# Patient Record
Sex: MALE | Race: BLACK OR AFRICAN AMERICAN | Employment: OTHER | URBAN - METROPOLITAN AREA
[De-identification: names, ages, dates, MRNs, and addresses within clinical notes are randomized per-mention and may not be internally consistent; named-entity substitution may affect disease eponyms.]

---

## 2017-04-25 ENCOUNTER — OFFICE VISIT (OUTPATIENT)
Dept: INTERNAL MEDICINE | Facility: CLINIC | Age: 57
End: 2017-04-25
Payer: MEDICAID

## 2017-04-25 VITALS
OXYGEN SATURATION: 96 % | RESPIRATION RATE: 16 BRPM | TEMPERATURE: 97.9 F | HEART RATE: 66 BPM | HEIGHT: 63 IN | DIASTOLIC BLOOD PRESSURE: 64 MMHG | WEIGHT: 155.1 LBS | SYSTOLIC BLOOD PRESSURE: 128 MMHG | BODY MASS INDEX: 27.48 KG/M2

## 2017-04-25 DIAGNOSIS — M10.00 IDIOPATHIC GOUT, UNSPECIFIED CHRONICITY, UNSPECIFIED SITE: Primary | ICD-10-CM

## 2017-04-25 PROCEDURE — 99202 OFFICE O/P NEW SF 15 MIN: CPT | Performed by: NURSE PRACTITIONER

## 2017-04-25 RX ORDER — INDOMETHACIN 25 MG/1
25 CAPSULE ORAL
Qty: 42 CAPSULE | Refills: 1 | Status: SHIPPED | OUTPATIENT
Start: 2017-04-25 | End: 2017-05-26

## 2017-04-25 RX ORDER — ATENOLOL 50 MG/1
50 TABLET ORAL DAILY
COMMUNITY
End: 2017-05-26 | Stop reason: DRUGHIGH

## 2017-04-25 NOTE — MR AVS SNAPSHOT
"              After Visit Summary   2017    Luis Monsalve    MRN: [id number]           Patient Information     Date Of Birth          1960        Visit Information        Provider Department      2017 7:20 AM Laura Granados NP [facility]        Today's Diagnoses     Idiopathic gout, unspecified chronicity, unspecified site    -  1      Care Instructions    Hold jayson Granados RUSS          Follow-ups after your visit        Who to contact     If you have questions or need follow up information about today's clinic visit or your schedule please contact [facility] directly at [phone].  Normal or non-critical lab and imaging results will be communicated to you by MyChart, letter or phone within 4 business days after the clinic has received the results. If you do not hear from us within 7 days, please contact the clinic through [employer] or phone. If you have a critical or abnormal lab result, we will notify you by phone as soon as possible.  Submit refill requests through [employer] or call your pharmacy and they will forward the refill request to us. Please allow 3 business days for your refill to be completed.          Additional Information About Your Visit        MyChart Information     [employer] lets you send messages to your doctor, view your test results, renew your prescriptions, schedule appointments and more. To sign up, go to [email or web address] . Click on \"Log in\" on the left side of the screen, which will take you to the Welcome page. Then click on \"Sign up Now\" on the right side of the page.     You will be asked to enter the access code listed below, as well as some personal information. Please follow the directions to create your username and password.     Your access code is: [email or web address]  Expires: 2017  9:27 AM     Your access code will  in 90 days. If you need help or a new code, please call your [place] clinic or " "[phone].        Care EveryWhere ID     This is your Care EveryWhere ID. This could be used by other organizations to access your [place] medical records  [id number]        Your Vitals Were     Pulse Temperature Respirations Height Pulse Oximetry BMI (Body Mass Index)    66 97.9  F (36.6  C) (Oral) 16 5' 3\" (1.6 m) 96% 27.47 kg/m2       Blood Pressure from Last 3 Encounters:   04/25/17 128/64    Weight from Last 3 Encounters:   04/25/17 155 lb 1.6 oz (70.4 kg)              Today, you had the following     No orders found for display         Today's Medication Changes          These changes are accurate as of: 4/25/17  9:27 AM.  If you have any questions, ask your nurse or doctor.               Start taking these medicines.        Dose/Directions    indomethacin 25 MG capsule   Commonly known as:  INDOCIN   Used for:  Idiopathic gout, unspecified chronicity, unspecified site   Started by:  Laura Granados, LISANDRA        Dose:  25 mg   Take 1 capsule (25 mg) by mouth 3 times daily (with meals)   Quantity:  42 capsule   Refills:  1            Where to get your medicines      These medications were sent to [id number] [address] [address]     Phone:  [phone]     indomethacin 25 MG capsule                Primary Care Provider    Physician No Ref-Primary       No address on file        Thank you!     Thank you for choosing [facility]  for your care. Our goal is always to provide you with excellent care. Hearing back from our patients is one way we can continue to improve our services. Please take a few minutes to complete the written survey that you may receive in the mail after your visit with us. Thank you!             Your Updated Medication List - Protect others around you: Learn how to safely use, store and throw away your medicines at www.disposemymeds.org.          This list is accurate as of: 4/25/17  9:27 " AM.  Always use your most recent med list.                   Brand Name Dispense Instructions for use    atenolol 50 MG tablet    TENORMIN     Take 50 mg by mouth daily       COLCRYS PO      Take 0.6 mg by mouth daily       indomethacin 25 MG capsule    INDOCIN    42 capsule    Take 1 capsule (25 mg) by mouth 3 times daily (with meals)       LISINOPRIL PO      Take 5 mg by mouth daily

## 2017-04-25 NOTE — PROGRESS NOTES
SUBJECTIVE:                                                    Luis Monsalve is a 56 year old male who presents to clinic today for the following health issues:      New Patient/Transfer of Care  bilat foot pain, swelling      Duration: 2 months    Description (location/character/radiation): bilat foot pain, swelling, great toe and MTP joints    Intensity:  moderate    Accompanying signs and symptoms: worse in PM    History (similar episodes/previous evaluation): [place] and started colcrys    Precipitating or alleviating factors: None    Therapies tried and outcome: see above         Patient Active Problem List   Diagnosis     HTN (hypertension), benign     Gout     Past Surgical History:   Procedure Laterality Date     HERNIA REPAIR  2010       Social History   Substance Use Topics     Smoking status: Never Smoker     Smokeless tobacco: Not on file     Alcohol use No     History reviewed. No pertinent family history.      Current Outpatient Prescriptions   Medication Sig Dispense Refill     atenolol (TENORMIN) 50 MG tablet Take 50 mg by mouth daily       Colchicine (COLCRYS PO) Take 0.6 mg by mouth daily       LISINOPRIL PO Take 5 mg by mouth daily       indomethacin (INDOCIN) 25 MG capsule Take 1 capsule (25 mg) by mouth 3 times daily (with meals) 42 capsule 1     BP Readings from Last 3 Encounters:   04/25/17 128/64    Wt Readings from Last 3 Encounters:   04/25/17 155 lb 1.6 oz (70.4 kg)                    Reviewed and updated as needed this visit by clinical staff  Tobacco  Allergies  Meds  Med Hx  Surg Hx  Fam Hx  Soc Hx      Reviewed and updated as needed this visit by Provider         ROS:  Constitutional, HEENT, cardiovascular, pulmonary, gi and gu systems are negative, except as otherwise noted.    OBJECTIVE:                                                    /64 (BP Location: Right arm, Patient Position: Chair, Cuff Size: Adult Regular)  Pulse 66  Temp 97.9  F  "(36.6  C) (Oral)  Resp 16  Ht 5' 3\" (1.6 m)  Wt 155 lb 1.6 oz (70.4 kg)  SpO2 96%  BMI 27.47 kg/m2  Body mass index is 27.47 kg/(m^2).  GENERAL: healthy, alert and no distress  NECK: no adenopathy, no asymmetry, masses, or scars and thyroid normal to palpation  RESP: lungs clear to auscultation - no rales, rhonchi or wheezes  CV: regular rate and rhythm, normal S1 S2, no S3 or S4, no murmur, click or rub, no peripheral edema and peripheral pulses strong  ABDOMEN: soft, nontender, no hepatosplenomegaly, no masses and bowel sounds normal  MS: bilat MTP great toe red, tender, 1+ non pitting edema bilat ankles         ASSESSMENT/PLAN:                                                              ICD-10-CM    1. Idiopathic gout, unspecified chronicity, unspecified site M10.00 indomethacin (INDOCIN) 25 MG capsule       Patient Instructions   Hold jayson Granados, LISANDRA  [facility]    "

## 2017-04-25 NOTE — NURSING NOTE
"Chief Complaint   Patient presents with     Establish Care     Musculoskeletal Problem     gout in both feet, not getting any relief from medication       Initial /64 (BP Location: Right arm, Patient Position: Chair, Cuff Size: Adult Regular)  Pulse 66  Temp 97.9  F (36.6  C) (Oral)  Resp 16  Ht 5' 3\" (1.6 m)  Wt 155 lb 1.6 oz (70.4 kg)  SpO2 96%  BMI 27.47 kg/m2 Estimated body mass index is 27.47 kg/(m^2) as calculated from the following:    Height as of this encounter: 5' 3\" (1.6 m).    Weight as of this encounter: 155 lb 1.6 oz (70.4 kg).  Medication Reconciliation: complete    "

## 2017-05-18 ENCOUNTER — OFFICE VISIT (OUTPATIENT)
Dept: INTERNAL MEDICINE | Facility: CLINIC | Age: 57
End: 2017-05-18
Payer: MEDICAID

## 2017-05-18 ENCOUNTER — RADIANT APPOINTMENT (OUTPATIENT)
Dept: GENERAL RADIOLOGY | Facility: CLINIC | Age: 57
End: 2017-05-18
Attending: NURSE PRACTITIONER
Payer: MEDICAID

## 2017-05-18 VITALS
DIASTOLIC BLOOD PRESSURE: 66 MMHG | BODY MASS INDEX: 25.39 KG/M2 | HEIGHT: 66 IN | TEMPERATURE: 97.7 F | OXYGEN SATURATION: 97 % | WEIGHT: 158 LBS | HEART RATE: 66 BPM | SYSTOLIC BLOOD PRESSURE: 124 MMHG

## 2017-05-18 DIAGNOSIS — J20.9 ACUTE BRONCHITIS WITH SYMPTOMS > 10 DAYS: ICD-10-CM

## 2017-05-18 DIAGNOSIS — Z12.5 PROSTATE CANCER SCREENING: ICD-10-CM

## 2017-05-18 DIAGNOSIS — R10.32 LEFT GROIN PAIN: ICD-10-CM

## 2017-05-18 DIAGNOSIS — Z01.89 ROUTINE LAB DRAW: ICD-10-CM

## 2017-05-18 DIAGNOSIS — K40.90 INGUINAL HERNIA, LEFT: ICD-10-CM

## 2017-05-18 DIAGNOSIS — M10.9 GOUT, UNSPECIFIED CAUSE, UNSPECIFIED CHRONICITY, UNSPECIFIED SITE: ICD-10-CM

## 2017-05-18 DIAGNOSIS — M10.9 GOUT, UNSPECIFIED CAUSE, UNSPECIFIED CHRONICITY, UNSPECIFIED SITE: Primary | ICD-10-CM

## 2017-05-18 DIAGNOSIS — J98.01 ACUTE BRONCHOSPASM: ICD-10-CM

## 2017-05-18 DIAGNOSIS — I10 ESSENTIAL HYPERTENSION: ICD-10-CM

## 2017-05-18 DIAGNOSIS — Z13.6 CARDIOVASCULAR SCREENING; LDL GOAL LESS THAN 130: ICD-10-CM

## 2017-05-18 LAB
BASOPHILS # BLD AUTO: 0 10E9/L (ref 0–0.2)
BASOPHILS NFR BLD AUTO: 0.5 %
DIFFERENTIAL METHOD BLD: ABNORMAL
EOSINOPHIL # BLD AUTO: 1.4 10E9/L (ref 0–0.7)
EOSINOPHIL NFR BLD AUTO: 18.2 %
ERYTHROCYTE [DISTWIDTH] IN BLOOD BY AUTOMATED COUNT: 12.9 % (ref 10–15)
HCT VFR BLD AUTO: 39.7 % (ref 40–53)
HGB BLD-MCNC: 12.9 G/DL (ref 13.3–17.7)
LYMPHOCYTES # BLD AUTO: 2.3 10E9/L (ref 0.8–5.3)
LYMPHOCYTES NFR BLD AUTO: 30.4 %
MCH RBC QN AUTO: 31.1 PG (ref 26.5–33)
MCHC RBC AUTO-ENTMCNC: 32.5 G/DL (ref 31.5–36.5)
MCV RBC AUTO: 96 FL (ref 78–100)
MONOCYTES # BLD AUTO: 0.7 10E9/L (ref 0–1.3)
MONOCYTES NFR BLD AUTO: 9.1 %
NEUTROPHILS # BLD AUTO: 3.1 10E9/L (ref 1.6–8.3)
NEUTROPHILS NFR BLD AUTO: 41.8 %
PLATELET # BLD AUTO: 285 10E9/L (ref 150–450)
RBC # BLD AUTO: 4.15 10E12/L (ref 4.4–5.9)
WBC # BLD AUTO: 7.5 10E9/L (ref 4–11)

## 2017-05-18 PROCEDURE — G0103 PSA SCREENING: HCPCS | Performed by: NURSE PRACTITIONER

## 2017-05-18 PROCEDURE — 73630 X-RAY EXAM OF FOOT: CPT | Mod: RT

## 2017-05-18 PROCEDURE — 36415 COLL VENOUS BLD VENIPUNCTURE: CPT | Performed by: NURSE PRACTITIONER

## 2017-05-18 PROCEDURE — 80053 COMPREHEN METABOLIC PANEL: CPT | Performed by: NURSE PRACTITIONER

## 2017-05-18 PROCEDURE — 84550 ASSAY OF BLOOD/URIC ACID: CPT | Performed by: NURSE PRACTITIONER

## 2017-05-18 PROCEDURE — 99214 OFFICE O/P EST MOD 30 MIN: CPT | Performed by: NURSE PRACTITIONER

## 2017-05-18 PROCEDURE — 84443 ASSAY THYROID STIM HORMONE: CPT | Performed by: NURSE PRACTITIONER

## 2017-05-18 PROCEDURE — 82043 UR ALBUMIN QUANTITATIVE: CPT | Performed by: NURSE PRACTITIONER

## 2017-05-18 PROCEDURE — 85025 COMPLETE CBC W/AUTO DIFF WBC: CPT | Performed by: NURSE PRACTITIONER

## 2017-05-18 PROCEDURE — 80061 LIPID PANEL: CPT | Performed by: NURSE PRACTITIONER

## 2017-05-18 RX ORDER — CEFDINIR 300 MG/1
300 CAPSULE ORAL 2 TIMES DAILY
Qty: 20 CAPSULE | Refills: 0 | Status: SHIPPED | OUTPATIENT
Start: 2017-05-18 | End: 2017-07-13

## 2017-05-18 RX ORDER — ATENOLOL 50 MG/1
50 TABLET ORAL DAILY
COMMUNITY
End: 2017-05-23

## 2017-05-18 RX ORDER — ALBUTEROL SULFATE 0.83 MG/ML
1 SOLUTION RESPIRATORY (INHALATION) ONCE
Qty: 3 ML | Refills: 0
Start: 2017-05-18 | End: 2017-05-18

## 2017-05-18 RX ORDER — ALBUTEROL SULFATE 0.83 MG/ML
1 SOLUTION RESPIRATORY (INHALATION) EVERY 6 HOURS PRN
Qty: 50 VIAL | Refills: 1 | Status: SHIPPED | OUTPATIENT
Start: 2017-05-18 | End: 2017-07-18

## 2017-05-18 RX ORDER — PREDNISONE 10 MG/1
TABLET ORAL
Qty: 18 TABLET | Refills: 0 | Status: SHIPPED | OUTPATIENT
Start: 2017-05-18 | End: 2017-05-26

## 2017-05-18 RX ORDER — ALBUTEROL SULFATE 90 UG/1
2 AEROSOL, METERED RESPIRATORY (INHALATION) EVERY 6 HOURS
COMMUNITY
End: 2017-07-18

## 2017-05-18 NOTE — MR AVS SNAPSHOT
After Visit Summary   5/18/2017    Luis Yates    MRN: [id number]           Patient Information     Date Of Birth          1960        Visit Information        Provider Department      5/18/2017 10:45 AM Carola Grullon APRN CNP; [facility]        Today's Diagnoses     Gout, unspecified cause, unspecified chronicity, unspecified site    -  1    Inguinal hernia, left        Left groin pain        Acute bronchospasm        CARDIOVASCULAR SCREENING; LDL GOAL LESS THAN 130        Essential hypertension        Routine lab draw        Acute bronchitis with symptoms > 10 days        Prostate cancer screening          Care Instructions    Labs in [address]    X ray in [address]    Foot doctor   CIPRIANO: [facility]  [facility]  [phone]   [email or web address]    Hernia doctor   [facility] [phone]   [email or web address]    Omnicef twice daily for 10 days      Prednisone- take in morning with food   Take 3 tabs daily for 3 days   Take 2 tabs daily for 3 days   Take 1 tab daily for 3 days   Then stop    Follow up with any questions or concerns          Follow-ups after your visit        Additional Services     GENERAL SURG ADULT REFERRAL       Your provider has referred you to: [facility]AN[facility]: [facility] [phone]   [email or web address]    Please be aware that coverage of these services is subject to the terms and limitations of your health insurance plan.  Call member services at your health plan with any benefit or coverage questions.      Please bring the following with you to your appointment:    (1) Any X-Rays, CTs or MRIs which have been performed.  Contact the facility where they were done to arrange for   prior to your scheduled appointment.   (2) List of current medications   (3) This referral request   (4) Any documents/labs given to you for this referral            PODIATRY/FOOT & ANKLE SURGERY REFERRAL       Your provider has referred you to: CIPRIANO: [facility] [place] [facility]  [facility]  [phone]   [email or web address]/    Please be aware that coverage of these services is subject to the terms and limitations of your health insurance plan.  Call member services at your health plan with any benefit or coverage questions.      Please bring the following to your appointment:  >>   Any x-rays, CTs or MRIs which have been performed.  Contact the facility where they were done to arrange for  prior to your scheduled appointment.    >>   List of current medications   >>   This referral request   >>   Any documents/labs given to you for this referral                  Future tests that were ordered for you today     Open Future Orders        Priority Expected Expires Ordered    XR Foot Bilateral G/E 3 Views Routine 5/18/2017 5/18/2018 5/18/2017            Who to contact     If you have questions or need follow up information about today's clinic visit or your schedule please contact [facility] directly at [phone].  Normal or non-critical lab and imaging results will be communicated to you by MyChart, letter or phone within 4 business days after the clinic has received the results. If you do not hear from us within 7 days, please contact the clinic through MyChart or phone. If you have a critical or abnormal lab result, we will notify you by phone as soon as possible.  Submit refill requests through [employer] or call your pharmacy and they will forward the refill request to us. Please allow 3 business days for your refill to be completed.          Additional Information About Your Visit       "  [email or web address] Information     [employer] lets you send messages to your doctor, view your test results, renew your prescriptions, schedule appointments and more. To sign up, go to [email or web address] . Click on \"Log in\" on the left side of the screen, which will take you to the Welcome page. Then click on \"Sign up Now\" on the right side of the page.     You will be asked to enter the access code listed below, as well as some personal information. Please follow the directions to create your username and password.     Your access code is: [email or web address]  Expires: 2017 12:39 PM     Your access code will  in 90 days. If you need help or a new code, please call your [place] clinic or [phone].        Care EveryWhere ID     This is your Care EveryWhere ID. This could be used by other organizations to access your [place] medical records  [id number]        Your Vitals Were     Pulse Temperature Height Pulse Oximetry BMI (Body Mass Index)       66 97.7  F (36.5  C) (Oral) 5' 6\" (1.676 m) 97% 25.5 kg/m2        Blood Pressure from Last 3 Encounters:   17 124/66    Weight from Last 3 Encounters:   17 158 lb (71.7 kg)              We Performed the Following     Albumin Random Urine Quantitative     CBC with platelets differential     Comprehensive metabolic panel     GENERAL SURG ADULT REFERRAL     Lipid Profile with reflex to direct LDL     PODIATRY/FOOT & ANKLE SURGERY REFERRAL     PSA, screen     TSH with free T4 reflex     Uric acid          Today's Medication Changes          These changes are accurate as of: 17 12:39 PM.  If you have any questions, ask your nurse or doctor.               Start taking these medicines.        Dose/Directions    cefdinir 300 MG capsule   Commonly known as:  OMNICEF   Used for:  Acute bronchospasm, Acute bronchitis with symptoms > 10 days   Started by:  Caroal Grullon APRN CNP        Dose:  300 mg   Take 1 capsule (300 mg) by mouth 2 times daily   Quantity: "  20 capsule   Refills:  0       order for DME   Used for:  Acute bronchospasm, Acute bronchitis with symptoms > 10 days   Started by:  Carola Grullon APRN CNP        Neb machine and tubing   Quantity:  1 Device   Refills:  prn       predniSONE 10 MG tablet   Commonly known as:  DELTASONE   Used for:  Acute bronchospasm, Acute bronchitis with symptoms > 10 days   Started by:  Carola Grullon APRN CNP        Take 3 tabs daily for 3 days Take 2 tabs daily for 3 days Take 1 tab daily for 3 days Then stop   Quantity:  18 tablet   Refills:  0         These medicines have changed or have updated prescriptions.        Dose/Directions    * albuterol 108 (90 BASE) MCG/ACT Inhaler   Commonly known as:  PROAIR HFA/PROVENTIL HFA/VENTOLIN HFA   This may have changed:  Another medication with the same name was added. Make sure you understand how and when to take each.   Changed by:  Carola Grullon APRN CNP        Dose:  2 puff   Inhale 2 puffs into the lungs every 6 hours   Refills:  0       * albuterol (2.5 MG/3ML) 0.083% neb solution   This may have changed:  You were already taking a medication with the same name, and this prescription was added. Make sure you understand how and when to take each.   Used for:  Acute bronchospasm   Changed by:  Carola Grullon APRN CNP        Dose:  1 vial   Take 1 vial (2.5 mg) by nebulization once for 1 dose   Quantity:  3 mL   Refills:  0       * albuterol (2.5 MG/3ML) 0.083% neb solution   This may have changed:  You were already taking a medication with the same name, and this prescription was added. Make sure you understand how and when to take each.   Used for:  Acute bronchospasm, Acute bronchitis with symptoms > 10 days   Changed by:  Carola Grullon APRN CNP        Dose:  1 vial   Take 1 vial (2.5 mg) by nebulization every 6 hours as needed for shortness of breath / dyspnea or wheezing   Quantity:  50 vial   Refills:  1       * Notice:  This list has 3  medication(s) that are the same as other medications prescribed for you. Read the directions carefully, and ask your doctor or other care provider to review them with you.         Where to get your medicines      These medications were sent to [id number] - [place], [place] - [address]  [address], [facility] [address]     Phone:  [phone]     albuterol (2.5 MG/3ML) 0.083% neb solution    cefdinir 300 MG capsule    predniSONE 10 MG tablet         Some of these will need a paper prescription and others can be bought over the counter.  Ask your nurse if you have questions.     Bring a paper prescription for each of these medications     order for DME       You don't need a prescription for these medications     albuterol (2.5 MG/3ML) 0.083% neb solution                Primary Care Provider    None Specified       No primary provider on file.        Thank you!     Thank you for choosing [facility]  for your care. Our goal is always to provide you with excellent care. Hearing back from our patients is one way we can continue to improve our services. Please take a few minutes to complete the written survey that you may receive in the mail after your visit with us. Thank you!             Your Updated Medication List - Protect others around you: Learn how to safely use, store and throw away your medicines at www.disposemymeds.org.          This list is accurate as of: 5/18/17 12:39 PM.  Always use your most recent med list.                   Brand Name Dispense Instructions for use    * albuterol 108 (90 BASE) MCG/ACT Inhaler    PROAIR HFA/PROVENTIL HFA/VENTOLIN HFA     Inhale 2 puffs into the lungs every 6 hours       * albuterol (2.5 MG/3ML) 0.083% neb solution     3 mL    Take 1 vial (2.5 mg) by nebulization once for 1 dose       * albuterol (2.5 MG/3ML) 0.083% neb solution     50 vial    Take 1 vial (2.5 mg) by nebulization every 6 hours as needed for shortness of breath  / dyspnea or wheezing       atenolol 50 MG tablet    TENORMIN     Take 50 mg by mouth daily       cefdinir 300 MG capsule    OMNICEF    20 capsule    Take 1 capsule (300 mg) by mouth 2 times daily       COLCHICINE PO      Take 0.6 mg by mouth daily       INDOMETHACIN PO      Take 25 mg by mouth 3 times daily       LOSARTAN POTASSIUM PO      Take 25 mg by mouth daily       order for DME     1 Device    Neb machine and tubing       predniSONE 10 MG tablet    DELTASONE    18 tablet    Take 3 tabs daily for 3 days Take 2 tabs daily for 3 days Take 1 tab daily for 3 days Then stop       * Notice:  This list has 3 medication(s) that are the same as other medications prescribed for you. Read the directions carefully, and ask your doctor or other care provider to review them with you.

## 2017-05-18 NOTE — NURSING NOTE
"Chief Complaint   Patient presents with     Breathing Problem     onset x 2 weeks       Initial /66 (BP Location: Left arm, Patient Position: Chair, Cuff Size: Adult Large)  Pulse 66  Temp 97.7  F (36.5  C) (Oral)  Ht 5' 6\" (1.676 m)  Wt 158 lb (71.7 kg)  SpO2 97%  BMI 25.5 kg/m2 Estimated body mass index is 25.5 kg/(m^2) as calculated from the following:    Height as of this encounter: 5' 6\" (1.676 m).    Weight as of this encounter: 158 lb (71.7 kg).  Medication Reconciliation: complete    "

## 2017-05-18 NOTE — PROGRESS NOTES
".  SUBJECTIVE:                                                    Luis Yates is a 56 year old male who presents to clinic today for the following health issues:    Chief Complaint   Patient presents with     Breathing Problem     onset x 2 weeks  Noisy at night   Sneeze a lot    Was given albuterol for wheezing about 2 weeks ago   No history asthma   Was given antibiotics also - sounds like Z pack   Chest x ray was ok     Has some pain in left lower abdomen   Burning with urination   Has history of hernia in past that was repaired       Gout - indocin   Blood pressure went high and he had a fever so stopped after 1 dose  Was taking colchicine daily for more than 2 months to keep controlled  He has been off medication for a week     Both shoulder bother him has been present about a month   They feel itchy   Hard to sleep   Pinching feeling in shoulder     wants fasting labs     [place] original home- here with daughter       Problem list and histories reviewed & adjusted, as indicated.  Additional history: as documented    Patient Active Problem List   Diagnosis     HTN (hypertension)     Gout     Past Surgical History:   Procedure Laterality Date     HERNIA REPAIR  2010    left        Social History   Substance Use Topics     Smoking status: Never Smoker     Smokeless tobacco: Not on file     Alcohol use No     No family history on file.        Reviewed and updated as needed this visit by clinical staff  Tobacco  Allergies  Meds  Soc Hx      Reviewed and updated as needed this visit by Provider         ROS:  Constitutional, HEENT, cardiovascular, pulmonary, GI, , musculoskeletal, neuro, skin, endocrine and psych systems are negative, except as otherwise noted.    OBJECTIVE:                                                    /66 (BP Location: Left arm, Patient Position: Chair, Cuff Size: Adult Large)  Pulse 66  Temp 97.7  F (36.5  C) (Oral)  Ht 5' 6\" (1.676 m)  Wt 158 lb (71.7 kg)  SpO2 97%  BMI " 25.5 kg/m2  Body mass index is 25.5 kg/(m^2).  GENERAL: alert and mild distress with foot pain and wheezing ; daughter and  present   RESP: lungs with scattered wheezing throughout   Albuterol neb given with improvement and only occasional wheezes   CV: regular rate and rhythm, normal S1 S2, no S3 or S4, no murmur, click or rub, no peripheral edema   ABDOMEN: soft, LLQ and bulging with hernia , no hepatosplenomegaly, no masses and bowel sounds normal   (male): normal male genitalia without lesions or urethral discharge, left hernia  MS: no gross musculoskeletal defects noted, no edema- painful beside first great toe bilaterally- no excess heat or redness   NEURO: Normal strength and tone, mentation intact and speech normal  PSYCH: mentation appears normal, affect normal/bright    Diagnostic Test Results:  Labs   X ray        ASSESSMENT/PLAN:                                                            1. Gout, unspecified cause, unspecified chronicity, unspecified site  Not sure if this is gout with ongoing symptoms - will do labs, x ray and refer to podiatry    - CBC with platelets differential  - Uric acid  - PODIATRY/FOOT & ANKLE SURGERY REFERRAL  - Comprehensive metabolic panel  - XR Foot Bilateral G/E 3 Views; Future    2. Inguinal hernia, left  Bulging and pain   - GENERAL SURG ADULT REFERRAL    3. Left groin pain  As above     4. Acute bronchospasm  Improved with neb; had done inhaler earlier and not as improved; needs neb   - albuterol (2.5 MG/3ML) 0.083% neb solution; Take 1 vial (2.5 mg) by nebulization once for 1 dose  Dispense: 3 mL; Refill: 0  - albuterol (2.5 MG/3ML) 0.083% neb solution; Take 1 vial (2.5 mg) by nebulization every 6 hours as needed for shortness of breath / dyspnea or wheezing  Dispense: 50 vial; Refill: 1  - order for DME; Neb machine and tubing  Dispense: 1 Device; Refill: prn  - cefdinir (OMNICEF) 300 MG capsule; Take 1 capsule (300 mg) by mouth 2 times daily  Dispense: 20  capsule; Refill: 0  - predniSONE (DELTASONE) 10 MG tablet; Take 3 tabs daily for 3 days Take 2 tabs daily for 3 days Take 1 tab daily for 3 days Then stop  Dispense: 18 tablet; Refill: 0    5. CARDIOVASCULAR SCREENING; LDL GOAL LESS THAN 130    - Lipid Profile with reflex to direct LDL    6. Essential hypertension  Good range   - Lipid Profile with reflex to direct LDL  - TSH with free T4 reflex  - Albumin Random Urine Quantitative    7. Routine lab draw    - Lipid Profile with reflex to direct LDL  - TSH with free T4 reflex  - Albumin Random Urine Quantitative    8. Acute bronchitis with symptoms > 10 days    - albuterol (2.5 MG/3ML) 0.083% neb solution; Take 1 vial (2.5 mg) by nebulization every 6 hours as needed for shortness of breath / dyspnea or wheezing  Dispense: 50 vial; Refill: 1  - order for DME; Neb machine and tubing  Dispense: 1 Device; Refill: prn  - cefdinir (OMNICEF) 300 MG capsule; Take 1 capsule (300 mg) by mouth 2 times daily  Dispense: 20 capsule; Refill: 0  - predniSONE (DELTASONE) 10 MG tablet; Take 3 tabs daily for 3 days Take 2 tabs daily for 3 days Take 1 tab daily for 3 days Then stop  Dispense: 18 tablet; Refill: 0    9. Prostate cancer screening    - PSA, screen    Patient Instructions   Labs in [address]    X ray in [address]    Foot doctor   LATRELLG: [facility]  [facility]  [phone]   [email or web address]/    Hernia doctor   CIPRIANO: [facility] [place] [phone]   [email or web address]    Omnicef twice daily for 10 days      Prednisone- take in morning with food   Take 3 tabs daily for 3 days   Take 2 tabs daily for 3 days   Take 1 tab daily for 3 days   Then stop    Follow up with any questions or concerns      Appointment was longer than 90 minutes and greater than 50%  related to multiple problems listed above  and need for - family kept asking for more things to be done and assessed     PAYAL Baker [facility]

## 2017-05-18 NOTE — PATIENT INSTRUCTIONS
Labs in [address]    X ray [address]    Foot doctor   LATRELLG: [facility] [place] [facility]  [facility]  [phone]   [email or web address]/    Hernia doctor   CIPRIANO: [facility] [place] [phone]   [email or web address]    Omnicef twice daily for 10 days      Prednisone- take in morning with food   Take 3 tabs daily for 3 days   Take 2 tabs daily for 3 days   Take 1 tab daily for 3 days   Then stop    Follow up with any questions or concerns

## 2017-05-19 LAB
ALBUMIN SERPL-MCNC: 3.7 G/DL (ref 3.4–5)
ALP SERPL-CCNC: 54 U/L (ref 40–150)
ALT SERPL W P-5'-P-CCNC: 15 U/L (ref 0–70)
ANION GAP SERPL CALCULATED.3IONS-SCNC: 12 MMOL/L (ref 3–14)
AST SERPL W P-5'-P-CCNC: 11 U/L (ref 0–45)
BILIRUB SERPL-MCNC: 0.3 MG/DL (ref 0.2–1.3)
BUN SERPL-MCNC: 7 MG/DL (ref 7–30)
CALCIUM SERPL-MCNC: 9.1 MG/DL (ref 8.5–10.1)
CHLORIDE SERPL-SCNC: 107 MMOL/L (ref 94–109)
CHOLEST SERPL-MCNC: 234 MG/DL
CO2 SERPL-SCNC: 25 MMOL/L (ref 20–32)
CREAT SERPL-MCNC: 0.69 MG/DL (ref 0.66–1.25)
CREAT UR-MCNC: 210 MG/DL
GFR SERPL CREATININE-BSD FRML MDRD: ABNORMAL ML/MIN/1.7M2
GLUCOSE SERPL-MCNC: 106 MG/DL (ref 70–99)
HDLC SERPL-MCNC: 43 MG/DL
LDLC SERPL CALC-MCNC: 156 MG/DL
MICROALBUMIN UR-MCNC: 8 MG/L
MICROALBUMIN/CREAT UR: 4.01 MG/G CR (ref 0–17)
NONHDLC SERPL-MCNC: 191 MG/DL
POTASSIUM SERPL-SCNC: 4.7 MMOL/L (ref 3.4–5.3)
PROT SERPL-MCNC: 7.4 G/DL (ref 6.8–8.8)
PSA SERPL-ACNC: 1.9 UG/L (ref 0–4)
SODIUM SERPL-SCNC: 144 MMOL/L (ref 133–144)
TRIGL SERPL-MCNC: 173 MG/DL
TSH SERPL DL<=0.005 MIU/L-ACNC: 2.08 MU/L (ref 0.4–4)
URATE SERPL-MCNC: 7.1 MG/DL (ref 3.5–7.2)

## 2017-05-23 ENCOUNTER — OFFICE VISIT (OUTPATIENT)
Dept: INTERNAL MEDICINE | Facility: CLINIC | Age: 57
End: 2017-05-23
Payer: MEDICAID

## 2017-05-23 ENCOUNTER — OFFICE VISIT (OUTPATIENT)
Dept: PODIATRY | Facility: CLINIC | Age: 57
End: 2017-05-23
Payer: MEDICAID

## 2017-05-23 VITALS
HEIGHT: 66 IN | HEART RATE: 91 BPM | WEIGHT: 158 LBS | DIASTOLIC BLOOD PRESSURE: 77 MMHG | SYSTOLIC BLOOD PRESSURE: 172 MMHG | RESPIRATION RATE: 12 BRPM | TEMPERATURE: 98.1 F | BODY MASS INDEX: 25.39 KG/M2 | OXYGEN SATURATION: 97 %

## 2017-05-23 VITALS
DIASTOLIC BLOOD PRESSURE: 70 MMHG | SYSTOLIC BLOOD PRESSURE: 122 MMHG | HEIGHT: 63 IN | WEIGHT: 155.1 LBS | BODY MASS INDEX: 27.48 KG/M2

## 2017-05-23 DIAGNOSIS — J20.9 ACUTE BRONCHITIS WITH SYMPTOMS > 10 DAYS: ICD-10-CM

## 2017-05-23 DIAGNOSIS — Z00.01 ENCOUNTER FOR GENERAL ADULT MEDICAL EXAMINATION WITH ABNORMAL FINDINGS: Primary | ICD-10-CM

## 2017-05-23 DIAGNOSIS — M79.673 ARCH PAIN, UNSPECIFIED LATERALITY: ICD-10-CM

## 2017-05-23 DIAGNOSIS — I10 ESSENTIAL HYPERTENSION: ICD-10-CM

## 2017-05-23 DIAGNOSIS — E78.00 HYPERCHOLESTEREMIA: ICD-10-CM

## 2017-05-23 DIAGNOSIS — M10.9 GOUT, UNSPECIFIED CAUSE, UNSPECIFIED CHRONICITY, UNSPECIFIED SITE: ICD-10-CM

## 2017-05-23 DIAGNOSIS — R60.0 BILATERAL LOWER EXTREMITY EDEMA: ICD-10-CM

## 2017-05-23 DIAGNOSIS — M20.5X9 HALLUX LIMITUS, UNSPECIFIED LATERALITY: Primary | ICD-10-CM

## 2017-05-23 PROCEDURE — 99243 OFF/OP CNSLTJ NEW/EST LOW 30: CPT | Performed by: PODIATRIST

## 2017-05-23 PROCEDURE — T1013 SIGN LANG/ORAL INTERPRETER: HCPCS | Mod: U3 | Performed by: NURSE PRACTITIONER

## 2017-05-23 PROCEDURE — T1013 SIGN LANG/ORAL INTERPRETER: HCPCS | Mod: U3 | Performed by: PODIATRIST

## 2017-05-23 PROCEDURE — 99396 PREV VISIT EST AGE 40-64: CPT | Performed by: NURSE PRACTITIONER

## 2017-05-23 RX ORDER — ATORVASTATIN CALCIUM 20 MG/1
20 TABLET, FILM COATED ORAL DAILY
Qty: 90 TABLET | Refills: 1 | Status: SHIPPED | OUTPATIENT
Start: 2017-05-23 | End: 2017-05-26

## 2017-05-23 RX ORDER — ATENOLOL 50 MG/1
100 TABLET ORAL DAILY
Qty: 180 TABLET | Refills: 1 | Status: SHIPPED | OUTPATIENT
Start: 2017-05-23 | End: 2017-08-10

## 2017-05-23 NOTE — PATIENT INSTRUCTIONS
Losartan once daily for blood pressure     Atenolol  100 mg once daily for blood pressure     Follow up blood pressure in a month     Finish prednisone and omnicef    Lipitor once daily for cholesterol        Preventive Health Recommendations  Male Ages 50 - 64    Yearly exam:             See your health care provider every year in order to  o   Review health changes.   o   Discuss preventive care.    o   Review your medicines if your doctor has prescribed any.     Have a cholesterol test every 5 years, or more frequently if you are at risk for high cholesterol/heart disease.     Have a diabetes test (fasting glucose) every three years. If you are at risk for diabetes, you should have this test more often.     Have a colonoscopy at age 50, or have a yearly FIT test (stool test). These exams will check for colon cancer.      Talk with your health care provider about whether or not a prostate cancer screening test (PSA) is right for you.    You should be tested each year for STDs (sexually transmitted diseases), if you re at risk.     Shots: Get a flu shot each year. Get a tetanus shot every 10 years.     Nutrition:    Eat at least 5 servings of fruits and vegetables daily.     Eat whole-grain bread, whole-wheat pasta and brown rice instead of white grains and rice.     Talk to your provider about Calcium and Vitamin D.     Lifestyle    Exercise for at least 150 minutes a week (30 minutes a day, 5 days a week). This will help you control your weight and prevent disease.     Limit alcohol to one drink per day.     No smoking.     Wear sunscreen to prevent skin cancer.     See your dentist every six months for an exam and cleaning.     See your eye doctor every 1 to 2 years.

## 2017-05-23 NOTE — Clinical Note
Good morning  I saw Luis on Tuesday for bilateral arch pain, 1st MPJ pain and swelling.  He was given compression stockings and elevation instructions, we reviewed RICE/NSAID, he was given insert information, and I ordered bilateral 1st MPJ fluoro-guided steroid injections.  He'll follow up prn.  Thanks  Tank

## 2017-05-23 NOTE — MR AVS SNAPSHOT
After Visit Summary   5/23/2017    Luis Yates    MRN: [id number]           Patient Information     Date Of Birth          1960        Visit Information        Provider Department      5/23/2017 2:45 PM Carola Grullon APRN CNP; [facility]        Today's Diagnoses     Hypercholesteremia    -  1    Essential hypertension        Gout, unspecified cause, unspecified chronicity, unspecified site          Care Instructions    Losartan once daily for blood pressure     Atenolol  100 mg once daily for blood pressure     Follow up blood pressure in a month     Finish prednisone and omnicef    Lipitor once daily for cholesterol        Preventive Health Recommendations  Male Ages 50 - 64    Yearly exam:             See your health care provider every year in order to  o   Review health changes.   o   Discuss preventive care.    o   Review your medicines if your doctor has prescribed any.     Have a cholesterol test every 5 years, or more frequently if you are at risk for high cholesterol/heart disease.     Have a diabetes test (fasting glucose) every three years. If you are at risk for diabetes, you should have this test more often.     Have a colonoscopy at age 50, or have a yearly FIT test (stool test). These exams will check for colon cancer.      Talk with your health care provider about whether or not a prostate cancer screening test (PSA) is right for you.    You should be tested each year for STDs (sexually transmitted diseases), if you re at risk.     Shots: Get a flu shot each year. Get a tetanus shot every 10 years.     Nutrition:    Eat at least 5 servings of fruits and vegetables daily.     Eat whole-grain bread, whole-wheat pasta and brown rice instead of white grains and rice.     Talk to your provider about Calcium and Vitamin D.     Lifestyle    Exercise for at least 150 minutes a week (30 minutes a day, 5 days a week). This will help you control  "your weight and prevent disease.     Limit alcohol to one drink per day.     No smoking.     Wear sunscreen to prevent skin cancer.     See your dentist every six months for an exam and cleaning.     See your eye doctor every 1 to 2 years.          Follow-ups after your visit        Your next 10 appointments already scheduled     May 25, 2017 10:15 AM CDT   CONSULT with Cinthia Kyle MD   [facility]    [address] [facility]  [facility] [id number] [phone]              Who to contact     If you have questions or need follow up information about today's clinic visit or your schedule please contact [facility] directly at [phone].  Normal or non-critical lab and imaging results will be communicated to you by MyChart, letter or phone within 4 business days after the clinic has received the results. If you do not hear from us within 7 days, please contact the clinic through MyChart or phone. If you have a critical or abnormal lab result, we will notify you by phone as soon as possible.  Submit refill requests through [employer] or call your pharmacy and they will forward the refill request to us. Please allow 3 business days for your refill to be completed.          Additional Information About Your Visit        [employer] Information     [employer] lets you send messages to your doctor, view your test results, renew your prescriptions, schedule appointments and more. To sign up, go to [email or web address] . Click on \"Log in\" on the left side of the screen, which will take you to the Welcome page. Then click on \"Sign up Now\" on the right side of the page.     You will be asked to enter the access code listed below, as well as some personal information. Please follow the directions to create your username and password.     Your access code is: [email or web address]  Expires: 2017 12:39 PM     Your access code will  in 90 " "days. If you need help or a new code, please call your [facility] or [phone].        Care EveryWhere ID     This is your Care EveryWhere ID. This could be used by other organizations to access your [place] medical records  [id number]        Your Vitals Were     Pulse Temperature Respirations Height Pulse Oximetry BMI (Body Mass Index)    91 98.1  F (36.7  C) (Oral) 12 5' 6\" (1.676 m) 97% 25.5 kg/m2       Blood Pressure from Last 3 Encounters:   05/23/17 172/77   05/18/17 124/66    Weight from Last 3 Encounters:   05/23/17 158 lb (71.7 kg)   05/18/17 158 lb (71.7 kg)              Today, you had the following     No orders found for display         Today's Medication Changes          These changes are accurate as of: 5/23/17  4:05 PM.  If you have any questions, ask your nurse or doctor.               Start taking these medicines.        Dose/Directions    atorvastatin 20 MG tablet   Commonly known as:  LIPITOR   Used for:  Hypercholesteremia        Dose:  20 mg   Take 1 tablet (20 mg) by mouth daily   Quantity:  90 tablet   Refills:  1         These medicines have changed or have updated prescriptions.        Dose/Directions    atenolol 50 MG tablet   Commonly known as:  TENORMIN   This may have changed:  how much to take   Used for:  Essential hypertension        Dose:  100 mg   Take 2 tablets (100 mg) by mouth daily   Quantity:  180 tablet   Refills:  1            Where to get your medicines      These medications were sent to [id number] [address] [place] [address] [address] [address]     Phone:  [phone]     atenolol 50 MG tablet    atorvastatin 20 MG tablet                Primary Care Provider Office Phone # Fax #    PAYAL Baker [phone]       [facility] [address] [address]        Thank you!     Thank you for choosing [facility]  for your care. Our goal is always to " provide you with excellent care. Hearing back from our patients is one way we can continue to improve our services. Please take a few minutes to complete the written survey that you may receive in the mail after your visit with us. Thank you!             Your Updated Medication List - Protect others around you: Learn how to safely use, store and throw away your medicines at www.disposemymeds.org.          This list is accurate as of: 5/23/17  4:05 PM.  Always use your most recent med list.                   Brand Name Dispense Instructions for use    * albuterol 108 (90 BASE) MCG/ACT Inhaler    PROAIR HFA/PROVENTIL HFA/VENTOLIN HFA     Inhale 2 puffs into the lungs every 6 hours       * albuterol (2.5 MG/3ML) 0.083% neb solution     3 mL    Take 1 vial (2.5 mg) by nebulization once for 1 dose       * albuterol (2.5 MG/3ML) 0.083% neb solution     50 vial    Take 1 vial (2.5 mg) by nebulization every 6 hours as needed for shortness of breath / dyspnea or wheezing       atenolol 50 MG tablet    TENORMIN    180 tablet    Take 2 tablets (100 mg) by mouth daily       atorvastatin 20 MG tablet    LIPITOR    90 tablet    Take 1 tablet (20 mg) by mouth daily       cefdinir 300 MG capsule    OMNICEF    20 capsule    Take 1 capsule (300 mg) by mouth 2 times daily       COLCHICINE PO      Take 0.6 mg by mouth daily       INDOMETHACIN PO      Take 25 mg by mouth 3 times daily       LOSARTAN POTASSIUM PO      Take 25 mg by mouth daily       order for DME     1 Device    Neb machine and tubing       predniSONE 10 MG tablet    DELTASONE    18 tablet    Take 3 tabs daily for 3 days Take 2 tabs daily for 3 days Take 1 tab daily for 3 days Then stop       * Notice:  This list has 3 medication(s) that are the same as other medications prescribed for you. Read the directions carefully, and ask your doctor or other care provider to review them with you.

## 2017-05-23 NOTE — NURSING NOTE
"Chief Complaint   Patient presents with     Foot Pain     Gout in both feet       Initial Ht 5' 3\" (1.6 m)  Wt 155 lb 1.6 oz (70.4 kg)  BMI 27.47 kg/m2 Estimated body mass index is 27.47 kg/(m^2) as calculated from the following:    Height as of this encounter: 5' 3\" (1.6 m).    Weight as of this encounter: 155 lb 1.6 oz (70.4 kg).  Medication Reconciliation: incomplete    "

## 2017-05-23 NOTE — NURSING NOTE
"Chief Complaint   Patient presents with     Physical       Initial /77 (BP Location: Left arm, Patient Position: Chair, Cuff Size: Adult Large)  Pulse 91  Temp 98.1  F (36.7  C) (Oral)  Resp 12  Ht 5' 6\" (1.676 m)  Wt 158 lb (71.7 kg)  SpO2 97%  BMI 25.5 kg/m2 Estimated body mass index is 25.5 kg/(m^2) as calculated from the following:    Height as of this encounter: 5' 6\" (1.676 m).    Weight as of this encounter: 158 lb (71.7 kg).  Medication Reconciliation: complete     ANIKA Agudelo      "

## 2017-05-23 NOTE — PROGRESS NOTES
SUBJECTIVE:     CC: Luis Yates is an 56 year old male who presents for preventative health visit.     Healthy Habits:    Do you get at least three servings of calcium containing foods daily (dairy, green leafy vegetables, etc.)? yes    Amount of exercise or daily activities, outside of work: None    Problems taking medications regularly Has not been taking BP meds as prescribed    Medication side effects: No    Have you had an eye exam in the past two years? no    Do you see a dentist twice per year? no    Do you have sleep apnea, excessive snoring or daytime drowsiness?no            Today's PHQ-2 Score:   PHQ-2 ( 1999 Pfizer) 5/23/2017   Q1: Little interest or pleasure in doing things 0   Q2: Feeling down, depressed or hopeless 0   PHQ-2 Score 0       Abuse: Current or Past(Physical, Sexual or Emotional)- No  Do you feel safe in your environment - Yes    Social History   Substance Use Topics     Smoking status: Never Smoker     Smokeless tobacco: Not on file     Alcohol use No     The patient does not drink >3 drinks per day nor >7 drinks per week.    Last PSA:   PSA   Date Value Ref Range Status   05/18/2017 1.90 0 - 4 ug/L Final     Comment:     Assay Method:  Chemiluminescence using Siemens Vista analyzer       Recent Labs   Lab Test  05/18/17   1244   CHOL  234*   HDL  43   LDL  156*   TRIG  173*   NHDL  191*       Reviewed orders with patient. Reviewed health maintenance and updated orders accordingly - Yes    Reviewed and updated as needed this visit by clinical staff  Allergies  Meds         Reviewed and updated as needed this visit by Provider        HPI  Saw a week ago for respiratory issues and gout  Foot pain improved and absent; seeing podiatry   Respiratory  improved   Not taking antibiotics as directed   Has been taking prednisone       Took only atenolol 100 mg daily for blood pressure    Reviewed medication ; on losartan as well   Will restart this medication and follow up for blood pressure  "    Hernia found last visit - will see surgery         ROS:  C: NEGATIVE for fever, chills, change in weight  I: NEGATIVE for worrisome rashes, moles or lesions  E: NEGATIVE for vision changes or irritation  ENT: NEGATIVE for ear, mouth and throat problems  R: NEGATIVE for significant cough or SOB  CV: NEGATIVE for chest pain, palpitations or peripheral edema  GI: NEGATIVE for nausea, abdominal pain, heartburn, or change in bowel habits   male: negative for dysuria, hematuria, decreased urinary stream, erectile dysfunction, urethral discharge  Hernia   M: back pain better with breathing better   Gout improved with prednisone   N: NEGATIVE for weakness, dizziness or paresthesias  P: NEGATIVE for changes in mood or affect    Problem list, Medication list, Allergies, and Medical/Social/Surgical histories reviewed in EPIC and updated as appropriate.  OBJECTIVE:     /77 (BP Location: Left arm, Patient Position: Chair, Cuff Size: Adult Large)  Pulse 91  Temp 98.1  F (36.7  C) (Oral)  Resp 12  Ht 5' 6\" (1.676 m)  Wt 158 lb (71.7 kg)  SpO2 97%  BMI 25.5 kg/m2  EXAM:  GENERAL: alert and no distress; here with son and    EYES: Eyes grossly normal to inspection, and conjunctivae and sclerae normal  NECK: no adenopathy, no asymmetry, masses, or scars and thyroid normal to palpation  RESP: lungs with occasional wheezes noted   CV: regular rate and rhythm, no peripheral edema a  ABDOMEN: soft, nontender, no hepatosplenomegaly, no masses and bowel sounds normal  Left inguinal hernia   MS: no gross musculoskeletal defects noted, no edema  SKIN: no suspicious lesions or rashes  NEURO: Normal strength and tone, mentation intact and speech normal  PSYCH: mentation appears normal, affect normal/bright    ASSESSMENT/PLAN:     (Z00.01) Encounter for general adult medical examination with abnormal findings  (primary encounter diagnosis)  Comment: feeling better than previous visit   Plan: reviewed labs     (I10) " "Essential hypertension  Comment: needs better control - not taking losartan   Plan: atenolol (TENORMIN) 50 MG tablet        Restart losartan    (E78.00) Hypercholesteremia  Comment: needs cholesterol lowering medication   Plan: atorvastatin (LIPITOR) 20 MG tablet            (M10.9) Gout, unspecified cause, unspecified chronicity, unspecified site  Comment: resolved   Plan: podiatry     (J20.9) Acute bronchitis with symptoms > 10 days  Comment: improved still have some wheezing   Plan: complete antibiotic and prednisone       COUNSELING:  Reviewed preventive health counseling, as reflected in patient instructions       Regular exercise       Healthy diet/nutrition       Prostate cancer screening       Osteoporosis Prevention/Bone Health         reports that he has never smoked. He does not have any smokeless tobacco history on file.    Estimated body mass index is 25.5 kg/(m^2) as calculated from the following:    Height as of this encounter: 5' 6\" (1.676 m).    Weight as of this encounter: 158 lb (71.7 kg).       Counseling Resources:  ATP IV Guidelines  Pooled Cohorts Equation Calculator  FRAX Risk Assessment  ICSI Preventive Guidelines  Dietary Guidelines for Americans, 2010  USDA's MyPlate  ASA Prophylaxis  Lung CA Screening    PAYAL Baker [facility]  "

## 2017-05-23 NOTE — PATIENT INSTRUCTIONS
DR. BOOTHE'S CLINIC LOCATIONS:       MONDAY - [place]  TUESDAY - [place]   [address] [facility] [address] [facility]   [place] [address] [place] [address]   [phone]       WEDNESDAY - SURGERY THURSDAY AM - JOSE MARIA   [phone] [address]    [place] [address] [phone]       THURSDAY PM - [place] FRIDAY AM - [place]   [address] [facility] [id number] [place] [address] [place] [address]   [phone]       APPT SCHEDULING: SEND FAXES TO:   [phone]     Follow Up:     PRICE THERAPY    Many aches and pains throughout the foot and ankle can be helped with many simple treatments. This is usually described as PRICE Therapy.      P - Protection - often times, inflammation/pain in the lower extremity is not able to improve simply because the areas involved are never allowed to rest. Every step we take can bother the problematic area. Protecting those areas is an important step in the healing process. This may involve a walking cast boot, a special insert/orthotic device, an ankle brace, or simply avoiding barefoot walking.    R - Rest - in addition to protecting the foot/ankle, resting is an important, but often times difficult, treatment option. Getting off your feet when they bother you, and specifically avoiding activities that cause pain/discomfort, are very beneficial to prevent, and treat, foot/ankle pain.      I - Ice - icing regularly can help to decrease inflammation and swelling in the foot, thus decreasing pain. Using an ice pack or a bag of frozen veggies works very well. Ice for 20 minutes multiple times per day as needed.  Do not place the ice directly on the skin as this can cause tissue damage.    C - Compression - using a compression wrap or an ACE wrap can help to decrease swelling, which can help to decrease pain. Wearing the wraps is generally not needed at night, but they should be worn on a regular  basis when you are going to be on your feet for prolonged periods as gravity tends to pull fluids down to your feet/ankles.    E - Elevation - elevating your lower extremities multiple times daily for 15-20 minutes can help to decrease swelling, which works well in decreasing pain levels.    NSAID/Tylenol - Anti-inflammatories like Aleve or ibuprofen, and/or a pain medication, such as Tylenol, can help to improve pain levels and get the issue resolved sooner rather than later. Anyone with liver issues should be careful with Tylenol, and anyone with high blood pressure or heart, stomach or kidney issues should be careful with anti-inflammatories. Please ask if you have questions about these medications, including dosage.        Over the Counter Inserts    Super Feet are the most common and easiest to find.    Locations include any [employer], Flo[employer] in [place] on [address]  and in [place] on [address], [employer]Rx in [facility] on [address], [facility] Running Room in [facility] on [address] [facility] Running Room in [place] on [address], [employer] in [place] on [address] and [employer] in [facility] on [address] and in [place] on [address].    Sofsole Fit can be found at [employer] in [place].  You can also find them online at Sofsole.com    Spenco can be found online and at [employer] Shop in [facility] on [address], [employer] in [facility] on [address], Gear Running Store in [place] on [address], [employer] in [place] on [address] and [employer] in [place] on [address].    Power Step can be a little harder to find.  Locations include [employer] in [place] on [address], [place] in [facility], [employer] in [place] on [address] and online    Walk-Fit - [employer]     [facility]    **  A good high quality over the counter insert can cost around $40-$50.            BODY  MASS INDEX (BMI)  Many things can cause foot and ankle problems. Foot structure, activity level, foot mechanics and injuries are common causes of pain.    One very important issue that often goes unmentioned, is body weight.  Extra weight can cause increased stress on muscles, ligaments, bones and tendons. Sometimes just a few extra pounds is all it takes to put one over her/his threshold. Without reducing that stress, it can be difficult to alleviate pain.      Some people are uncomfortable addressing this issue, but we feel it is important for you to think about it. As Foot & Ankle specialists, our job is addressing the lower extremity problem and possible causes.     Regarding extra body weight, we encourage patients to discuss diet and weight management plans with their primary care doctors. It is this team approach that gives you the best opportunity for pain relief and getting you back on your feet.

## 2017-05-25 ENCOUNTER — OFFICE VISIT (OUTPATIENT)
Dept: SURGERY | Facility: CLINIC | Age: 57
End: 2017-05-25
Payer: MEDICAID

## 2017-05-25 VITALS
SYSTOLIC BLOOD PRESSURE: 122 MMHG | BODY MASS INDEX: 25.39 KG/M2 | HEIGHT: 66 IN | HEART RATE: 71 BPM | DIASTOLIC BLOOD PRESSURE: 78 MMHG | OXYGEN SATURATION: 96 % | WEIGHT: 158 LBS

## 2017-05-25 DIAGNOSIS — K40.21 BILATERAL RECURRENT INGUINAL HERNIA WITHOUT OBSTRUCTION OR GANGRENE: Primary | ICD-10-CM

## 2017-05-25 PROCEDURE — T1013 SIGN LANG/ORAL INTERPRETER: HCPCS | Mod: U3 | Performed by: SURGERY

## 2017-05-25 PROCEDURE — 99203 OFFICE O/P NEW LOW 30 MIN: CPT | Performed by: SURGERY

## 2017-05-25 ASSESSMENT — ENCOUNTER SYMPTOMS: CLAUDICATION: 1

## 2017-05-25 NOTE — PROGRESS NOTES
HPI      ROS (Review of Systems):     Cardiovascular: Positive for leg pain.          Physical Exam      Assessment:   Luis Yates is seen in consultation for a recurrent left inguinal hernia, at the request of PAYAL Baker CNP.    Recurrent, reducible bilateral inguinal hernias.    Plan:    The patient has had open bilateral inguinal hernia repairs.  I therefore recommend he see Dr. Gray who specializes in laparoscopic recurrent herniorrhaphies.  Dr. Gray is able to see him in the clinic today to discuss further.       HPI:  Luis is a 56 year old male who presents for evaluation of left groin pain and a bulge.  He underwent primary open bilateral inguinal [facility]iorrhaph[facility] [place] [facility] ago.  The pain is described as burning and pulling.  The pain occurs with straining.  Negative for associated symptoms of nausea and vomiting.  He denies symptoms on the right.    Constipation: Yes  Diabetes: No  Current smoker: No      Past Medical History:   has a past medical history of Gout; HTN (hypertension); and HTN (hypertension), benign.    Past Surgical History:  Past Surgical History:   Procedure Laterality Date     HERNIA REPAIR  2010    left      HERNIA REPAIR  2010        Social History:  Social History     Social History     Marital status:      Spouse name: N/A     Number of children: N/A     Years of education: N/A     Occupational History     Not on file.     Social History Main Topics     Smoking status: Never Smoker     Smokeless tobacco: Not on file     Alcohol use No     Drug use: No     Sexual activity: No     Other Topics Concern     Not on file     Social History Narrative    ** Merged History Encounter **             Family History:  Family History   Problem Relation Age of Onset     Unknown/Adopted Mother      Unknown/Adopted Father        ROS:  The 10 point review of systems is negative other than noted in the HPI and above.    PE:    Vitals: /78  Pulse  "71  Ht 5' 6\" (1.676 m)  Wt 158 lb (71.7 kg)  SpO2 96%  BMI 25.5 kg/m2  BMI= Body mass index is 25.5 kg/(m^2).  General- Well-developed, well-nourished, patient able to get up on table without difficulty.  HEENT- Normocephalic and atraumatic. Pupils equal and round.  Mucous membranes moist.  Sclera are nonicteric.  Neck- No lymphadenopathy or masses   Respirations- are regular and non labored  Abdomen- abdomen is soft without significant tenderness, masses, organomegaly or guarding  Hernia- Left inguinal hernia is present spontaneously              Small right inguinal hernia is present spontaneously              The hernias are manually reducible              Testes are descended bilaterally and normal   Tiny, reducible umbilical hernia, nontender  Extremities- without edema  Psych- mood and affect are appropriate  Neurologic- alert, speech is clear, moves all extremities with good strength  Integument- without lesions, rashes, or jaundice      This note was created using voice recognition software. Undetected word substitutions or other errors may have occurred.     Time spent with the patient with greater that 50% of the time in discussion was 10 minutes.     Cinthia Kyle MD    Please route or send letter to:  Primary Care Provider (PCP) and Referring Provider    "

## 2017-05-25 NOTE — MR AVS SNAPSHOT
After Visit Summary   5/25/2017    Luis Yates    MRN: [id number]           Patient Information     Date Of Birth          1960        Visit Information        Provider Department      5/25/2017 10:15 AM Cinthia Kyle MD; [employer] [facility] Hernia      Today's Diagnoses     Bilateral recurrent inguinal hernia without obstruction or gangrene    -  1       Follow-ups after your visit        Your next 10 appointments already scheduled     Jun 01, 2017   Procedure with Pola Barbosa MD   [facility] PeriOp Services (--)    [address]  [facility] [address]   [phone]            Jun 01, 2017  1:00 PM CDT   [facility] Same Day Surgery with Pola Barbosa MD, Dwayne Fitch PA-C   [facility] ([facility])    [facility] ([facility])   [phone]            Jun 14, 2017 10:45 AM CDT   XR JOINT INJECTION INTERM IRMA with RSCCXR2, [facility] IMAGING NURSE, [facility] ([facility])    [address]  [facility] [address]   [phone]           Stop drinking 1 hour before the exam.  You may take your medicines as usual, except for blood thinners (Coumadin, Plavix, Ticlid, Persantine, Aggrenox, Pletal, Effient, Brilliant). Talk to your doctor if you take these.  Tell your doctor if:   You have ever had an allergic reaction to X-ray dye (contrast fluid).   There is a chance you may be pregnant.  Please bring a list of your current medicines to your exam. Include vitamins, minerals and over-the-counter medicines.  Please call the Imaging Department at your exam site with any questions.            Jun 16, 2017  8:40 AM CDT   SHORT with PAYAL Baker CNP   [facility] ([facility]  "[place])    [address]  [facility] [address]   [phone]              Who to contact     If you have questions or need follow up information about today's clinic visit or your schedule please contact [facility] directly at [phone].  Normal or non-critical lab and imaging results will be communicated to you by MyChart, letter or phone within 4 business days after the clinic has received the results. If you do not hear from us within 7 days, please contact the clinic through MyChart or phone. If you have a critical or abnormal lab result, we will notify you by phone as soon as possible.  Submit refill requests through [facility] or call your pharmacy and they will forward the refill request to us. Please allow 3 business days for your refill to be completed.          Additional Information About Your Visit        MyChart Information     [facility] lets you send messages to your doctor, view your test results, renew your prescriptions, schedule appointments and more. To sign up, go to [email or web address] . Click on \"Log in\" on the left side of the screen, which will take you to the Welcome page. Then click on \"Sign up Now\" on the right side of the page.     You will be asked to enter the access code listed below, as well as some personal information. Please follow the directions to create your username and password.     Your access code is: [email or web address]  Expires: 2017  9:27 AM     Your access code will  in 90 days. If you need help or a new code, please call your [place] clinic or [phone].        Care EveryWhere ID     This is your Care EveryWhere ID. This could be used by other organizations to access your [place] medical records  [id number]        Your Vitals Were     Pulse Height Pulse Oximetry BMI (Body Mass Index)          71 5' 6\" (1.676 m) 96% 25.5 kg/m2         Blood Pressure from Last 3 Encounters:   17 135/70   17 122/78   17 " 172/77    Weight from Last 3 Encounters:   05/26/17 155 lb (70.3 kg)   05/25/17 158 lb (71.7 kg)   05/23/17 158 lb (71.7 kg)              Today, you had the following     No orders found for display       Primary Care Provider Office Phone # Fax PAYAL Philippe [phone]       [facility] [address] [facility] [address]        Thank you!     Thank you for choosing [facility]  for your care. Our goal is always to provide you with excellent care. Hearing back from our patients is one way we can continue to improve our services. Please take a few minutes to complete the written survey that you may receive in the mail after your visit with us. Thank you!             Your Updated Medication List - Protect others around you: Learn how to safely use, store and throw away your medicines at www.disposemymeds.org.          This list is accurate as of: 5/25/17 11:59 PM.  Always use your most recent med list.                   Brand Name Dispense Instructions for use    * albuterol 108 (90 BASE) MCG/ACT Inhaler    PROAIR HFA/PROVENTIL HFA/VENTOLIN HFA     Inhale 2 puffs into the lungs every 6 hours       * albuterol (2.5 MG/3ML) 0.083% neb solution     50 vial    Take 1 vial (2.5 mg) by nebulization every 6 hours as needed for shortness of breath / dyspnea or wheezing       * atenolol 50 MG tablet    TENORMIN     Take 50 mg by mouth daily       * atenolol 50 MG tablet    TENORMIN    180 tablet    Take 2 tablets (100 mg) by mouth daily       cefdinir 300 MG capsule    OMNICEF    20 capsule    Take 1 capsule (300 mg) by mouth 2 times daily       COLCRYS PO      Take 0.6 mg by mouth daily       LISINOPRIL PO      Take 5 mg by mouth daily       LOSARTAN POTASSIUM PO      Take 25 mg by mouth daily       order for DME     1 Device    Neb machine and tubing       * Notice:  This list has 4 medication(s) that are the same as other medications prescribed for you.  Read the directions carefully, and ask your doctor or other care provider to review them with you.

## 2017-05-25 NOTE — LETTER
"  May 25, 2017    RE:  Luis Yates-:  56    Assessment:   Luis Yates is seen in consultation for a recurrent left inguinal hernia, at the request of PAYAL Baker CNP.     Recurrent, reducible bilateral inguinal hernias.     Plan:    The patient has had open bilateral inguinal hernia repairs.  I therefore recommend he see Dr. Gray who specializes in laparoscopic recurrent herniorrhaphies.  Dr. Gray is able to see him in the clinic today to discuss further.         HPI:  Luis is a 56 year old male who presents for evaluation of left groin pain and a bulge.  He underwent primary open bilateral inguinal herniorrhaphies in [facility] seven years ago.  The pain is described as burning and pulling.  The pain occurs with straining.  Negative for associated symptoms of nausea and vomiting.  He denies symptoms on the right.     Constipation: Yes  Diabetes: No  Current smoker: No        Past Medical History:  Has a past medical history of Gout; HTN (hypertension); and HTN (hypertension), benign.     ROS:  The 10 point review of systems is negative other than noted in the HPI and above.     PE:    Vitals: /78  Pulse 71  Ht 5' 6\" (1.676 m)  Wt 158 lb (71.7 kg)  SpO2 96%  BMI 25.5 kg/m2  BMI= Body mass index is 25.5 kg/(m^2).  General- Well-developed, well-nourished, patient able to get up on table without difficulty.  HEENT- Normocephalic and atraumatic. Pupils equal and round.  Mucous membranes moist.  Sclera are nonicteric.  Neck- No lymphadenopathy or masses   Respirations- are regular and non labored  Abdomen- abdomen is soft without significant tenderness, masses, organomegaly or guarding  Hernia- Left inguinal hernia is present spontaneously              Small right inguinal hernia is present spontaneously              The hernias are manually reducible              Testes are descended bilaterally and normal                        Tiny, reducible umbilical hernia, " nontender  Extremities- without edema  Psych- mood and affect are appropriate  Neurologic- alert, speech is clear, moves all extremities with good strength  Integument- without lesions, rashes, or jaundice        This note was created using voice recognition software. Undetected word substitutions or other errors may have occurred.      Cinthia Kyle MD

## 2017-05-26 ENCOUNTER — OFFICE VISIT (OUTPATIENT)
Dept: INTERNAL MEDICINE | Facility: CLINIC | Age: 57
End: 2017-05-26
Payer: MEDICAID

## 2017-05-26 VITALS
HEIGHT: 66 IN | SYSTOLIC BLOOD PRESSURE: 135 MMHG | BODY MASS INDEX: 24.91 KG/M2 | TEMPERATURE: 97.9 F | DIASTOLIC BLOOD PRESSURE: 70 MMHG | HEART RATE: 72 BPM | WEIGHT: 155 LBS | OXYGEN SATURATION: 97 %

## 2017-05-26 DIAGNOSIS — Z01.818 PREOP GENERAL PHYSICAL EXAM: Primary | ICD-10-CM

## 2017-05-26 DIAGNOSIS — K40.21 BILATERAL RECURRENT INGUINAL HERNIA WITHOUT OBSTRUCTION OR GANGRENE: ICD-10-CM

## 2017-05-26 DIAGNOSIS — I10 BENIGN ESSENTIAL HYPERTENSION: ICD-10-CM

## 2017-05-26 PROCEDURE — 93000 ELECTROCARDIOGRAM COMPLETE: CPT | Performed by: FAMILY MEDICINE

## 2017-05-26 PROCEDURE — 99214 OFFICE O/P EST MOD 30 MIN: CPT | Performed by: FAMILY MEDICINE

## 2017-05-26 PROCEDURE — T1013 SIGN LANG/ORAL INTERPRETER: HCPCS | Mod: U3 | Performed by: FAMILY MEDICINE

## 2017-05-26 NOTE — NURSING NOTE
"Chief Complaint   Patient presents with     Pre-Op Exam       Initial /70 (BP Location: Right arm, Patient Position: Chair, Cuff Size: Adult Regular)  Pulse 72  Temp 97.9  F (36.6  C) (Oral)  Wt 155 lb (70.3 kg)  SpO2 97%  BMI 25.02 kg/m2 Estimated body mass index is 25.02 kg/(m^2) as calculated from the following:    Height as of 5/25/17: 5' 6\" (1.676 m).    Weight as of this encounter: 155 lb (70.3 kg).  Medication Reconciliation: complete     Nasra Turner, WATSON      "

## 2017-05-26 NOTE — PROGRESS NOTES
[facility]  [address]  [facility] [address]  [phone]  Dept: [phone]    PRE-OP EVALUATION:  Today's date: 2017    Luis Yates (: 1960) presents for pre-operative evaluation assessment as requested by Dr. Barbosa.  He requires evaluation and anesthesia risk assessment prior to undergoing surgery/procedure for treatment of Hernia .    Date of Surgery/ Procedure: 2017  Time of Surgery/ Procedure:   Hospital/Surgical Facility: [facility]    Primary Physician: Carola Grullon  Type of Anesthesia Anticipated: General, has tolerated anesthesia in the past    Patient has a Health Care Directive or Living Will:  NO    1. NO - Do you have a history of heart attack, stroke, stent, bypass or surgery on an artery in the head, neck, heart or legs?  2. NO - Do you ever have any pain or discomfort in your chest?  3. NO - Do you have a history of  Heart Failure?  4. NO - Are you troubled by shortness of breath when: walking on the level, up a slight hill or at night?  5. YES - Do you currently have a cold, bronchitis or other respiratory infection?  Getting better on abx  6. YES - Do you have a cough, shortness of breath or wheezing?  7. NO - Do you sometimes get pains in the calves of your legs when you walk?  8. NO - Do you or anyone in your family have previous history of blood clots?  9. NO - Do you or does anyone in your family have a serious bleeding problem such as prolonged bleeding following surgeries or cuts?  10. NO - Have you ever had problems with anemia or been told to take iron pills?  11. NO - Have you had any abnormal blood loss such as black, tarry or bloody stools, or abnormal vaginal bleeding?  12. NO - Have you ever had a blood transfusion?  13. NO - Have you or any of your relatives ever had problems with anesthesia?  14. NO - Do you have sleep apnea, excessive snoring or daytime drowsiness?  15. NO - Do you have any prosthetic  heart valves?  16. NO - Do you have prosthetic joints?  17. NO - Is there any chance that you may be pregnant?      HPI:                                                      Brief HPI related to upcoming procedure: bilateral inguinal hernias x 8 months.   Symptomatic      See problem list for active medical problems.  Problems all longstanding and stable, except as noted/documented.  See ROS for pertinent symptoms related to these conditions.                                                                                                  .    MEDICAL HISTORY:                                                      Patient Active Problem List    Diagnosis Date Noted     Hypercholesteremia 05/23/2017     Priority: Medium     HTN (hypertension)      Priority: Medium     Gout      Priority: Medium     HTN (hypertension), benign      Priority: Medium     Gout      Priority: Medium      Past Medical History:   Diagnosis Date     Gout      HTN (hypertension)      HTN (hypertension), benign      Past Surgical History:   Procedure Laterality Date     HERNIA REPAIR  2010    left      HERNIA REPAIR  2010     Current Outpatient Prescriptions   Medication Sig Dispense Refill     atenolol (TENORMIN) 50 MG tablet Take 2 tablets (100 mg) by mouth daily 180 tablet 1     atorvastatin (LIPITOR) 20 MG tablet Take 1 tablet (20 mg) by mouth daily 90 tablet 1     LOSARTAN POTASSIUM PO Take 25 mg by mouth daily       albuterol (PROAIR HFA/PROVENTIL HFA/VENTOLIN HFA) 108 (90 BASE) MCG/ACT Inhaler Inhale 2 puffs into the lungs every 6 hours       albuterol (2.5 MG/3ML) 0.083% neb solution Take 1 vial (2.5 mg) by nebulization every 6 hours as needed for shortness of breath / dyspnea or wheezing 50 vial 1     order for DME Neb machine and tubing 1 Device prn     cefdinir (OMNICEF) 300 MG capsule Take 1 capsule (300 mg) by mouth 2 times daily 20 capsule 0     predniSONE (DELTASONE) 10 MG tablet Take 3 tabs daily for 3 days Take 2 tabs daily for 3  "days Take 1 tab daily for 3 days Then stop 18 tablet 0     atenolol (TENORMIN) 50 MG tablet Take 50 mg by mouth daily       Colchicine (COLCRYS PO) Take 0.6 mg by mouth daily       LISINOPRIL PO Take 5 mg by mouth daily       indomethacin (INDOCIN) 25 MG capsule Take 1 capsule (25 mg) by mouth 3 times daily (with meals) 42 capsule 1     OTC products: None, except as noted above    No Known Allergies   Latex Allergy: NO    Social History   Substance Use Topics     Smoking status: Never Smoker     Smokeless tobacco: Not on file     Alcohol use No     History   Drug Use No       REVIEW OF SYSTEMS:                                                    C: NEGATIVE for fever, chills, change in weight  E/M: NEGATIVE for ear, mouth and throat problems  R: NEGATIVE for significant cough or SOB  CV: NEGATIVE for chest pain, palpitations or peripheral edema    EXAM:                                                    /70 (BP Location: Right arm, Patient Position: Chair, Cuff Size: Adult Regular)  Pulse 72  Temp 97.9  F (36.6  C) (Oral)  Ht 5' 6\" (1.676 m)  Wt 155 lb (70.3 kg)  SpO2 97%  BMI 25.02 kg/m2    GENERAL APPEARANCE: healthy, alert and no distress     EYES: EOMI,  PERRL     HENT: ear canals and TM's normal and nose and mouth without ulcers or lesions     NECK: no adenopathy, no asymmetry, masses, or scars and thyroid normal to palpation     RESP: lungs clear to auscultation - no rales, rhonchi or wheezes     CV: regular rates and rhythm, normal S1 S2, no S3 or S4 and no murmur, click or rub     MS: extremities normal- no gross deformities noted, no evidence of inflammation in joints, FROM in all extremities.     SKIN: no suspicious lesions or rashes     NEURO: Normal strength and tone, sensory exam grossly normal, mentation intact and speech normal     PSYCH: mentation appears normal. and affect normal/bright     LYMPHATICS: No axillary, cervical, or supraclavicular nodes    DIAGNOSTICS:                         "                            EKG: appears normal, NSR, normal axis, normal intervals, no acute ST/T changes c/w ischemia, no LVH by voltage criteria, unchanged from previous tracings    Recent Labs   Lab Test  05/18/17   1244   HGB  12.9*   PLT  285   NA  144   POTASSIUM  4.7   CR  0.69        IMPRESSION:                                                    Reason for surgery/procedure: bilateral inguinal hernias  Diagnosis/reason for consult: preoperative clearance    The proposed surgical procedure is considered LOW risk.    REVISED CARDIAC RISK INDEX  The patient has the following serious cardiovascular risks for perioperative complications such as (MI, PE, VFib and 3  AV Block):  No serious cardiac risks  INTERPRETATION: 0 risks: Class I (very low risk - 0.4% complication rate)    The patient has the following additional risks for perioperative complications:  No identified additional risks      ICD-10-CM    1. Preop general physical exam Z01.818    2. Bilateral recurrent inguinal hernia without obstruction or gangrene K40.21    3. Benign essential hypertension I10        RECOMMENDATIONS:                                                        --Patient is to take all scheduled medications on the day of surgery EXCEPT for modifications listed below.    APPROVAL GIVEN to proceed with proposed procedure, without further diagnostic evaluation       Signed Electronically by: Andrea Mendoza MD    Copy of this evaluation report is provided to requesting physician.    [facility] Preop Guidelines

## 2017-05-26 NOTE — PROGRESS NOTES
"Foot & Ankle Surgery  May 26, 2017    CC: \"Pain both foot\"    I was asked to see Luis Yates regarding the chief complaint by:  STEPHANIA Grullon    HPI:  Pt is a 56 year old male who presents with above complaint.  Bilateral foot pain for years, \"I can't walk, burning, shooting, deep ache\".  Has tried an ice pack that sometimes helps.  He was seen by STEPHANIA Grullon, and labs were ordered. Uric acid was high normal.  Films read as positive for erorisve changes consistent with gout. However, there are no actual images in his EPIC chart or under his MRN in the PACS system. He also complains of bilateral swelling and bilateral arch pain    ROS:   Pos for CC.  The patient denies current nausea, vomiting, chills, fevers, belly pain, calf pain, chest pain or SOB.  Complete remainder of ROS is otherwise neg.    VITALS:    Vitals:    05/23/17 0944   BP: 122/70   Weight: 155 lb 1.6 oz (70.4 kg)   Height: 5' 3\" (1.6 m)       PMH:    Past Medical History:   Diagnosis Date     Gout      HTN (hypertension)      HTN (hypertension), benign        SXHX:    Past Surgical History:   Procedure Laterality Date     HERNIA REPAIR  2010    left      HERNIA REPAIR  2010        MEDS:    Current Outpatient Prescriptions   Medication     atenolol (TENORMIN) 50 MG tablet     Colchicine (COLCRYS PO)     LISINOPRIL PO     indomethacin (INDOCIN) 25 MG capsule     atenolol (TENORMIN) 50 MG tablet     atorvastatin (LIPITOR) 20 MG tablet     LOSARTAN POTASSIUM PO     albuterol (PROAIR HFA/PROVENTIL HFA/VENTOLIN HFA) 108 (90 BASE) MCG/ACT Inhaler     albuterol (2.5 MG/3ML) 0.083% neb solution     order for DME     cefdinir (OMNICEF) 300 MG capsule     predniSONE (DELTASONE) 10 MG tablet     No current facility-administered medications for this visit.        ALL:   No Known Allergies    FMH:    Family History   Problem Relation Age of Onset     Unknown/Adopted Mother      Unknown/Adopted Father        SocHx:    Social History     Social History     Marital " status:      Spouse name: N/A     Number of children: N/A     Years of education: N/A     Occupational History     Not on file.     Social History Main Topics     Smoking status: Never Smoker     Smokeless tobacco: Not on file     Alcohol use No     Drug use: No     Sexual activity: No     Other Topics Concern     Not on file     Social History Narrative    ** Merged History Encounter **                EXAMINATION:  Gen:   No apparent distress  Neuro:   A&Ox3, no deficits  Psych:    Answering questions appropriately for age and situation with normal affect  Head:    NCAT  Eye:    Visual scanning without deficit  Ear:    Response to auditory stimuli wnl  Lung:    Non-labored breathing on RA noted  Abd:    NTND per patient report  Lymph:    Mild bilateral lower extremity edema  Vasc:    Pulses palpable, CFT minimally delayed  Neuro:    Light touch sensation intact to all sensory nerve distributions without paresthesias  Derm:    Neg for nodules, lesions or ulcerations  MSK:    Mild arch pain along central band of PF bilateral.  1st MPJ spurring, pain bilateral, pain and limited motion with PROM of both joints.  Calf:    Neg for redness, swelling or tenderness      Imaging:  IMPRESSION: Soft tissue prominence at the first metatarsal heads with  possible subtle erosive change on the left, consistent with patient's  history of gout. No fractures are seen on either side.    Labs:    Component      Latest Ref Rng & Units 5/18/2017   WBC      4.0 - 11.0 10e9/L 7.5   RBC Count      4.4 - 5.9 10e12/L 4.15 (L)   Hemoglobin      13.3 - 17.7 g/dL 12.9 (L)   Hematocrit      40.0 - 53.0 % 39.7 (L)   MCV      78 - 100 fl 96   MCH      26.5 - 33.0 pg 31.1   MCHC      31.5 - 36.5 g/dL 32.5   RDW      10.0 - 15.0 % 12.9   Platelet Count      150 - 450 10e9/L 285   Diff Method       Automated Method   % Neutrophils      % 41.8   % Lymphocytes      % 30.4   % Monocytes      % 9.1   % Eosinophils      % 18.2   % Basophils      %  0.5   Absolute Neutrophil      1.6 - 8.3 10e9/L 3.1   Absolute Lymphocytes      0.8 - 5.3 10e9/L 2.3   Absolute Monocytes      0.0 - 1.3 10e9/L 0.7   Absolute Eosinophils      0.0 - 0.7 10e9/L 1.4 (H)   Absolute Basophils      0.0 - 0.2 10e9/L 0.0   Sodium      133 - 144 mmol/L 144   Potassium      3.4 - 5.3 mmol/L 4.7   Chloride      94 - 109 mmol/L 107   Carbon Dioxide      20 - 32 mmol/L 25   Anion Gap      3 - 14 mmol/L 12   Glucose      70 - 99 mg/dL 106 (H)   Urea Nitrogen      7 - 30 mg/dL 7   Creatinine      0.66 - 1.25 mg/dL 0.69   GFR Estimate      >60 mL/min/1.7m2 >90 . . .   GFR Estimate If Black      >60 mL/min/1.7m2 >90 . . .   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  Bilateral hallux limitus/gout  -RICE/NSAID; ice vs heat  -supportive comfortable shoes  -fluoro-guided steroid injections bilateral 1st MPJ @ [facility] ordered    2.  Bilateral lower extremity edema  -tensogrip compression sleeves dispensed  -elevate feet at/above hip level 30m TID    3.  Bilateral arch pain  -comfortable shoes, minimize shoeless ambulation  -OTC inserts  -RICE/NSAID prn      Follow up:  prn      Patient's medical history was reviewed today    Body mass index is 27.47 kg/(m^2).          Tank Menjivar DPM   Podiatric Foot & Ankle Surgeon  [facility]  [phone]

## 2017-05-26 NOTE — MR AVS SNAPSHOT
After Visit Summary   5/26/2017    Luis Yates    MRN: [id number]           Patient Information     Date Of Birth          1960        Visit Information        Provider Department      5/26/2017 12:45 PM Andrea Mendoza MD; [place] [employer] [facility]        Today's Diagnoses     Preop general physical exam    -  1    Bilateral recurrent inguinal hernia without obstruction or gangrene        Benign essential hypertension          Care Instructions      Before Your Surgery      Call your surgeon if there is any change in your health. This includes signs of a cold or flu (such as a sore throat, runny nose, cough, rash or fever).    Do not smoke, drink alcohol or take over the counter medicine (unless your surgeon or primary care doctor tells you to) for the 24 hours before and after surgery.    If you take prescribed drugs: Follow your doctor s orders about which medicines to take and which to stop until after surgery.    Eating and drinking prior to surgery: follow the instructions from your surgeon    Take a shower or bath the night before surgery. Use the soap your surgeon gave you to gently clean your skin. If you do not have soap from your surgeon, use your regular soap. Do not shave or scrub the surgery site.  Wear clean pajamas and have clean sheets on your bed.           Follow-ups after your visit        Your next 10 appointments already scheduled     Jun 01, 2017   Procedure with Pola Barbosa MD   [facility] (--)    [address] [facility] [address]   [phone]            Jun 01, 2017  1:00 PM CDT   [facility] Same Day Surgery with Pola Barbosa MD, Dwayne Fitch PA-C   [facility] ([facility])    [facility] ([facility])   [phone]            Jun 07, 2017 10:00 AM CDT   XR JOINT INJECTION  INTERM IRMA with [email or web address], [facility] IMAGING NURSE, [facility] ([facility])    [address] [address]  [facility] [place] [id number] [phone]           Stop drinking 1 hour before the exam.  You may take your medicines as usual, except for blood thinners (Coumadin, Plavix, Ticlid, Persantine, Aggrenox, Pletal, Effient, Brilliant). Talk to your doctor if you take these.  Tell your doctor if:   You have ever had an allergic reaction to X-ray dye (contrast fluid).   There is a chance you may be pregnant.  Please bring a list of your current medicines to your exam. Include vitamins, minerals and over-the-counter medicines.  Please call the Imaging Department at your exam site with any questions.            Jun 16, 2017  8:40 AM CDT   SHORT with PAYAL Baker CNP   [facility] ([facility])    [address]  [facility] [place] [address] [phone]              Who to contact     If you have questions or need follow up information about today's clinic visit or your schedule please contact [facility] directly at [phone].  Normal or non-critical lab and imaging results will be communicated to you by MyChart, letter or phone within 4 business days after the clinic has received the results. If you do not hear from us within 7 days, please contact the clinic through MyChart or phone. If you have a critical or abnormal lab result, we will notify you by phone as soon as possible.  Submit refill requests through [employer] or call your pharmacy and they will forward the refill request to us. Please allow 3 business days for your refill to be completed.          Additional Information About Your Visit        [employer] Information     [employer] lets you send messages to your doctor, view your test results, renew your prescriptions, schedule appointments and more. To sign up, go to [email or web address] .  "Click on \"Log in\" on the left side of the screen, which will take you to the Welcome page. Then click on \"Sign up Now\" on the right side of the page.     You will be asked to enter the access code listed below, as well as some personal information. Please follow the directions to create your username and password.     Your access code is: [email or web address]  Expires: 2017  9:27 AM     Your access code will  in 90 days. If you need help or a new code, please call your [facility] clinic or [phone].        Care EveryWhere ID     This is your Care EveryWhere ID. This could be used by other organizations to access your [facility] medical records  [id number]        Your Vitals Were     Pulse Temperature Height Pulse Oximetry BMI (Body Mass Index)       72 97.9  F (36.6  C) (Oral) 5' 6\" (1.676 m) 97% 25.02 kg/m2        Blood Pressure from Last 3 Encounters:   17 135/70   17 122/78   17 172/77    Weight from Last 3 Encounters:   17 155 lb (70.3 kg)   17 158 lb (71.7 kg)   17 158 lb (71.7 kg)              We Performed the Following     EKG 12-lead complete w/read - Clinics          Today's Medication Changes          These changes are accurate as of: 17  1:34 PM.  If you have any questions, ask your nurse or doctor.               Stop taking these medicines if you haven't already. Please contact your care team if you have questions.     LISINOPRIL PO   Stopped by:  Andrea Mendoza MD                    Primary Care Provider Office Phone # Fax #    PAYAL Baker [facility] [phone]       [facility] [address] [facility] [address]        Thank you!     Thank you for choosing [facility]  for your care. Our goal is always to provide you with excellent care. Hearing back from our patients is one way we can continue to improve our services. Please take a few minutes to complete the written survey that you may receive in " the mail after your visit with us. Thank you!             Your Updated Medication List - Protect others around you: Learn how to safely use, store and throw away your medicines at www.disposemymeds.org.          This list is accurate as of: 5/26/17  1:34 PM.  Always use your most recent med list.                   Brand Name Dispense Instructions for use    * albuterol 108 (90 BASE) MCG/ACT Inhaler    PROAIR HFA/PROVENTIL HFA/VENTOLIN HFA     Inhale 2 puffs into the lungs every 6 hours       * albuterol (2.5 MG/3ML) 0.083% neb solution     50 vial    Take 1 vial (2.5 mg) by nebulization every 6 hours as needed for shortness of breath / dyspnea or wheezing       * atenolol 50 MG tablet    TENORMIN     Take 50 mg by mouth daily       * atenolol 50 MG tablet    TENORMIN    180 tablet    Take 2 tablets (100 mg) by mouth daily       atorvastatin 20 MG tablet    LIPITOR    90 tablet    Take 1 tablet (20 mg) by mouth daily       cefdinir 300 MG capsule    OMNICEF    20 capsule    Take 1 capsule (300 mg) by mouth 2 times daily       COLCRYS PO      Take 0.6 mg by mouth daily       LOSARTAN POTASSIUM PO      Take 25 mg by mouth daily       order for DME     1 Device    Neb machine and tubing       * Notice:  This list has 4 medication(s) that are the same as other medications prescribed for you. Read the directions carefully, and ask your doctor or other care provider to review them with you.

## 2017-05-31 NOTE — H&P (VIEW-ONLY)
[facility]  [address]  [place] [address]  [phone]  Dept: [phone]    PRE-OP EVALUATION:  Today's date: 2017    Luis Yates (: 1960) presents for pre-operative evaluation assessment as requested by Dr. Barbosa.  He requires evaluation and anesthesia risk assessment prior to undergoing surgery/procedure for treatment of Hernia .    Date of Surgery/ Procedure: 2017  Time of Surgery/ Procedure:   Hospital/Surgical Facility: [facility]    Primary Physician: Carola Grullon  Type of Anesthesia Anticipated: General, has tolerated anesthesia in the past    Patient has a Health Care Directive or Living Will:  NO    1. NO - Do you have a history of heart attack, stroke, stent, bypass or surgery on an artery in the head, neck, heart or legs?  2. NO - Do you ever have any pain or discomfort in your chest?  3. NO - Do you have a history of  Heart Failure?  4. NO - Are you troubled by shortness of breath when: walking on the level, up a slight hill or at night?  5. YES - Do you currently have a cold, bronchitis or other respiratory infection?  Getting better on abx  6. YES - Do you have a cough, shortness of breath or wheezing?  7. NO - Do you sometimes get pains in the calves of your legs when you walk?  8. NO - Do you or anyone in your family have previous history of blood clots?  9. NO - Do you or does anyone in your family have a serious bleeding problem such as prolonged bleeding following surgeries or cuts?  10. NO - Have you ever had problems with anemia or been told to take iron pills?  11. NO - Have you had any abnormal blood loss such as black, tarry or bloody stools, or abnormal vaginal bleeding?  12. NO - Have you ever had a blood transfusion?  13. NO - Have you or any of your relatives ever had problems with anesthesia?  14. NO - Do you have sleep apnea, excessive snoring or daytime drowsiness?  15. NO - Do you have any prosthetic  heart valves?  16. NO - Do you have prosthetic joints?  17. NO - Is there any chance that you may be pregnant?      HPI:                                                      Brief HPI related to upcoming procedure: bilateral inguinal hernias x 8 months.   Symptomatic      See problem list for active medical problems.  Problems all longstanding and stable, except as noted/documented.  See ROS for pertinent symptoms related to these conditions.                                                                                                  .    MEDICAL HISTORY:                                                      Patient Active Problem List    Diagnosis Date Noted     Hypercholesteremia 05/23/2017     Priority: Medium     HTN (hypertension)      Priority: Medium     Gout      Priority: Medium     HTN (hypertension), benign      Priority: Medium     Gout      Priority: Medium      Past Medical History:   Diagnosis Date     Gout      HTN (hypertension)      HTN (hypertension), benign      Past Surgical History:   Procedure Laterality Date     HERNIA REPAIR  2010    left      HERNIA REPAIR  2010     Current Outpatient Prescriptions   Medication Sig Dispense Refill     atenolol (TENORMIN) 50 MG tablet Take 2 tablets (100 mg) by mouth daily 180 tablet 1     atorvastatin (LIPITOR) 20 MG tablet Take 1 tablet (20 mg) by mouth daily 90 tablet 1     LOSARTAN POTASSIUM PO Take 25 mg by mouth daily       albuterol (PROAIR HFA/PROVENTIL HFA/VENTOLIN HFA) 108 (90 BASE) MCG/ACT Inhaler Inhale 2 puffs into the lungs every 6 hours       albuterol (2.5 MG/3ML) 0.083% neb solution Take 1 vial (2.5 mg) by nebulization every 6 hours as needed for shortness of breath / dyspnea or wheezing 50 vial 1     order for DME Neb machine and tubing 1 Device prn     cefdinir (OMNICEF) 300 MG capsule Take 1 capsule (300 mg) by mouth 2 times daily 20 capsule 0     predniSONE (DELTASONE) 10 MG tablet Take 3 tabs daily for 3 days Take 2 tabs daily for 3  "days Take 1 tab daily for 3 days Then stop 18 tablet 0     atenolol (TENORMIN) 50 MG tablet Take 50 mg by mouth daily       Colchicine (COLCRYS PO) Take 0.6 mg by mouth daily       LISINOPRIL PO Take 5 mg by mouth daily       indomethacin (INDOCIN) 25 MG capsule Take 1 capsule (25 mg) by mouth 3 times daily (with meals) 42 capsule 1     OTC products: None, except as noted above    No Known Allergies   Latex Allergy: NO    Social History   Substance Use Topics     Smoking status: Never Smoker     Smokeless tobacco: Not on file     Alcohol use No     History   Drug Use No       REVIEW OF SYSTEMS:                                                    C: NEGATIVE for fever, chills, change in weight  E/M: NEGATIVE for ear, mouth and throat problems  R: NEGATIVE for significant cough or SOB  CV: NEGATIVE for chest pain, palpitations or peripheral edema    EXAM:                                                    /70 (BP Location: Right arm, Patient Position: Chair, Cuff Size: Adult Regular)  Pulse 72  Temp 97.9  F (36.6  C) (Oral)  Ht 5' 6\" (1.676 m)  Wt 155 lb (70.3 kg)  SpO2 97%  BMI 25.02 kg/m2    GENERAL APPEARANCE: healthy, alert and no distress     EYES: EOMI,  PERRL     HENT: ear canals and TM's normal and nose and mouth without ulcers or lesions     NECK: no adenopathy, no asymmetry, masses, or scars and thyroid normal to palpation     RESP: lungs clear to auscultation - no rales, rhonchi or wheezes     CV: regular rates and rhythm, normal S1 S2, no S3 or S4 and no murmur, click or rub     MS: extremities normal- no gross deformities noted, no evidence of inflammation in joints, FROM in all extremities.     SKIN: no suspicious lesions or rashes     NEURO: Normal strength and tone, sensory exam grossly normal, mentation intact and speech normal     PSYCH: mentation appears normal. and affect normal/bright     LYMPHATICS: No axillary, cervical, or supraclavicular nodes    DIAGNOSTICS:                         "                            EKG: appears normal, NSR, normal axis, normal intervals, no acute ST/T changes c/w ischemia, no LVH by voltage criteria, unchanged from previous tracings    Recent Labs   Lab Test  05/18/17   1244   HGB  12.9*   PLT  285   NA  144   POTASSIUM  4.7   CR  0.69        IMPRESSION:                                                    Reason for surgery/procedure: bilateral inguinal hernias  Diagnosis/reason for consult: preoperative clearance    The proposed surgical procedure is considered LOW risk.    REVISED CARDIAC RISK INDEX  The patient has the following serious cardiovascular risks for perioperative complications such as (MI, PE, VFib and 3  AV Block):  No serious cardiac risks  INTERPRETATION: 0 risks: Class I (very low risk - 0.4% complication rate)    The patient has the following additional risks for perioperative complications:  No identified additional risks      ICD-10-CM    1. Preop general physical exam Z01.818    2. Bilateral recurrent inguinal hernia without obstruction or gangrene K40.21    3. Benign essential hypertension I10        RECOMMENDATIONS:                                                        --Patient is to take all scheduled medications on the day of surgery EXCEPT for modifications listed below.    APPROVAL GIVEN to proceed with proposed procedure, without further diagnostic evaluation       Signed Electronically by: Andrea Mendoza MD    Copy of this evaluation report is provided to requesting physician.    [facility] Preop Guidelines

## 2017-06-01 ENCOUNTER — ANESTHESIA EVENT (OUTPATIENT)
Dept: SURGERY | Facility: CLINIC | Age: 57
End: 2017-06-01
Payer: COMMERCIAL

## 2017-06-01 ENCOUNTER — APPOINTMENT (OUTPATIENT)
Dept: SURGERY | Facility: PHYSICIAN GROUP | Age: 57
End: 2017-06-01
Payer: COMMERCIAL

## 2017-06-01 ENCOUNTER — ANESTHESIA (OUTPATIENT)
Dept: SURGERY | Facility: CLINIC | Age: 57
End: 2017-06-01
Payer: COMMERCIAL

## 2017-06-01 ENCOUNTER — HOSPITAL ENCOUNTER (OUTPATIENT)
Facility: CLINIC | Age: 57
Discharge: HOME | End: 2017-06-01
Attending: SURGERY | Admitting: SURGERY
Payer: COMMERCIAL

## 2017-06-01 VITALS
TEMPERATURE: 97.7 F | DIASTOLIC BLOOD PRESSURE: 83 MMHG | BODY MASS INDEX: 24.91 KG/M2 | SYSTOLIC BLOOD PRESSURE: 166 MMHG | HEIGHT: 66 IN | RESPIRATION RATE: 15 BRPM | WEIGHT: 155 LBS | OXYGEN SATURATION: 98 %

## 2017-06-01 DIAGNOSIS — K40.21 BILATERAL RECURRENT INGUINAL HERNIA WITHOUT OBSTRUCTION OR GANGRENE: Primary | ICD-10-CM

## 2017-06-01 PROCEDURE — 37000008 ZZH ANESTHESIA TECHNICAL FEE, 1ST 30 MIN: Performed by: SURGERY

## 2017-06-01 PROCEDURE — 36000058 ZZH SURGERY LEVEL 3 EA 15 ADDTL MIN: Performed by: SURGERY

## 2017-06-01 PROCEDURE — 25000128 H RX IP 250 OP 636: Performed by: SURGERY

## 2017-06-01 PROCEDURE — 25000125 ZZHC RX 250: Performed by: NURSE ANESTHETIST, CERTIFIED REGISTERED

## 2017-06-01 PROCEDURE — 40000306 ZZH STATISTIC PRE PROC ASSESS II: Performed by: SURGERY

## 2017-06-01 PROCEDURE — 71000013 ZZH RECOVERY PHASE 1 LEVEL 1 EA ADDTL HR: Performed by: SURGERY

## 2017-06-01 PROCEDURE — 37000009 ZZH ANESTHESIA TECHNICAL FEE, EACH ADDTL 15 MIN: Performed by: SURGERY

## 2017-06-01 PROCEDURE — C1727 CATH, BAL TIS DIS, NON-VAS: HCPCS | Performed by: SURGERY

## 2017-06-01 PROCEDURE — 25000566 ZZH SEVOFLURANE, EA 15 MIN: Performed by: SURGERY

## 2017-06-01 PROCEDURE — 36000056 ZZH SURGERY LEVEL 3 1ST 30 MIN: Performed by: SURGERY

## 2017-06-01 PROCEDURE — 71000027 ZZH RECOVERY PHASE 2 EACH 15 MINS: Performed by: SURGERY

## 2017-06-01 PROCEDURE — S0020 INJECTION, BUPIVICAINE HYDRO: HCPCS | Performed by: SURGERY

## 2017-06-01 PROCEDURE — 27210794 ZZH OR GENERAL SUPPLY STERILE: Performed by: SURGERY

## 2017-06-01 PROCEDURE — 71000012 ZZH RECOVERY PHASE 1 LEVEL 1 FIRST HR: Performed by: SURGERY

## 2017-06-01 PROCEDURE — 25000125 ZZHC RX 250: Performed by: ANESTHESIOLOGY

## 2017-06-01 PROCEDURE — 49651 LAP ING HERNIA REPAIR RECUR: CPT | Mod: 50 | Performed by: SURGERY

## 2017-06-01 PROCEDURE — 25000125 ZZHC RX 250: Performed by: SURGERY

## 2017-06-01 PROCEDURE — C1781 MESH (IMPLANTABLE): HCPCS | Performed by: SURGERY

## 2017-06-01 PROCEDURE — 49651 LAP ING HERNIA REPAIR RECUR: CPT | Mod: AS | Performed by: PHYSICIAN ASSISTANT

## 2017-06-01 PROCEDURE — 25000128 H RX IP 250 OP 636: Performed by: ANESTHESIOLOGY

## 2017-06-01 PROCEDURE — 25000128 H RX IP 250 OP 636: Performed by: NURSE ANESTHETIST, CERTIFIED REGISTERED

## 2017-06-01 DEVICE — MESH PROGRIP LAPAROSCOPIC 5.9X3.9" PARIETEX SELF-FIX LPG1510: Type: IMPLANTABLE DEVICE | Site: GROIN | Status: FUNCTIONAL

## 2017-06-01 RX ORDER — DROPERIDOL 2.5 MG/ML
0.62 INJECTION, SOLUTION INTRAMUSCULAR; INTRAVENOUS
Status: DISCONTINUED | OUTPATIENT
Start: 2017-06-01 | End: 2017-06-01

## 2017-06-01 RX ORDER — ALBUTEROL SULFATE 0.83 MG/ML
2.5 SOLUTION RESPIRATORY (INHALATION) EVERY 4 HOURS PRN
Status: DISCONTINUED | OUTPATIENT
Start: 2017-06-01 | End: 2017-06-01 | Stop reason: HOSPADM

## 2017-06-01 RX ORDER — ONDANSETRON 2 MG/ML
INJECTION INTRAMUSCULAR; INTRAVENOUS PRN
Status: DISCONTINUED | OUTPATIENT
Start: 2017-06-01 | End: 2017-06-01

## 2017-06-01 RX ORDER — PROPOFOL 10 MG/ML
INJECTION, EMULSION INTRAVENOUS PRN
Status: DISCONTINUED | OUTPATIENT
Start: 2017-06-01 | End: 2017-06-01

## 2017-06-01 RX ORDER — HYDROCODONE BITARTRATE AND ACETAMINOPHEN 5; 325 MG/1; MG/1
1-2 TABLET ORAL EVERY 4 HOURS PRN
Qty: 30 TABLET | Refills: 0 | Status: SHIPPED | OUTPATIENT
Start: 2017-06-01 | End: 2017-07-18

## 2017-06-01 RX ORDER — LIDOCAINE HYDROCHLORIDE 10 MG/ML
INJECTION, SOLUTION INFILTRATION; PERINEURAL PRN
Status: DISCONTINUED | OUTPATIENT
Start: 2017-06-01 | End: 2017-06-01

## 2017-06-01 RX ORDER — HYDROMORPHONE HYDROCHLORIDE 1 MG/ML
.3-.5 INJECTION, SOLUTION INTRAMUSCULAR; INTRAVENOUS; SUBCUTANEOUS EVERY 5 MIN PRN
Status: DISCONTINUED | OUTPATIENT
Start: 2017-06-01 | End: 2017-06-01 | Stop reason: HOSPADM

## 2017-06-01 RX ORDER — MEPERIDINE HYDROCHLORIDE 25 MG/ML
12.5 INJECTION INTRAMUSCULAR; INTRAVENOUS; SUBCUTANEOUS EVERY 5 MIN PRN
Status: DISCONTINUED | OUTPATIENT
Start: 2017-06-01 | End: 2017-06-01 | Stop reason: HOSPADM

## 2017-06-01 RX ORDER — KETOROLAC TROMETHAMINE 30 MG/ML
INJECTION, SOLUTION INTRAMUSCULAR; INTRAVENOUS PRN
Status: DISCONTINUED | OUTPATIENT
Start: 2017-06-01 | End: 2017-06-01

## 2017-06-01 RX ORDER — BUPIVACAINE HYDROCHLORIDE 5 MG/ML
INJECTION, SOLUTION EPIDURAL; INTRACAUDAL PRN
Status: DISCONTINUED | OUTPATIENT
Start: 2017-06-01 | End: 2017-06-01 | Stop reason: HOSPADM

## 2017-06-01 RX ORDER — NEOSTIGMINE METHYLSULFATE 1 MG/ML
VIAL (ML) INJECTION PRN
Status: DISCONTINUED | OUTPATIENT
Start: 2017-06-01 | End: 2017-06-01

## 2017-06-01 RX ORDER — IPRATROPIUM BROMIDE AND ALBUTEROL SULFATE 2.5; .5 MG/3ML; MG/3ML
3 SOLUTION RESPIRATORY (INHALATION) ONCE
Status: COMPLETED | OUTPATIENT
Start: 2017-06-01 | End: 2017-06-01

## 2017-06-01 RX ORDER — DIMENHYDRINATE 50 MG/ML
25 INJECTION, SOLUTION INTRAMUSCULAR; INTRAVENOUS
Status: DISCONTINUED | OUTPATIENT
Start: 2017-06-01 | End: 2017-06-01 | Stop reason: HOSPADM

## 2017-06-01 RX ORDER — ACETAMINOPHEN 10 MG/ML
1000 INJECTION, SOLUTION INTRAVENOUS ONCE
Status: DISCONTINUED | OUTPATIENT
Start: 2017-06-01 | End: 2017-06-01 | Stop reason: HOSPADM

## 2017-06-01 RX ORDER — DEXAMETHASONE SODIUM PHOSPHATE 4 MG/ML
INJECTION, SOLUTION INTRA-ARTICULAR; INTRALESIONAL; INTRAMUSCULAR; INTRAVENOUS; SOFT TISSUE PRN
Status: DISCONTINUED | OUTPATIENT
Start: 2017-06-01 | End: 2017-06-01

## 2017-06-01 RX ORDER — CEFAZOLIN SODIUM 1 G/3ML
1 INJECTION, POWDER, FOR SOLUTION INTRAMUSCULAR; INTRAVENOUS SEE ADMIN INSTRUCTIONS
Status: DISCONTINUED | OUTPATIENT
Start: 2017-06-01 | End: 2017-06-01 | Stop reason: HOSPADM

## 2017-06-01 RX ORDER — FENTANYL CITRATE 50 UG/ML
25-50 INJECTION, SOLUTION INTRAMUSCULAR; INTRAVENOUS
Status: DISCONTINUED | OUTPATIENT
Start: 2017-06-01 | End: 2017-06-01 | Stop reason: HOSPADM

## 2017-06-01 RX ORDER — DIAZEPAM 10 MG/2ML
2.5 INJECTION, SOLUTION INTRAMUSCULAR; INTRAVENOUS
Status: DISCONTINUED | OUTPATIENT
Start: 2017-06-01 | End: 2017-06-01 | Stop reason: HOSPADM

## 2017-06-01 RX ORDER — SODIUM CHLORIDE, SODIUM LACTATE, POTASSIUM CHLORIDE, CALCIUM CHLORIDE 600; 310; 30; 20 MG/100ML; MG/100ML; MG/100ML; MG/100ML
INJECTION, SOLUTION INTRAVENOUS CONTINUOUS
Status: DISCONTINUED | OUTPATIENT
Start: 2017-06-01 | End: 2017-06-01 | Stop reason: HOSPADM

## 2017-06-01 RX ORDER — ONDANSETRON 2 MG/ML
4 INJECTION INTRAMUSCULAR; INTRAVENOUS EVERY 30 MIN PRN
Status: DISCONTINUED | OUTPATIENT
Start: 2017-06-01 | End: 2017-06-01 | Stop reason: HOSPADM

## 2017-06-01 RX ORDER — LIDOCAINE 40 MG/G
CREAM TOPICAL
Status: DISCONTINUED | OUTPATIENT
Start: 2017-06-01 | End: 2017-06-01 | Stop reason: HOSPADM

## 2017-06-01 RX ORDER — HYDROCODONE BITARTRATE AND ACETAMINOPHEN 5; 325 MG/1; MG/1
1-2 TABLET ORAL
Status: DISCONTINUED | OUTPATIENT
Start: 2017-06-01 | End: 2017-06-01 | Stop reason: HOSPADM

## 2017-06-01 RX ORDER — ONDANSETRON 4 MG/1
4 TABLET, ORALLY DISINTEGRATING ORAL EVERY 30 MIN PRN
Status: DISCONTINUED | OUTPATIENT
Start: 2017-06-01 | End: 2017-06-01 | Stop reason: HOSPADM

## 2017-06-01 RX ORDER — GLYCINE 1.5 G/100ML
SOLUTION IRRIGATION PRN
Status: DISCONTINUED | OUTPATIENT
Start: 2017-06-01 | End: 2017-06-01 | Stop reason: HOSPADM

## 2017-06-01 RX ORDER — LABETALOL HYDROCHLORIDE 5 MG/ML
10 INJECTION, SOLUTION INTRAVENOUS
Status: DISCONTINUED | OUTPATIENT
Start: 2017-06-01 | End: 2017-06-01 | Stop reason: HOSPADM

## 2017-06-01 RX ORDER — GLYCOPYRROLATE 0.2 MG/ML
INJECTION, SOLUTION INTRAMUSCULAR; INTRAVENOUS PRN
Status: DISCONTINUED | OUTPATIENT
Start: 2017-06-01 | End: 2017-06-01

## 2017-06-01 RX ORDER — CEFAZOLIN SODIUM 2 G/100ML
2 INJECTION, SOLUTION INTRAVENOUS
Status: COMPLETED | OUTPATIENT
Start: 2017-06-01 | End: 2017-06-01

## 2017-06-01 RX ORDER — FENTANYL CITRATE 50 UG/ML
INJECTION, SOLUTION INTRAMUSCULAR; INTRAVENOUS PRN
Status: DISCONTINUED | OUTPATIENT
Start: 2017-06-01 | End: 2017-06-01

## 2017-06-01 RX ADMIN — PROPOFOL 200 MG: 10 INJECTION, EMULSION INTRAVENOUS at 13:00

## 2017-06-01 RX ADMIN — ROCURONIUM BROMIDE 40 MG: 10 INJECTION INTRAVENOUS at 13:00

## 2017-06-01 RX ADMIN — DEXAMETHASONE SODIUM PHOSPHATE 4 MG: 4 INJECTION, SOLUTION INTRA-ARTICULAR; INTRALESIONAL; INTRAMUSCULAR; INTRAVENOUS; SOFT TISSUE at 13:00

## 2017-06-01 RX ADMIN — CEFAZOLIN SODIUM 2 G: 2 INJECTION, SOLUTION INTRAVENOUS at 12:56

## 2017-06-01 RX ADMIN — SODIUM CHLORIDE, POTASSIUM CHLORIDE, SODIUM LACTATE AND CALCIUM CHLORIDE: 600; 310; 30; 20 INJECTION, SOLUTION INTRAVENOUS at 12:56

## 2017-06-01 RX ADMIN — HYDROMORPHONE HYDROCHLORIDE 1 MG: 1 INJECTION, SOLUTION INTRAMUSCULAR; INTRAVENOUS; SUBCUTANEOUS at 13:00

## 2017-06-01 RX ADMIN — Medication 3 MG: at 14:32

## 2017-06-01 RX ADMIN — SODIUM CHLORIDE, POTASSIUM CHLORIDE, SODIUM LACTATE AND CALCIUM CHLORIDE: 600; 310; 30; 20 INJECTION, SOLUTION INTRAVENOUS at 14:23

## 2017-06-01 RX ADMIN — GLYCOPYRROLATE 0.2 MG: 0.2 INJECTION, SOLUTION INTRAMUSCULAR; INTRAVENOUS at 13:00

## 2017-06-01 RX ADMIN — MIDAZOLAM HYDROCHLORIDE 2 MG: 1 INJECTION, SOLUTION INTRAMUSCULAR; INTRAVENOUS at 12:57

## 2017-06-01 RX ADMIN — PHENYLEPHRINE HYDROCHLORIDE 100 MCG: 10 INJECTION, SOLUTION INTRAMUSCULAR; INTRAVENOUS; SUBCUTANEOUS at 14:00

## 2017-06-01 RX ADMIN — GLYCOPYRROLATE 0.4 MG: 0.2 INJECTION, SOLUTION INTRAMUSCULAR; INTRAVENOUS at 14:32

## 2017-06-01 RX ADMIN — FENTANYL CITRATE 100 MCG: 50 INJECTION, SOLUTION INTRAMUSCULAR; INTRAVENOUS at 13:00

## 2017-06-01 RX ADMIN — ONDANSETRON 4 MG: 2 INJECTION INTRAMUSCULAR; INTRAVENOUS at 13:00

## 2017-06-01 RX ADMIN — PHENYLEPHRINE HYDROCHLORIDE 100 MCG: 10 INJECTION, SOLUTION INTRAMUSCULAR; INTRAVENOUS; SUBCUTANEOUS at 13:19

## 2017-06-01 RX ADMIN — KETOROLAC TROMETHAMINE 15 MG: 30 INJECTION, SOLUTION INTRAMUSCULAR at 13:00

## 2017-06-01 RX ADMIN — LIDOCAINE HYDROCHLORIDE 30 MG: 10 INJECTION, SOLUTION INFILTRATION; PERINEURAL at 13:00

## 2017-06-01 RX ADMIN — PHENYLEPHRINE HYDROCHLORIDE 100 MCG: 10 INJECTION, SOLUTION INTRAMUSCULAR; INTRAVENOUS; SUBCUTANEOUS at 13:11

## 2017-06-01 RX ADMIN — IPRATROPIUM BROMIDE AND ALBUTEROL SULFATE 3 ML: .5; 3 SOLUTION RESPIRATORY (INHALATION) at 12:24

## 2017-06-01 NOTE — BRIEF OP NOTE
[facility] Brief Operative Note    Pre-operative diagnosis: bilateral hernia   Post-operative diagnosis Bilateral recurrent inguinal hernias   Procedure: Procedure(s):  LAPAROSCOPIC HERNIORRHAPHY INGUINAL BILATERAL with mesh - Wound Class: I-Clean   Surgeon(s): Surgeon(s) and Role:     * Pola Barbosa MD - Primary     * Dwayne Fitch PA-C - Assisting   Estimated blood loss: * No values recorded between 6/1/2017  1:25 PM and 6/1/2017  2:32 PM *    Specimens: * No specimens in log *   Findings: Bilateral direct fat-containing defects, no indirect component.

## 2017-06-01 NOTE — ANESTHESIA PREPROCEDURE EVALUATION
Anesthesia Evaluation     . Pt has had prior anesthetic. Type: General    No history of anesthetic complications          ROS/MED HX    ENT/Pulmonary: Comment: resolving    (+), recent URI unresolved . .    Neurologic:  - neg neurologic ROS     Cardiovascular:     (+) hypertension----. : . . . :. .       METS/Exercise Tolerance:     Hematologic:  - neg hematologic  ROS       Musculoskeletal:   (+) , , other musculoskeletal- gout      GI/Hepatic:  - neg GI/hepatic ROS       Renal/Genitourinary:  - ROS Renal section negative       Endo:  - neg endo ROS       Psychiatric:  - neg psychiatric ROS       Infectious Disease:  - neg infectious disease ROS       Malignancy:      - no malignancy   Other:                     Physical Exam  Normal systems: cardiovascular, pulmonary and dental    Airway   Mallampati: II  TM distance: >3 FB  Neck ROM: full    Dental     Cardiovascular   Rhythm and rate: regular and normal      Pulmonary    breath sounds clear to auscultation    Other findings: Lab Test        05/18/17                       1244          WBC          7.5           HGB          12.9*         MCV          96            PLT          285            Lab Test        05/18/17                       1244          NA           144           POTASSIUM    4.7           CHLORIDE     107           CO2          25            BUN          7             CR           0.69          ANIONGAP     12            MARIXA          9.1           GLC          106*                   ECG  NSR                Anesthesia Plan      History & Physical Review      ASA Status:  2 .    NPO Status:  > 8 hours    Plan for General and ETT with Intravenous induction. Maintenance will be Balanced.    PONV prophylaxis:  Ondansetron (or other 5HT-3) and Dexamethasone or Solumedrol       Postoperative Care  Postoperative pain management:  IV analgesics, Oral pain medications and Multi-modal analgesia.      Consents  Anesthetic plan, risks, benefits and  alternatives discussed with:  Patient.  Use of blood products discussed: No .   .                          .

## 2017-06-01 NOTE — IP AVS SNAPSHOT
[facility] PreOP/PostOP    [address]    [facility] [address]    Phone:  [phone]    Fax:  [phone]                                       After Visit Summary   6/1/2017    Luis Yates    MRN: [id number]           After Visit Summary Signature Page     I have received my discharge instructions, and my questions have been answered. I have discussed any challenges I see with this plan with the nurse or doctor.    ..........................................................................................................................................  Patient/Patient Representative Signature      ..........................................................................................................................................  Patient Representative Print Name and Relationship to Patient    ..................................................               ................................................  Date                                            Time    ..........................................................................................................................................  Reviewed by Signature/Title    ...................................................              ..............................................  Date                                                            Time

## 2017-06-01 NOTE — ANESTHESIA CARE TRANSFER NOTE
Patient: Luis Yates    Procedure(s):  LAPAROSCOPIC HERNIORRHAPHY INGUINAL BILATERAL with mesh - Wound Class: I-Clean    Diagnosis: bilateral hernia  Diagnosis Additional Information: No value filed.    Anesthesia Type:   General, ETT     Note:  Airway :Face Mask  Patient transferred to:PACU  Comments: VSS.      Vitals: (Last set prior to Anesthesia Care Transfer)    CRNA VITALS  6/1/2017 1417 - 6/1/2017 1452      6/1/2017             Pulse: 83    SpO2: 100 %                Electronically Signed By: PAYAL Puente CRNA  June 1, 2017  2:52 PM

## 2017-06-01 NOTE — IP AVS SNAPSHOT
MRN:[id number]                      After Visit Summary   6/1/2017    Luis Yates    MRN: [id number]           Thank you!     Thank you for choosing [facility] for your care. Our goal is always to provide you with excellent care. Hearing back from our patients is one way we can continue to improve our services. Please take a few minutes to complete the written survey that you may receive in the mail after you visit. If you would like to speak to someone directly about your visit please contact Patient Relations at [phone]. Thank you!          Patient Information     Date Of Birth          1960        About your hospital stay     You were admitted on:  June 1, 2017 You last received care in the:  [facility] PreOP/PostOP    You were discharged on:  June 1, 2017       Who to Call     For medical emergencies, please call 911.  For non-urgent questions about your medical care, please call your primary care provider or clinic, [phone]  For questions related to your surgery, please call your surgery clinic        Attending Provider     Provider Pola Hill MD General Surgery       Primary Care Provider Office Phone # Fax #    Carola Manzano PAYAL [facility] [phone]      Your next 10 appointments already scheduled     Jun 14, 2017 10:45 AM CDT   XR JOINT INJECTION INTERM IRMA with [email or web address], [facility] [[email or web address]] IMAGING NURSE, [facility] [place] ([facility])    [address] [address] [address] [phone]           Stop drinking 1 hour before the exam.  You may take your medicines as usual, except for blood thinners (Coumadin, Plavix, Ticlid, Persantine, Aggrenox, Pletal, Effient, Brilliant). Talk to your doctor if you take these.  Tell your doctor if:   You have ever had an allergic reaction to X-ray dye (contrast fluid).   There is a chance you may be pregnant.  Please  bring a list of your current medicines to your exam. Include vitamins, minerals and over-the-counter medicines.  Please call the Imaging Department at your exam site with any questions.            Jun 16, 2017  8:40 AM CDT   SHORT with PAYAL Baker CNP   [facility] ([facility]    [address]  [facility] [address]   [phone]              Further instructions from your care team       HOME CARE FOLLOWING INGUINAL/FEMORAL HERNIA REPAIR  TREASURE Souza E. Gavin, N. Guttormson, D. Maurer, R. O Donnell, L. Thomas    DIET:  No restrictions.  Increased fluid intake is recommended. While taking pain medications, increase dietary fiber or add a fiber supplementation like Metamucil or Citrucel to help prevent constipation - a possible side effect of pain medications.    NAUSEA:  If nauseated from the anesthetic/pain meds; rest in bed, get up cautiously with assistance, and drink clear liquids (juice, tea, broth).    ACTIVITY:  Light Activity -- you may immediately be up and about as tolerated.  Driving -- you may drive when comfortable and off narcotic pain medications.  Light Work -- resume when comfortable off pain medications.  (If you can drive, you probably can work.)  Strenuous Work/Activity -- limit lifting to 20 pounds for 3 weeks.  Active Sports (running, biking, etc.) -- cautiously resume after 4 weeks.    INCISIONAL CARE:    If you have a dressing in place, keep clean and dry for 48 hours; you may replace the gauze if it becomes soiled.    After 48 hours you may remove the dressing and shower.  Do not submerse incision in water for 1 week.    If you have a Dermabond dressing (a type of skin glue), you may shower immediately.    Sutures will absorb and need not be removed.    If present, leave the steri-strips (white paper tapes) in place for 14 days after surgery.    If present, leave Dermabond glue in place until it wears/flakes  off.    Expect a variable amount of swelling/black and blue discoloration that may involve the penis/scrotum or labia.    Some numbness around the incision is common.    A lump/ridge under the incision is normal and will gradually resolve.    DISCOMFORT:  Local anesthetic placed at surgery should provide relief for 4-8 hours.  Begin taking pain pills before discomfort is severe.  Take the pain medication with some food, when possible, to minimize side effects.  Intermittent use of ice packs to the hernia repair site may help during the first 1-3 weeks after surgery.  Expect gradual improvement.    Over-the-counter anti-inflammatory medications (i.e. Ibuprofen/Advil/Motrin or Naprosyn/Aleve) may be used per package instructions in addition to or while tapering off the narcotic pain medications to decrease swelling and sensitivity at the repair site.  DO NOT TAKE these Anti-inflammatory medications if your primary physician has advised against doing so, or if you have acid reflux, ulcer, or bleeding disorder, or take blood-thinner medications.  Call your primary physician or the surgery office if you have medication questions.    RETURN APPOINTMENT:  Schedule a follow-up visit 2-3 weeks post-op.  Office Phone:  [phone]     CONTACT US IF THE FOLLOWING DEVELOPS:   1. A fever that is above 101     2. If there is a large amount of drainage, bleeding, or swelling.   3. Severe pain that is not relieved by your prescription.   4. Drainage that is thick, cloudy, yellow, green or white.   5. Any other questions not answered by  Frequently Asked Questions  sheet.      FREQUENTLY ASKED QUESTIONS:    Q:  How should my incision look?    A:  Normally your incision will appear slightly swollen with light redness directly along the incision itself as it heals.  It may feel like a bump or ridge as the healing/scarring happens, and over time (3-4 months) this bump or ridge feeling should slowly go away.  In general, clear or pink  watery drainage can be normal at first as your incision heals, but should decrease over time.    Q:  How do I know if my incision is infected?  A:  Look at your incision for signs of infection, like redness around the incision spreading to surrounding skin, or drainage of cloudy or foul-smelling drainage.  If you feel warm, check your temperature to see if you are running a fever.    **If any of these things occur, please notify the nurse at our office.  We may need you to come into the office for an incision check.      Q:  How do I take care of my incision?  A:  If you have a dressing in place - Starting the day after surgery, replace the dressing 1-2 times a day until there is no further drainage from the incision.  At that time, a dressing is no longer needed.  Try to minimize tape on the skin if irritation is occurring at the tape sites.  If you have significant irritation from tape on the skin, please call the office to discuss other method of dressing your incision.    Small pieces of tape called  steri-strips  may be present directly overlying your incision; these may be removed 10 days after surgery unless otherwise specified by your surgeon.  If these tapes start to loosen at the ends, you may trim them back until they fall off or are removed.    A:  If you had  Dermabond  tissue glue used as a dressing (this causes your incision to look shiny with a clear covering over it) - This type of dressing wears off with time and does not require more dressings over the top unless it is draining around the glue as it wears off.  Do not apply ointments or lotions over the incisions until the glue has completely worn off.    Q:  There is a piece of tape or a sticky  lead  still on my skin.  Can I remove this?  A:  Sometimes the sticky  leads  used for monitoring during surgery or for evaluation in the emergency department are not all removed while you are in the hospital.  These sometimes have a tab or metal dot on  them.  You can easily remove these on your own, like taking off a band-aid.  If there is a gel substance under the  lead , simply wipe/clean it off with a washcloth or paper towel.      Q:  What can I do to minimize constipation (very hard stools, or lack of stools)?  A:  Stay well hydrated.  Increase your dietary fiber intake or take a fiber supplement -with plenty of water.  Walk around frequently.  You may consider an over-the-counter stool-softener.  Your Pharmacist can assist you with choosing one that is stocked at your pharmacy.  Constipation is also one of the most common side effects of pain medication.  If you are using pain medication, be pro-active and try to PREVENT problems with constipation by taking the steps above BEFORE constipation becomes a problem.    Q:  What do I do if I need more pain medications?  A:  Call the office to receive refills.  Be aware that certain pain meds cannot be called into a pharmacy and actually require a paper prescription.  A change may be made in your pain med as you progress thru your recovery period or if you have side effects to certain meds.    --Pain meds are NOT refilled after 5pm on weekdays, and NOT AT ALL on the weekends, so please look ahead to prevent problems.      Q:  Why am I having a hard time sleeping now that I am at home?  A:  Many medications you receive while you are in the hospital can impact your sleep for a number of days after your surgery/hospitalization.  Decreased level of activity and naps during the day may also make sleeping at night difficult.  Try to minimize day-time naps, and get up frequently during the day to walk around your home during your recovery time.  Sleep aides may be of some help, but are not recommended for long-term use.      Q:  I am having some back discomfort.  What should I do?  A:  This may be related to certain positioning that was required for your surgery, extended periods of time in bed, or other changes in your  overall activity level.  You may try ice, heat, acetaminophen, or ibuprofen to treat this temporarily.  Note that many pain medications have acetaminophen in them and would state this on the prescription bottle.  Be sure not to exceed the maximum of 4000mg per day of acetaminophen.     **If the pain you are having does not resolve, is severe, or is a flare of back pain you have had on other occasions prior to surgery, please contact your primary physician for further recommendations or for an appointment to be examined at their office.    Q:  Why am I having headaches?  A:  Headaches can be caused by many things:  caffeine withdrawal, use of pain meds, dehydration, high blood pressure, lack of sleep, over-activity/exhaustion, flare-up of usual migraine headaches.  If you feel this is related to muscle tension (a band-like feeling around the head, or a pressure at the low-back of the head) you may try ice or heat to this area.  You may need to drink more fluids (try electrolyte drink like Gatorade), rest, or take your usual migraine medications.   **If your headaches do not resolve, worsen, are accompanied by other symptoms, or if your blood pressure is high, please call your primary physician for recommendation and/or examination.    Q:  I am unable to urinate.  What do I do?  A:  A small percentage of people can have difficulty urinating initially after surgery.  This includes being able to urinate only a very small amount at a time and feeling discomfort or pressure in the very low abdomen.  This is called  urinary retention , and is actually an urgent situation.  Proceed to your nearest Emergency department for evaluation (not an Urgent Care Center).  Sometimes the bladder does not work correctly after certain medications you receive during surgery, or related to certain procedures.  You may need to have a catheter placed until your bladder recovers.  When planning to go to an Emergency department, it may help to  call the ER to let them know you are coming in for this problem after a surgery.  This may help you get in quicker to be evaluated.  **If you have symptoms of a urinary tract infection, please contact your primary physician for the proper evaluation and treatment.          If you have other questions, please call the office Monday thru Friday between 8am and 5pm to discuss with the nurse or physician assistant.  #[phone]    There is a surgeon ON CALL on weekday evenings and over the weekend in case of urgent need only, and may be contacted at the same number.    If you are having an emergency, call 911 or proceed to your nearest emergency department.    GENERAL ANESTHESIA OR SEDATION ADULT DISCHARGE INSTRUCTIONS   SPECIAL PRECAUTIONS FOR 24 HOURS AFTER SURGERY    IT IS NOT UNUSUAL TO FEEL LIGHT-HEADED OR FAINT, UP TO 24 HOURS AFTER SURGERY OR WHILE TAKING PAIN MEDICATION.  IF YOU HAVE THESE SYMPTOMS; SIT FOR A FEW MINUTES BEFORE STANDING AND HAVE SOMEONE ASSIST YOU WHEN YOU GET UP TO WALK OR USE THE BATHROOM.    YOU SHOULD REST AND RELAX FOR THE NEXT 24 HOURS AND YOU MUST MAKE ARRANGEMENTS TO HAVE SOMEONE STAY WITH YOU FOR AT LEAST 24 HOURS AFTER YOUR DISCHARGE.  AVOID HAZARDOUS AND STRENUOUS ACTIVITIES.  DO NOT MAKE IMPORTANT DECISIONS FOR 24 HOURS.    DO NOT DRIVE ANY VEHICLE OR OPERATE MECHANICAL EQUIPMENT FOR 24 HOURS FOLLOWING THE END OF YOUR SURGERY.  EVEN THOUGH YOU MAY FEEL NORMAL, YOUR REACTIONS MAY BE AFFECTED BY THE MEDICATION YOU HAVE RECEIVED.    DO NOT DRINK ALCOHOLIC BEVERAGES FOR 24 HOURS FOLLOWING YOUR SURGERY.    DRINK CLEAR LIQUIDS (APPLE JUICE, GINGER ALE, 7-UP, BROTH, ETC.).  PROGRESS TO YOUR REGULAR DIET AS YOU FEEL ABLE.    YOU MAY HAVE A DRY MOUTH, A SORE THROAT, MUSCLES ACHES OR TROUBLE SLEEPING.  THESE SHOULD GO AWAY AFTER 24 HOURS.    CALL YOUR DOCTOR FOR ANY OF THE FOLLOWING:  SIGNS OF INFECTION (FEVER, GROWING TENDERNESS AT THE SURGERY SITE, A LARGE AMOUNT OF DRAINAGE OR BLEEDING,  "SEVERE PAIN, FOUL-SMELLING DRAINAGE, REDNESS OR SWELLING.    IT HAS BEEN OVER 8 TO 10 HOURS SINCE SURGERY AND YOU ARE STILL NOT ABLE TO URINATE (PASS WATER).     You received Toradol, an IV form of ibuprofen (Motrin) at 1300.  Do not take any ibuprofen products until 7:00 PM.        Pending Results     No orders found from 2017 to 2017.            Admission Information     Date & Time Provider Department Dept. Phone    2017 Pola Barbosa MD [facility] PreOP/PostOP [phone]      Your Vitals Were     Blood Pressure Temperature Respirations Height Weight Pulse Oximetry    149/81 97.7  F (36.5  C) (Temporal) 14 1.676 m (5' 6\") 70.3 kg (155 lb) 96%    BMI (Body Mass Index)                   25.02 kg/m2           MyChart Information     [employer] lets you send messages to your doctor, view your test results, renew your prescriptions, schedule appointments and more. To sign up, go to [email or web address] . Click on \"Log in\" on the left side of the screen, which will take you to the Welcome page. Then click on \"Sign up Now\" on the right side of the page.     You will be asked to enter the access code listed below, as well as some personal information. Please follow the directions to create your username and password.     Your access code is: [email or web address]  Expires: 2017  9:27 AM     Your access code will  in 90 days. If you need help or a new code, please call your [place] clinic or [phone].        Care EveryWhere ID     This is your Care EveryWhere ID. This could be used by other organizations to access your [place] medical records  [id number]           Review of your medicines      START taking        Dose / Directions    HYDROcodone-acetaminophen 5-325 MG per tablet   Commonly known as:  NORCO   Used for:  Bilateral recurrent inguinal hernia without obstruction or gangrene        Dose:  1-2 tablet   Take 1-2 tablets by mouth every 4 hours as needed for other (Moderate " to Severe Pain)   Quantity:  30 tablet   Refills:  0         CONTINUE these medicines which have NOT CHANGED        Dose / Directions    * albuterol 108 (90 BASE) MCG/ACT Inhaler   Commonly known as:  PROAIR HFA/PROVENTIL HFA/VENTOLIN HFA        Dose:  2 puff   Inhale 2 puffs into the lungs every 6 hours   Refills:  0       * albuterol (2.5 MG/3ML) 0.083% neb solution   Used for:  Acute bronchospasm, Acute bronchitis with symptoms > 10 days        Dose:  1 vial   Take 1 vial (2.5 mg) by nebulization every 6 hours as needed for shortness of breath / dyspnea or wheezing   Quantity:  50 vial   Refills:  1       atenolol 50 MG tablet   Commonly known as:  TENORMIN   Used for:  Essential hypertension        Dose:  100 mg   Take 2 tablets (100 mg) by mouth daily   Quantity:  180 tablet   Refills:  1       cefdinir 300 MG capsule   Commonly known as:  OMNICEF   Used for:  Acute bronchospasm, Acute bronchitis with symptoms > 10 days        Dose:  300 mg   Take 1 capsule (300 mg) by mouth 2 times daily   Quantity:  20 capsule   Refills:  0       COLCRYS PO        Dose:  0.6 mg   Take 0.6 mg by mouth daily   Refills:  0       LOSARTAN POTASSIUM PO        Dose:  25 mg   Take 25 mg by mouth daily   Refills:  0       order for DME   Used for:  Acute bronchospasm, Acute bronchitis with symptoms > 10 days        Neb machine and tubing   Quantity:  1 Device   Refills:  prn       * Notice:  This list has 2 medication(s) that are the same as other medications prescribed for you. Read the directions carefully, and ask your doctor or other care provider to review them with you.         Where to get your medicines      Some of these will need a paper prescription and others can be bought over the counter. Ask your nurse if you have questions.     Bring a paper prescription for each of these medications     HYDROcodone-acetaminophen 5-325 MG per tablet                Protect others around you: Learn how to safely use, store and throw  away your medicines at www.disposemymeds.org.             Medication List: This is a list of all your medications and when to take them. Check marks below indicate your daily home schedule. Keep this list as a reference.      Medications           Morning Afternoon Evening Bedtime As Needed    * albuterol 108 (90 BASE) MCG/ACT Inhaler   Commonly known as:  PROAIR HFA/PROVENTIL HFA/VENTOLIN HFA   Inhale 2 puffs into the lungs every 6 hours                                * albuterol (2.5 MG/3ML) 0.083% neb solution   Take 1 vial (2.5 mg) by nebulization every 6 hours as needed for shortness of breath / dyspnea or wheezing                                atenolol 50 MG tablet   Commonly known as:  TENORMIN   Take 2 tablets (100 mg) by mouth daily                                cefdinir 300 MG capsule   Commonly known as:  OMNICEF   Take 1 capsule (300 mg) by mouth 2 times daily                                COLCRYS PO   Take 0.6 mg by mouth daily                                HYDROcodone-acetaminophen 5-325 MG per tablet   Commonly known as:  NORCO   Take 1-2 tablets by mouth every 4 hours as needed for other (Moderate to Severe Pain)                                LOSARTAN POTASSIUM PO   Take 25 mg by mouth daily                                order for DME   Neb machine and tubing                                * Notice:  This list has 2 medication(s) that are the same as other medications prescribed for you. Read the directions carefully, and ask your doctor or other care provider to review them with you.

## 2017-06-01 NOTE — DISCHARGE INSTRUCTIONS
HOME CARE FOLLOWING INGUINAL/FEMORAL HERNIA REPAIR  TREASURE Souza E. Gavin, N. Guttormson, D. Maurer, STEPHANIA Stone    DIET:  No restrictions.  Increased fluid intake is recommended. While taking pain medications, increase dietary fiber or add a fiber supplementation like Metamucil or Citrucel to help prevent constipation - a possible side effect of pain medications.    NAUSEA:  If nauseated from the anesthetic/pain meds; rest in bed, get up cautiously with assistance, and drink clear liquids (juice, tea, broth).    ACTIVITY:  Light Activity -- you may immediately be up and about as tolerated.  Driving -- you may drive when comfortable and off narcotic pain medications.  Light Work -- resume when comfortable off pain medications.  (If you can drive, you probably can work.)  Strenuous Work/Activity -- limit lifting to 20 pounds for 3 weeks.  Active Sports (running, biking, etc.) -- cautiously resume after 4 weeks.    INCISIONAL CARE:    If you have a dressing in place, keep clean and dry for 48 hours; you may replace the gauze if it becomes soiled.    After 48 hours you may remove the dressing and shower.  Do not submerse incision in water for 1 week.    If you have a Dermabond dressing (a type of skin glue), you may shower immediately.    Sutures will absorb and need not be removed.    If present, leave the steri-strips (white paper tapes) in place for 14 days after surgery.    If present, leave Dermabond glue in place until it wears/flakes off.    Expect a variable amount of swelling/black and blue discoloration that may involve the penis/scrotum or labia.    Some numbness around the incision is common.    A lump/ridge under the incision is normal and will gradually resolve.    DISCOMFORT:  Local anesthetic placed at surgery should provide relief for 4-8 hours.  Begin taking pain pills before discomfort is severe.  Take the pain medication with some food, when possible, to minimize side  effects.  Intermittent use of ice packs to the hernia repair site may help during the first 1-3 weeks after surgery.  Expect gradual improvement.    Over-the-counter anti-inflammatory medications (i.e. Ibuprofen/Advil/Motrin or Naprosyn/Aleve) may be used per package instructions in addition to or while tapering off the narcotic pain medications to decrease swelling and sensitivity at the repair site.  DO NOT TAKE these Anti-inflammatory medications if your primary physician has advised against doing so, or if you have acid reflux, ulcer, or bleeding disorder, or take blood-thinner medications.  Call your primary physician or the surgery office if you have medication questions.    RETURN APPOINTMENT:  Schedule a follow-up visit 2-3 weeks post-op.  Office Phone:  [phone]     CONTACT US IF THE FOLLOWING DEVELOPS:   1. A fever that is above 101     2. If there is a large amount of drainage, bleeding, or swelling.   3. Severe pain that is not relieved by your prescription.   4. Drainage that is thick, cloudy, yellow, green or white.   5. Any other questions not answered by  Frequently Asked Questions  sheet.      FREQUENTLY ASKED QUESTIONS:    Q:  How should my incision look?    A:  Normally your incision will appear slightly swollen with light redness directly along the incision itself as it heals.  It may feel like a bump or ridge as the healing/scarring happens, and over time (3-4 months) this bump or ridge feeling should slowly go away.  In general, clear or pink watery drainage can be normal at first as your incision heals, but should decrease over time.    Q:  How do I know if my incision is infected?  A:  Look at your incision for signs of infection, like redness around the incision spreading to surrounding skin, or drainage of cloudy or foul-smelling drainage.  If you feel warm, check your temperature to see if you are running a fever.    **If any of these things occur, please notify the nurse at our  office.  We may need you to come into the office for an incision check.      Q:  How do I take care of my incision?  A:  If you have a dressing in place - Starting the day after surgery, replace the dressing 1-2 times a day until there is no further drainage from the incision.  At that time, a dressing is no longer needed.  Try to minimize tape on the skin if irritation is occurring at the tape sites.  If you have significant irritation from tape on the skin, please call the office to discuss other method of dressing your incision.    Small pieces of tape called  steri-strips  may be present directly overlying your incision; these may be removed 10 days after surgery unless otherwise specified by your surgeon.  If these tapes start to loosen at the ends, you may trim them back until they fall off or are removed.    A:  If you had  Dermabond  tissue glue used as a dressing (this causes your incision to look shiny with a clear covering over it) - This type of dressing wears off with time and does not require more dressings over the top unless it is draining around the glue as it wears off.  Do not apply ointments or lotions over the incisions until the glue has completely worn off.    Q:  There is a piece of tape or a sticky  lead  still on my skin.  Can I remove this?  A:  Sometimes the sticky  leads  used for monitoring during surgery or for evaluation in the emergency department are not all removed while you are in the hospital.  These sometimes have a tab or metal dot on them.  You can easily remove these on your own, like taking off a band-aid.  If there is a gel substance under the  lead , simply wipe/clean it off with a washcloth or paper towel.      Q:  What can I do to minimize constipation (very hard stools, or lack of stools)?  A:  Stay well hydrated.  Increase your dietary fiber intake or take a fiber supplement -with plenty of water.  Walk around frequently.  You may consider an over-the-counter  stool-softener.  Your Pharmacist can assist you with choosing one that is stocked at your pharmacy.  Constipation is also one of the most common side effects of pain medication.  If you are using pain medication, be pro-active and try to PREVENT problems with constipation by taking the steps above BEFORE constipation becomes a problem.    Q:  What do I do if I need more pain medications?  A:  Call the office to receive refills.  Be aware that certain pain meds cannot be called into a pharmacy and actually require a paper prescription.  A change may be made in your pain med as you progress thru your recovery period or if you have side effects to certain meds.    --Pain meds are NOT refilled after 5pm on weekdays, and NOT AT ALL on the weekends, so please look ahead to prevent problems.      Q:  Why am I having a hard time sleeping now that I am at home?  A:  Many medications you receive while you are in the hospital can impact your sleep for a number of days after your surgery/hospitalization.  Decreased level of activity and naps during the day may also make sleeping at night difficult.  Try to minimize day-time naps, and get up frequently during the day to walk around your home during your recovery time.  Sleep aides may be of some help, but are not recommended for long-term use.      Q:  I am having some back discomfort.  What should I do?  A:  This may be related to certain positioning that was required for your surgery, extended periods of time in bed, or other changes in your overall activity level.  You may try ice, heat, acetaminophen, or ibuprofen to treat this temporarily.  Note that many pain medications have acetaminophen in them and would state this on the prescription bottle.  Be sure not to exceed the maximum of 4000mg per day of acetaminophen.     **If the pain you are having does not resolve, is severe, or is a flare of back pain you have had on other occasions prior to surgery, please contact your  primary physician for further recommendations or for an appointment to be examined at their office.    Q:  Why am I having headaches?  A:  Headaches can be caused by many things:  caffeine withdrawal, use of pain meds, dehydration, high blood pressure, lack of sleep, over-activity/exhaustion, flare-up of usual migraine headaches.  If you feel this is related to muscle tension (a band-like feeling around the head, or a pressure at the low-back of the head) you may try ice or heat to this area.  You may need to drink more fluids (try electrolyte drink like Gatorade), rest, or take your usual migraine medications.   **If your headaches do not resolve, worsen, are accompanied by other symptoms, or if your blood pressure is high, please call your primary physician for recommendation and/or examination.    Q:  I am unable to urinate.  What do I do?  A:  A small percentage of people can have difficulty urinating initially after surgery.  This includes being able to urinate only a very small amount at a time and feeling discomfort or pressure in the very low abdomen.  This is called  urinary retention , and is actually an urgent situation.  Proceed to your nearest Emergency department for evaluation (not an Urgent Care Center).  Sometimes the bladder does not work correctly after certain medications you receive during surgery, or related to certain procedures.  You may need to have a catheter placed until your bladder recovers.  When planning to go to an Emergency department, it may help to call the ER to let them know you are coming in for this problem after a surgery.  This may help you get in quicker to be evaluated.  **If you have symptoms of a urinary tract infection, please contact your primary physician for the proper evaluation and treatment.          If you have other questions, please call the office Monday thru Friday between 8am and 5pm to discuss with the nurse or physician assistant.  #[phone]    There  is a surgeon ON CALL on weekday evenings and over the weekend in case of urgent need only, and may be contacted at the same number.    If you are having an emergency, call 911 or proceed to your nearest emergency department.    GENERAL ANESTHESIA OR SEDATION ADULT DISCHARGE INSTRUCTIONS   SPECIAL PRECAUTIONS FOR 24 HOURS AFTER SURGERY    IT IS NOT UNUSUAL TO FEEL LIGHT-HEADED OR FAINT, UP TO 24 HOURS AFTER SURGERY OR WHILE TAKING PAIN MEDICATION.  IF YOU HAVE THESE SYMPTOMS; SIT FOR A FEW MINUTES BEFORE STANDING AND HAVE SOMEONE ASSIST YOU WHEN YOU GET UP TO WALK OR USE THE BATHROOM.    YOU SHOULD REST AND RELAX FOR THE NEXT 24 HOURS AND YOU MUST MAKE ARRANGEMENTS TO HAVE SOMEONE STAY WITH YOU FOR AT LEAST 24 HOURS AFTER YOUR DISCHARGE.  AVOID HAZARDOUS AND STRENUOUS ACTIVITIES.  DO NOT MAKE IMPORTANT DECISIONS FOR 24 HOURS.    DO NOT DRIVE ANY VEHICLE OR OPERATE MECHANICAL EQUIPMENT FOR 24 HOURS FOLLOWING THE END OF YOUR SURGERY.  EVEN THOUGH YOU MAY FEEL NORMAL, YOUR REACTIONS MAY BE AFFECTED BY THE MEDICATION YOU HAVE RECEIVED.    DO NOT DRINK ALCOHOLIC BEVERAGES FOR 24 HOURS FOLLOWING YOUR SURGERY.    DRINK CLEAR LIQUIDS (APPLE JUICE, GINGER ALE, 7-UP, BROTH, ETC.).  PROGRESS TO YOUR REGULAR DIET AS YOU FEEL ABLE.    YOU MAY HAVE A DRY MOUTH, A SORE THROAT, MUSCLES ACHES OR TROUBLE SLEEPING.  THESE SHOULD GO AWAY AFTER 24 HOURS.    CALL YOUR DOCTOR FOR ANY OF THE FOLLOWING:  SIGNS OF INFECTION (FEVER, GROWING TENDERNESS AT THE SURGERY SITE, A LARGE AMOUNT OF DRAINAGE OR BLEEDING, SEVERE PAIN, FOUL-SMELLING DRAINAGE, REDNESS OR SWELLING.    IT HAS BEEN OVER 8 TO 10 HOURS SINCE SURGERY AND YOU ARE STILL NOT ABLE TO URINATE (PASS WATER).     You received Toradol, an IV form of ibuprofen (Motrin) at 1300.  Do not take any ibuprofen products until 7:00 PM.

## 2017-06-01 NOTE — OR NURSING
Up to BR attempt to void, was unable. Bladder was scanned at 1730 72 ml. Patient is sipping water. Will continue to monitor.

## 2017-06-01 NOTE — ANESTHESIA POSTPROCEDURE EVALUATION
Patient: Luis Yates    Procedure(s):  LAPAROSCOPIC HERNIORRHAPHY INGUINAL BILATERAL with mesh - Wound Class: I-Clean    Diagnosis:bilateral hernia  Diagnosis Additional Information: Pre-operative diagnosis: bilateral hernia  Post-operative diagnosis Bilateral recurrent inguinal hernias  Procedure: Procedure(s):  LAPAROSCOPIC HERNIORRHAPHY INGUINAL BILATERAL with mesh - Wound Class: I-Clean  Surgeon(s): Surgeon(s) and Role:     *,  Pola Barbosa MD - Primary     * Dwayne Fitch PA-C - Assisting  Estimated blood loss: * No values recorded between 6/1/2017  1:25 PM and 6/1/2017  2:32 PM *                   Specimens: * No specimens in log *  Findings: Anibal, ateral direct fat-containing defects, no indirect component.                   Anesthesia Type:  General, ETT    Note:  Anesthesia Post Evaluation    Patient location during evaluation: PACU  Patient participation: Able to fully participate in evaluation  Level of consciousness: awake  Pain management: adequate  Airway patency: patent  Cardiovascular status: acceptable  Respiratory status: acceptable  Hydration status: acceptable  PONV: none     Anesthetic complications: None          Last vitals:  Vitals:    06/01/17 1455 06/01/17 1500 06/01/17 1517   BP: 116/75 132/66 140/72   Resp: 16 12 15   Temp:      SpO2: 100% 99% 100%         Electronically Signed By: Elio Crane MD  June 1, 2017  3:30 PM

## 2017-06-02 NOTE — OP NOTE
DATE OF PROCEDURE: 06/01/2017       PREOPERATIVE DIAGNOSIS:  Bilateral recurrent inguinal hernias.      POSTOPERATIVE DIAGNOSIS:  Bilateral recurrent inguinal hernias.      PROCEDURE:  Laparoscopic preperitoneal repair of bilateral inguinal hernias with placement of mesh.      ANESTHESIA:  General plus local.      SURGEON:  Pola Gray MD      ASSISTANT:  Dwayne Fitch PA-C, whose expertise in camera management, exposure, suturing and identification of critical structures was critical to the surgery.      SPECIMENS:  None submitted.      COMPLICATIONS:  None.      INDICATIONS:  Mr. Luis Yates is a 56-year-old gentleman who had previous inguinal hernias repaired without mesh in [facility] almost a decade ago.  He had a clinical recurrence on both sides, was having discomfort with activity.  Because of his symptoms and the fact that the hernias were both bilateral and recurrent, laparoscopy was recommended to the patient.  Discussions via  were entertained, risks of the operation were discussed in detail, including infection, bleeding, harm to adjacent structures, cord damage, testicular loss, hernia recurrence and need to convert to an anterior approach.  The patient verbalized understanding of the above and consented to proceed.      FINDINGS:  There were moderate-sized direct defects containing fat on both sides.  There was no indirect component on either side.  These were repaired with ProGrip mesh in an underlay fashion.      DESCRIPTION OF PROCEDURE:  With the patient under excellent general anesthesia in a supine position, Herman catheter was placed to decompress the bladder, the low abdomen was subsequently prepped and draped in the usual sterile surgical fashion.  Timeout was performed, confirming the patient, procedure to be done as well as site markings and drug allergies.  The patient did receive a dose of Ancef prior to incision for infectious prophylaxis.  We began by marking  the midline between the umbilicus and pubic symphysis.  A transverse incision was made just inferior to the umbilicus and electrocautery dissection was carried out to the anterior rectus sheath off to the patient's left.  Sheath was incised longitudinally for several centimeters and apical stitches were placed.  The rectus muscle was swept laterally to expose the posterior sheath.  Spacemaker balloon dissector was then threaded down through the defect and deep to the pubis.  Preperitoneal space was developed bluntly by manual insufflation of the dissecting balloon under direct vision.  The balloon trocar was advanced over the dissector.  The dissector was desufflated and brought through the balloon trocar.  Gentle insufflation was applied to the preperitoneal space, and 2 further 5 mm ports were placed along the midline under direct vision.        We turned our attention to the right groin initially.  We developed the preperitoneal space out just lateral to the cord slightly, although the balloon did a sufficient job in this dissection for the most part.  We saw a tongue of fat emanating up towards the direct space.  This fat was peeled away from the pseudo sac and reduced into the preperitoneal space.  The peritoneal reflection was identified along the cord; it did not enter the internal ring, i.e., there was no indirect component.  This was swept away from the cord for several centimeters away from the ring as well.        We turned our attention to the left groin.  We identified the hernia contents emanating again towards the direct space.  There was much more fibrosis on this side versus the previous side.  This was taken down with more judicious use of electrocautery.  Once reduced entirely, we examined again the peritoneal reflection, which was found to be several centimeters away from the internal ring.  We introduced ProGrip mesh into the field, soaked it in IrriSept and rinsed gently.  This was trimmed  slightly, taking a triangle out from the lateral inferior aspect of the mesh.  The mesh was curled up tightly, placed through the balloon trocar and unrolled such that the medial aspect was at about the pubic symphysis and the internal ring was centered in the mesh.  We introduced a second piece of mesh into the field, also soaked IrriSept and rinsed, and performed an identical maneuver in regards to mesh placement on the right as on the left side.  We then slowly released the gas insufflation from the preperitoneal space and saw that the normal anatomy reapproximated itself without curling or distorting the mesh.  The lower ports removed then as was the balloon trocar.  The anterior rectus sheath defect was closed with 0 Vicryl stitches in a figure-of-eight fashion x2.  A total of 30 mL of 0.5% Marcaine were instilled in all the incisions.  Skin was closed with 4-0 Vicryls in a deep dermal interrupted fashion and skin glue was applied atop of this.  Herman catheter was removed.  The patient was extubated and brought to recovery in excellent condition.  All sharps and sponge counts were correct at the conclusion of the case.         SKYLAR RICE MD             D: 2017 14:42   T: 2017 11:08   MT: [id number]      Name:     KATELYN LINARES   MRN:      [id number]        Account:        [id number]   :      1960           Procedure Date: 2017      Document: [id number]       cc: Carola Grullon NP

## 2017-06-02 NOTE — OR NURSING
On-call physician, Dr. Ryder, notified of patient's urinary status.  Order received to let the patient discharge to home without urinating with the understanding that if patient unable to void before 2 am the family needs to bring the patient back into the ED.

## 2017-06-08 DIAGNOSIS — M79.672 PAIN IN BOTH FEET: Primary | ICD-10-CM

## 2017-06-08 DIAGNOSIS — M79.671 PAIN IN BOTH FEET: Primary | ICD-10-CM

## 2017-06-09 ENCOUNTER — TELEPHONE (OUTPATIENT)
Dept: SURGERY | Facility: CLINIC | Age: 57
End: 2017-06-09

## 2017-06-09 NOTE — TELEPHONE ENCOUNTER
GENERAL SURGERY NURSE PHONE TRIAGE     Luis Yates      MRN# [id number]  AGE:  56 year old     YOB: 1960  [phone] (home)   Surgeon: Dr. Gray  Surgical Assist:  Dwayne Fitch PA-C     Surgery type: Laparoscopic inguinal Herniorrhaphy with Mesh     Surgery Date: June / 01 / 2017     POD: 8     CHIEF CONCERN: 1. Painful urination  2. Swelling at incision site     HISTORY OF PRESENT ILLNESS:   Patients son in law Sampson called- chart reviewed, consent to communicate with Vicente is signed.    1. Painful urination-  Urine described as sometimes clear, red or dark brown.  No odor.    2. Swelling at left incision side- groin area.    Swelling progressively increases during the day, and is less in am upon awakening.    Afebrile, no nausea or vomiting.    Pain at incision site is described as a 4 on one to ten scale.  Is taking Norco sporadically.  No refill needed at this time.  Discussed with clinic [facility]      PLAN:   Plan:    Call PCP re issues with urination.  In the meantime, make sure to drink fluids hourly.    Start Ibuprofen.  Encourage patient to lay supine every two hours, may apply cold packs to swollen areas.      Luis Yates  is recommended to contact the clinic if worsening pain, onset of fever/redness at any incision site, or new drainage from the area. Pt also recommended to call office at any time if ongoing questions/concerns during recovery. Pt son in law verbalized agreement with this plan.  Carola Napoles RN

## 2017-06-14 ENCOUNTER — HOSPITAL ENCOUNTER (OUTPATIENT)
Dept: GENERAL RADIOLOGY | Facility: CLINIC | Age: 57
Discharge: HOME | End: 2017-06-14
Attending: PODIATRIST | Admitting: PODIATRIST
Payer: COMMERCIAL

## 2017-06-14 VITALS — SYSTOLIC BLOOD PRESSURE: 165 MMHG | DIASTOLIC BLOOD PRESSURE: 76 MMHG | HEART RATE: 72 BPM

## 2017-06-14 DIAGNOSIS — M20.5X9 HALLUX LIMITUS, UNSPECIFIED LATERALITY: ICD-10-CM

## 2017-06-14 PROCEDURE — T1013 SIGN LANG/ORAL INTERPRETER: HCPCS | Mod: U3

## 2017-06-14 PROCEDURE — S0020 INJECTION, BUPIVICAINE HYDRO: HCPCS

## 2017-06-14 PROCEDURE — 27211110 XR JOINT INJECTION INTERMED BILATERAL

## 2017-06-14 PROCEDURE — 20605 DRAIN/INJ JOINT/BURSA W/O US: CPT | Mod: 50

## 2017-06-14 PROCEDURE — 25000125 ZZHC RX 250

## 2017-06-14 PROCEDURE — 25500064 ZZH RX 255 OP 636: Performed by: PHYSICIAN ASSISTANT

## 2017-06-14 RX ORDER — BUPIVACAINE HYDROCHLORIDE 5 MG/ML
4 INJECTION, SOLUTION EPIDURAL; INTRACAUDAL ONCE
Status: COMPLETED | OUTPATIENT
Start: 2017-06-14 | End: 2017-06-14

## 2017-06-14 RX ORDER — LIDOCAINE HYDROCHLORIDE 10 MG/ML
INJECTION, SOLUTION INFILTRATION; PERINEURAL
Status: COMPLETED
Start: 2017-06-14 | End: 2017-06-14

## 2017-06-14 RX ORDER — IOPAMIDOL 408 MG/ML
10 INJECTION, SOLUTION INTRATHECAL ONCE
Status: COMPLETED | OUTPATIENT
Start: 2017-06-14 | End: 2017-06-14

## 2017-06-14 RX ORDER — LIDOCAINE HYDROCHLORIDE 10 MG/ML
5 INJECTION, SOLUTION EPIDURAL; INFILTRATION; INTRACAUDAL; PERINEURAL ONCE
Status: COMPLETED | OUTPATIENT
Start: 2017-06-14 | End: 2017-06-14

## 2017-06-14 RX ORDER — TRIAMCINOLONE ACETONIDE 40 MG/ML
80 INJECTION, SUSPENSION INTRA-ARTICULAR; INTRAMUSCULAR ONCE
Status: COMPLETED | OUTPATIENT
Start: 2017-06-14 | End: 2017-06-14

## 2017-06-14 RX ORDER — BUPIVACAINE HYDROCHLORIDE 5 MG/ML
INJECTION, SOLUTION EPIDURAL; INTRACAUDAL
Status: COMPLETED
Start: 2017-06-14 | End: 2017-06-14

## 2017-06-14 RX ORDER — TRIAMCINOLONE ACETONIDE 40 MG/ML
INJECTION, SUSPENSION INTRA-ARTICULAR; INTRAMUSCULAR
Status: COMPLETED
Start: 2017-06-14 | End: 2017-06-14

## 2017-06-14 RX ADMIN — TRIAMCINOLONE ACETONIDE 40 MG: 40 INJECTION, SUSPENSION INTRA-ARTICULAR; INTRAMUSCULAR at 11:42

## 2017-06-14 RX ADMIN — BUPIVACAINE HYDROCHLORIDE 20 MG: 5 INJECTION, SOLUTION EPIDURAL; INTRACAUDAL at 11:42

## 2017-06-14 RX ADMIN — IOPAMIDOL 1 ML: 408 INJECTION, SOLUTION INTRATHECAL at 11:41

## 2017-06-14 RX ADMIN — LIDOCAINE HYDROCHLORIDE 2 ML: 10 INJECTION, SOLUTION INFILTRATION; PERINEURAL at 11:42

## 2017-06-14 RX ADMIN — BUPIVACAINE HYDROCHLORIDE 20 MG: 5 INJECTION, SOLUTION EPIDURAL; INTRACAUDAL; PERINEURAL at 11:42

## 2017-06-14 RX ADMIN — LIDOCAINE HYDROCHLORIDE 2 ML: 10 INJECTION, SOLUTION EPIDURAL; INFILTRATION; INTRACAUDAL; PERINEURAL at 11:42

## 2017-06-14 NOTE — PROGRESS NOTES
Pt tolerated ortho procedure well, post procedure pain level improved to a 1. There were no complications during procedure. Pt verbalized understanding of written and verbal instructions and left department in stable and satisfactory condition with daughter. There is no evidence of bleeding upon discharge.

## 2017-06-14 NOTE — PROGRESS NOTES
RADIOLOGY PROCEDURE NOTE  Patient name: Luis Yates  MRN: [id number]  : 1960    Pre-procedure diagnosis: Bilateral great toe pain  Post-procedure diagnosis: Same    Procedure Date/Time: 2017  11:32 AM  Procedure: Bilateral MTP joint steroid injections  Estimated blood loss: None  Specimen(s) collected with description: none  The patient tolerated the procedure well with no immediate complications.  Significant findings:none    See imaging dictation for procedural details.    Provider name: Fantasma Garcia  Assistant(s):None

## 2017-06-15 ENCOUNTER — OFFICE VISIT (OUTPATIENT)
Dept: SURGERY | Facility: CLINIC | Age: 57
End: 2017-06-15
Payer: COMMERCIAL

## 2017-06-15 VITALS — SYSTOLIC BLOOD PRESSURE: 154 MMHG | DIASTOLIC BLOOD PRESSURE: 80 MMHG | HEART RATE: 91 BPM | OXYGEN SATURATION: 97 %

## 2017-06-15 DIAGNOSIS — Z09 SURGICAL FOLLOWUP VISIT: Primary | ICD-10-CM

## 2017-06-15 PROCEDURE — 99024 POSTOP FOLLOW-UP VISIT: CPT | Performed by: PHYSICIAN ASSISTANT

## 2017-06-15 NOTE — PROGRESS NOTES
[facility] Note 6/15/2017    Subjective:  *Seen with daughter and   Luis Yates is here for his first postoperative visit.  He underwent Laparoscopic repair of recurrent Bilateral Inguinal hernias (both were direct defects) with ProGrip Mesh by Dr. Gray.  He is now 2 weeks postop, and feels his recovery has been progressing nicely.  Rx/OTC pain medication has been used appropriately for pain control.  Postop recovery complications: he reports a small tender lump at left groin.    Today he has minimal discomfort at the incisions with activities, tolerating a regular diet, and having normal bowel activity.  Current pain management: none.  Following restrictions outlined by surgeon.    Objective:  Abd - soft, non-tender, non-distended  Right groin - no hernia, no seroma, nontender  Left groin - no hernia, small tender seroma noted  Laparoscopic incisions - skin glue removed, healing well, no erythema/bruising, +normal healing ridge, no seroma/hematoma noted, no hernia noted    Assessment:  -S/p Laparoscopic repair of recurrent Bilateral Inguinal hernias (both were direct defects) with ProGrip Mesh  -small tender seroma at left groin, his recurrent hernia was reduced from this location at the time of surgery    Plan:  Luis will monitor the seroma at the left groin for resolution over the following 1-2 months.  If he has continued lump or pain after 2 months, he will contact the surgery office for recheck of the site.    He may continue to slowly advance his activities at this time.  General recommendation is to remain at a 20 lb weight restriction until 4 weeks after surgery.  After that time, he may increase weight restriction by 10 lbs per week.  He may continue to utilize OTC pain management options as well as use of ice/heat to site for comfort.  He should expect progressive resolution of the healing ridge along the incisional site over the following 2-3 months.      Luis is  recommended to contact the office if worsening pain, onset of fever/redness at inc site, or new drainage from the area.  Pt also recommended to call office at any time if ongoing questions/concerns during recovery, but otherwise may follow-up on a prn basis.  Pt is in agreement with this plan.      Gerda Castillo PA-C      Please route or send letter to:  Primary Care Provider (PCP)

## 2017-06-15 NOTE — LETTER
[facility] [address]  [place] [address]  Phone: [phone]  Fax: [phone]          [facility] Note 6/15/2017    RE:  Luis Yates-:  56     Subjective:  *Seen with daughter and   Luis Yates is here for his first postoperative visit.  He underwent Laparoscopic repair of recurrent Bilateral Inguinal hernias (both were direct defects) with ProGrip Mesh by Dr. Gray.  He is now 2 weeks postop, and feels his recovery has been progressing nicely.  Rx/OTC pain medication has been used appropriately for pain control.  Postop recovery complications: he reports a small tender lump at left groin.     Today he has minimal discomfort at the incisions with activities, tolerating a regular diet, and having normal bowel activity.  Current pain management: none.  Following restrictions outlined by surgeon.     Objective:  Abd - soft, non-tender, non-distended  Right groin - no hernia, no seroma, nontender  Left groin - no hernia, small tender seroma noted  Laparoscopic incisions - skin glue removed, healing well, no erythema/bruising, +normal healing ridge, no seroma/hematoma noted, no hernia noted     Assessment:  -S/p Laparoscopic repair of recurrent Bilateral Inguinal hernias (both were direct defects) with ProGrip Mesh  -small tender seroma at left groin, his recurrent hernia was reduced from this location at the time of surgery     Plan:  Luis will monitor the seroma at the left groin for resolution over the following 1-2 months.  If he has continued lump or pain after 2 months, he will contact the surgery office for recheck of the site.     He may continue to slowly advance his activities at this time.  General recommendation is to remain at a 20 lb weight restriction until 4 weeks after surgery.  After that time, he may increase weight restriction by 10 lbs per week.  He may continue to utilize OTC pain management  options as well as use of ice/heat to site for comfort.  He should expect progressive resolution of the healing ridge along the incisional site over the following 2-3 months.       Luis is recommended to contact the office if worsening pain, onset of fever/redness at inc site, or new drainage from the area.  Pt also recommended to call office at any time if ongoing questions/concerns during recovery, but otherwise may follow-up on a prn basis.  Pt is in agreement with this plan.        Gerda Castillo PA-C

## 2017-06-15 NOTE — MR AVS SNAPSHOT
"              After Visit Summary   6/15/2017    Luis Yates    MRN: [id number]           Patient Information     Date Of Birth          1960        Visit Information        Provider Department      6/15/2017 11:15 AM Gerda Castillo PA-C; [facility] [employer]      Today's Diagnoses     Surgical followup visit    -  1       Follow-ups after your visit        Follow-up notes from your care team     Return if symptoms worsen or fail to improve.      Your next 10 appointments already scheduled     Jun 16, 2017  8:30 AM CDT   SHORT with PAYAL Baker CNP   [facility]    [address]  [facility] [address] [phone]              Who to contact     If you have questions or need follow up information about today's clinic visit or your schedule please contact [facility] directly at [phone].  Normal or non-critical lab and imaging results will be communicated to you by [employer], letter or phone within 4 business days after the clinic has received the results. If you do not hear from us within 7 days, please contact the clinic through [employer] or phone. If you have a critical or abnormal lab result, we will notify you by phone as soon as possible.  Submit refill requests through [employer] or call your pharmacy and they will forward the refill request to us. Please allow 3 business days for your refill to be completed.          Additional Information About Your Visit        [employer] Information     [employer] lets you send messages to your doctor, view your test results, renew your prescriptions, schedule appointments and more. To sign up, go to [email or web address] . Click on \"Log in\" on the left side of the screen, which will take you to the Welcome page. Then click on \"Sign up Now\" on the right side of the page.     You will be asked " to enter the access code listed below, as well as some personal information. Please follow the directions to create your username and password.     Your access code is: [email or web address]  Expires: 2017  9:27 AM     Your access code will  in 90 days. If you need help or a new code, please call your [facility] or [phone].        Care EveryWhere ID     This is your Care EveryWhere ID. This could be used by other organizations to access your [facility] medical records  [id number]        Your Vitals Were     Pulse Pulse Oximetry                91 97%           Blood Pressure from Last 3 Encounters:   06/15/17 154/80   17 165/76   17 166/83    Weight from Last 3 Encounters:   17 155 lb (70.3 kg)   17 155 lb (70.3 kg)   17 158 lb (71.7 kg)              Today, you had the following     No orders found for display       Primary Care Provider Office Phone # Fax #    PAYAL Baker [facility] [phone]       [facility] [address] [place] [address]        Thank you!     Thank you for choosing [facility]  for your care. Our goal is always to provide you with excellent care. Hearing back from our patients is one way we can continue to improve our services. Please take a few minutes to complete the written survey that you may receive in the mail after your visit with us. Thank you!             Your Updated Medication List - Protect others around you: Learn how to safely use, store and throw away your medicines at www.disposemymeds.org.          This list is accurate as of: 6/15/17 12:03 PM.  Always use your most recent med list.                   Brand Name Dispense Instructions for use    * albuterol 108 (90 BASE) MCG/ACT Inhaler    PROAIR HFA/PROVENTIL HFA/VENTOLIN HFA     Inhale 2 puffs into the lungs every 6 hours       * albuterol (2.5 MG/3ML) 0.083% neb solution     50 vial    Take 1 vial (2.5 mg) by nebulization  every 6 hours as needed for shortness of breath / dyspnea or wheezing       atenolol 50 MG tablet    TENORMIN    180 tablet    Take 2 tablets (100 mg) by mouth daily       cefdinir 300 MG capsule    OMNICEF    20 capsule    Take 1 capsule (300 mg) by mouth 2 times daily       COLCRYS PO      Take 0.6 mg by mouth daily       HYDROcodone-acetaminophen 5-325 MG per tablet    NORCO    30 tablet    Take 1-2 tablets by mouth every 4 hours as needed for other (Moderate to Severe Pain)       LOSARTAN POTASSIUM PO      Take 25 mg by mouth daily       order for DME     1 Device    Neb machine and tubing       * Notice:  This list has 2 medication(s) that are the same as other medications prescribed for you. Read the directions carefully, and ask your doctor or other care provider to review them with you.

## 2017-07-03 ENCOUNTER — TELEPHONE (OUTPATIENT)
Dept: INTERNAL MEDICINE | Facility: CLINIC | Age: 57
End: 2017-07-03

## 2017-07-03 NOTE — TELEPHONE ENCOUNTER
Patient's daughter calling to report that patient has had mid chest pain x4 days, became much worse today and kept him up much of last night.  Pain radiates into his back, denies radiation to neck, jaw, arms.  No appetite, some nausea without vomiting, pain worse after eating.  Denies SOB, diaphoresis.  Last BM 7/2/17.   Rates pain a 9 on 1-10 pain scale right now.  Due to severity of pain, advised daughter to have patient seen in ER now.  Daughter is in agreement with plan and will have him seen in ER now.  GERA Orr R.N.

## 2017-07-12 ENCOUNTER — APPOINTMENT (OUTPATIENT)
Dept: CT IMAGING | Facility: CLINIC | Age: 57
End: 2017-07-12
Attending: EMERGENCY MEDICINE
Payer: COMMERCIAL

## 2017-07-12 ENCOUNTER — HOSPITAL ENCOUNTER (EMERGENCY)
Facility: CLINIC | Age: 57
Discharge: HOME | End: 2017-07-12
Attending: EMERGENCY MEDICINE | Admitting: EMERGENCY MEDICINE
Payer: COMMERCIAL

## 2017-07-12 ENCOUNTER — APPOINTMENT (OUTPATIENT)
Dept: GENERAL RADIOLOGY | Facility: CLINIC | Age: 57
End: 2017-07-12
Attending: EMERGENCY MEDICINE
Payer: COMMERCIAL

## 2017-07-12 VITALS
DIASTOLIC BLOOD PRESSURE: 80 MMHG | TEMPERATURE: 97.9 F | RESPIRATION RATE: 20 BRPM | SYSTOLIC BLOOD PRESSURE: 142 MMHG | HEIGHT: 62 IN | OXYGEN SATURATION: 98 % | HEART RATE: 64 BPM

## 2017-07-12 DIAGNOSIS — I10 ESSENTIAL HYPERTENSION: ICD-10-CM

## 2017-07-12 DIAGNOSIS — R51.9 NONINTRACTABLE EPISODIC HEADACHE, UNSPECIFIED HEADACHE TYPE: ICD-10-CM

## 2017-07-12 DIAGNOSIS — R07.9 CHEST PAIN, UNSPECIFIED TYPE: ICD-10-CM

## 2017-07-12 LAB
ALBUMIN SERPL-MCNC: 3.5 G/DL (ref 3.4–5)
ALP SERPL-CCNC: 50 U/L (ref 40–150)
ALT SERPL W P-5'-P-CCNC: 21 U/L (ref 0–70)
ANION GAP SERPL CALCULATED.3IONS-SCNC: 7 MMOL/L (ref 3–14)
AST SERPL W P-5'-P-CCNC: 14 U/L (ref 0–45)
BASOPHILS # BLD AUTO: 0.1 10E9/L (ref 0–0.2)
BASOPHILS NFR BLD AUTO: 0.6 %
BILIRUB SERPL-MCNC: 0.4 MG/DL (ref 0.2–1.3)
BUN SERPL-MCNC: 9 MG/DL (ref 7–30)
CALCIUM SERPL-MCNC: 8.4 MG/DL (ref 8.5–10.1)
CHLORIDE SERPL-SCNC: 105 MMOL/L (ref 94–109)
CO2 SERPL-SCNC: 28 MMOL/L (ref 20–32)
CREAT SERPL-MCNC: 0.65 MG/DL (ref 0.66–1.25)
DIFFERENTIAL METHOD BLD: ABNORMAL
EOSINOPHIL # BLD AUTO: 1.6 10E9/L (ref 0–0.7)
EOSINOPHIL NFR BLD AUTO: 20 %
ERYTHROCYTE [DISTWIDTH] IN BLOOD BY AUTOMATED COUNT: 12.8 % (ref 10–15)
GFR SERPL CREATININE-BSD FRML MDRD: ABNORMAL ML/MIN/1.7M2
GLUCOSE SERPL-MCNC: 99 MG/DL (ref 70–99)
HCT VFR BLD AUTO: 37 % (ref 40–53)
HGB BLD-MCNC: 11.9 G/DL (ref 13.3–17.7)
IMM GRANULOCYTES # BLD: 0 10E9/L (ref 0–0.4)
IMM GRANULOCYTES NFR BLD: 0.1 %
INTERPRETATION ECG - MUSE: NORMAL
LYMPHOCYTES # BLD AUTO: 2.3 10E9/L (ref 0.8–5.3)
LYMPHOCYTES NFR BLD AUTO: 29.7 %
MCH RBC QN AUTO: 31.2 PG (ref 26.5–33)
MCHC RBC AUTO-ENTMCNC: 32.2 G/DL (ref 31.5–36.5)
MCV RBC AUTO: 97 FL (ref 78–100)
MONOCYTES # BLD AUTO: 0.5 10E9/L (ref 0–1.3)
MONOCYTES NFR BLD AUTO: 6.1 %
NEUTROPHILS # BLD AUTO: 3.4 10E9/L (ref 1.6–8.3)
NEUTROPHILS NFR BLD AUTO: 43.5 %
NRBC # BLD AUTO: 0 10*3/UL
NRBC BLD AUTO-RTO: 0 /100
NT-PROBNP SERPL-MCNC: 275 PG/ML (ref 0–900)
PLATELET # BLD AUTO: 173 10E9/L (ref 150–450)
POTASSIUM SERPL-SCNC: 3.5 MMOL/L (ref 3.4–5.3)
PROT SERPL-MCNC: 6.5 G/DL (ref 6.8–8.8)
RBC # BLD AUTO: 3.81 10E12/L (ref 4.4–5.9)
SODIUM SERPL-SCNC: 140 MMOL/L (ref 133–144)
TROPONIN I SERPL-MCNC: NORMAL UG/L (ref 0–0.04)
WBC # BLD AUTO: 7.7 10E9/L (ref 4–11)

## 2017-07-12 PROCEDURE — 25000128 H RX IP 250 OP 636: Performed by: EMERGENCY MEDICINE

## 2017-07-12 PROCEDURE — 96374 THER/PROPH/DIAG INJ IV PUSH: CPT | Mod: 59

## 2017-07-12 PROCEDURE — 70450 CT HEAD/BRAIN W/O DYE: CPT | Mod: XU

## 2017-07-12 PROCEDURE — 80053 COMPREHEN METABOLIC PANEL: CPT | Performed by: EMERGENCY MEDICINE

## 2017-07-12 PROCEDURE — 85025 COMPLETE CBC W/AUTO DIFF WBC: CPT | Performed by: EMERGENCY MEDICINE

## 2017-07-12 PROCEDURE — 25000132 ZZH RX MED GY IP 250 OP 250 PS 637: Performed by: EMERGENCY MEDICINE

## 2017-07-12 PROCEDURE — 83880 ASSAY OF NATRIURETIC PEPTIDE: CPT | Performed by: EMERGENCY MEDICINE

## 2017-07-12 PROCEDURE — 71020 XR CHEST 2 VW: CPT

## 2017-07-12 PROCEDURE — 70498 CT ANGIOGRAPHY NECK: CPT

## 2017-07-12 PROCEDURE — 93005 ELECTROCARDIOGRAM TRACING: CPT

## 2017-07-12 PROCEDURE — 96361 HYDRATE IV INFUSION ADD-ON: CPT

## 2017-07-12 PROCEDURE — 84484 ASSAY OF TROPONIN QUANT: CPT | Performed by: EMERGENCY MEDICINE

## 2017-07-12 PROCEDURE — 99285 EMERGENCY DEPT VISIT HI MDM: CPT | Mod: 25

## 2017-07-12 RX ORDER — IOPAMIDOL 755 MG/ML
500 INJECTION, SOLUTION INTRAVASCULAR ONCE
Status: COMPLETED | OUTPATIENT
Start: 2017-07-12 | End: 2017-07-12

## 2017-07-12 RX ORDER — HYDRALAZINE HYDROCHLORIDE 20 MG/ML
10 INJECTION INTRAMUSCULAR; INTRAVENOUS ONCE
Status: COMPLETED | OUTPATIENT
Start: 2017-07-12 | End: 2017-07-12

## 2017-07-12 RX ORDER — ASPIRIN 325 MG
325 TABLET ORAL ONCE
Status: COMPLETED | OUTPATIENT
Start: 2017-07-12 | End: 2017-07-12

## 2017-07-12 RX ORDER — CLONIDINE HYDROCHLORIDE 0.1 MG/1
0.2 TABLET ORAL ONCE
Status: COMPLETED | OUTPATIENT
Start: 2017-07-12 | End: 2017-07-12

## 2017-07-12 RX ADMIN — SODIUM CHLORIDE 1000 ML: 9 INJECTION, SOLUTION INTRAVENOUS at 16:00

## 2017-07-12 RX ADMIN — IOPAMIDOL 70 ML: 755 INJECTION, SOLUTION INTRAVENOUS at 15:35

## 2017-07-12 RX ADMIN — HYDRALAZINE HYDROCHLORIDE 10 MG: 20 INJECTION INTRAMUSCULAR; INTRAVENOUS at 15:59

## 2017-07-12 RX ADMIN — SODIUM CHLORIDE 500 ML: 9 INJECTION, SOLUTION INTRAVENOUS at 17:12

## 2017-07-12 RX ADMIN — CLONIDINE HYDROCHLORIDE 0.2 MG: 0.1 TABLET ORAL at 15:03

## 2017-07-12 RX ADMIN — ASPIRIN 325 MG ORAL TABLET 325 MG: 325 PILL ORAL at 15:59

## 2017-07-12 ASSESSMENT — VISUAL ACUITY
OS: 20/40
OD: OTHER (SEE COMMENTS)

## 2017-07-12 NOTE — ED AVS SNAPSHOT
[facility] Emergency Department    [address] [facility] [address]    Phone:  [phone]    Fax:  [phone]                                       Luis Yates   MRN: [id number]    Department:  [facility] Emergency Department   Date of Visit:  7/12/2017           Patient Information     Date Of Birth          1960        Your diagnoses for this visit were:     Nonintractable episodic headache, unspecified headache type     Essential hypertension     Chest pain, unspecified type        You were seen by Poly Haas MD and Kofi Hess DO.      Follow-up Information     Follow up with Carola Grullon APRN CNP. Call in 2 days.    Specialty:  Nurse Practitioner - Family    Why:  As needed    Contact information:    [facility]  [address] [facility] [address]  [phone]          Follow up with [facility] Emergency Department.    Specialty:  EMERGENCY MEDICINE    Why:  If symptoms worsen    Contact information:    [address] [place] [address]  [phone]        Discharge Instructions         Blurred Vision  Blurred vision is the loss of sharpness of vision and the inability to see small details. Any changes in your vision, whether sudden or gradual, should be checked out by an eye specialist.  Vision changes can be caused by many different things. These include eye diseases, side effects of some medicines, or a condition like diabetes. Vision changes should never be ignored. It is common to assume that vision changes are due to needing more powerful vision correction. Making this assumption, many people postpone seeing their healthcare provider about their vision changes. Delaying care is risky, however. Some eye problems, if left untreated, can lead to permanent vision loss that is not correctable with glasses. This can significantly reduce quality of life.  Home  care    Make changes in your home to reduce the risk of falling.    Keep walkways clear of objects you may trip over. Use nonslip pads under rugs.    Do not walk in poorly lit areas.    Be cautious when stepping up and down from curbs and walking on uneven sidewalks.    Brighter lighting in your home may help you see better.  Follow-up care  Follow up with an eye specialist or as advised. There are two types of eye doctor you can consult:    An optometrist is a licensed doctor of optometry. Optometrists are not medical doctors. Optometrists specialize in eye exams and may diagnose some eye problems. They also prescribe glasses and contact lenses.    An ophthalmologist is a medical doctor who specializes in eye care. Ophthalmologists diagnose and treat all eye diseases, prescribe medicines, and perform eye surgery. They may also prescribe glasses and contact lenses.  An optometrist can provide a basic screening eye exam for much less cost than an ophthalmologist. The optometrist can tell you if your condition needs the services of an ophthalmologist.  When to seek medical advice  Call your healthcare provider right away if any of these occur.    Sudden change in your vision    Eye pain, redness, or discharge from your eyelid    No improvement or worsening of blurriness    Dark spots in your field of vision    Halos around lights    Floaters (dots or strings moving across your field of vision)    Sudden flash of light inside your eye    Dimness of vision    Partial or complete loss of vision  Date Last Reviewed: 6/14/2015 2000-2017 [employer]. [address] [place] [address], [place] [address]. All rights reserved. This information is not intended as a substitute for professional medical care. Always follow your healthcare professional's instructions.         * HEADACHE [unspecified]    The cause of your headache today is not clear, but it does not appear to be the sign of any serious illness.  Under stress,  some people tense the muscles of their shoulder, neck and scalp without knowing it. If this condition lasts long enough, a TENSION HEADACHE can occur.  A MIGRAINE HEADACHE is caused by changes in blood flow to the brain. It can be mild or severe. A migraine attack may be triggered by emotional stress, hormone changes during the menstrual cycle, oral contraceptives, alcohol use, certain foods containing tyramine, eye strain, weather changes, missing meals, lack of sleep or oversleeping.  Other causes of headache include a viral illness, sinus, ear or throat infection, dental pain and TMJ (jaw joint) pain.  HOME CARE:    If you were given pain medicine for this headache, do not drive yourself home. Arrange for a ride, instead. When you get home, try to sleep. You should feel much better when you wake up.    If you are having nausea or vomiting, follow a light diet until your headache is relieved.    If you have a migraine type headache, use sunglasses when in the daylight or around bright indoor lighting until symptoms improve. Bright glaring light can worsen this kind of headache.  FOLLOW UP with your doctor if the headache is not better within the next 24 hours. If you have frequent headaches you should discuss a treatment plan with your primary care doctor. By being aware of the earliest signs of headache, and starting treatment right away, you may be able to stop the pain yourself.  GET PROMPT MEDICAL ATTENTION if any of the following occur:    Worsening of your head pain or no improvement within 24 hours    Repeated vomiting (unable to keep liquids down)    Fever over 101 F (38.3 C)    Stiff neck    Extreme drowsiness, confusion or fainting    Weakness of an arm or leg or one side of the face    Difficulty with speech or vision    2069-2694 [facility], [address] [place] [address]. All rights reserved. This information is not intended as a substitute for professional medical care. Always follow  your healthcare professional's instructions.     *CHEST PAIN, UNCERTAIN CAUSE    Based on your exam today, the exact cause of your chest pain is not certain. Your condition does not seem serious at this time, and your pain does not appear to be coming from your heart. However, sometimes the signs of a serious problem take more time to appear. Therefore, watch for the warning signs listed below.  HOME CARE:  1. Rest today and avoid strenuous activity.  2. Take any prescribed medicine as directed.  FOLLOW UP with your doctor in 1-3 days.   GET PROMPT MEDICAL ATTENTION if any of the following occur:    A change in the type of pain: if it feels different, becomes more severe, lasts longer, or begins to spread into your shoulder, arm, neck, jaw or back    Shortness of breath or increased pain with breathing    Weakness, dizziness, or fainting    Cough with blood or dark colored sputum (phlegm)    Fever over 101  F (38.3  C)    Swelling, pain or redness in one leg    0124-4008 [place] [address]. All rights reserved. This information is not intended as a substitute for professional medical care. Always follow your healthcare professional's instructions.      24 Hour Appointment Hotline       To make an appointment at any [facility], call [phone]). If you don't have a family doctor or clinic, we will help you find one. [facility] clinics are conveniently located to serve the needs of you and your family.             Review of your medicines      Our records show that you are taking the medicines listed below. If these are incorrect, please call your family doctor or clinic.        Dose / Directions Last dose taken    * albuterol 108 (90 BASE) MCG/ACT Inhaler   Commonly known as:  PROAIR HFA/PROVENTIL HFA/VENTOLIN HFA   Dose:  2 puff        Inhale 2 puffs into the lungs every 6 hours   Refills:  0        * albuterol (2.5 MG/3ML) 0.083% neb solution   Dose:  1  vial   Quantity:  50 vial        Take 1 vial (2.5 mg) by nebulization every 6 hours as needed for shortness of breath / dyspnea or wheezing   Refills:  1        atenolol 50 MG tablet   Commonly known as:  TENORMIN   Dose:  100 mg   Quantity:  180 tablet        Take 2 tablets (100 mg) by mouth daily   Refills:  1        cefdinir 300 MG capsule   Commonly known as:  OMNICEF   Dose:  300 mg   Quantity:  20 capsule        Take 1 capsule (300 mg) by mouth 2 times daily   Refills:  0        COLCRYS PO   Dose:  0.6 mg        Take 0.6 mg by mouth daily   Refills:  0        HYDROcodone-acetaminophen 5-325 MG per tablet   Commonly known as:  NORCO   Dose:  1-2 tablet   Quantity:  30 tablet        Take 1-2 tablets by mouth every 4 hours as needed for other (Moderate to Severe Pain)   Refills:  0        LOSARTAN POTASSIUM PO   Dose:  25 mg        Take 25 mg by mouth daily   Refills:  0        order for DME   Quantity:  1 Device        Neb machine and tubing   Refills:  prn        * Notice:  This list has 2 medication(s) that are the same as other medications prescribed for you. Read the directions carefully, and ask your doctor or other care provider to review them with you.            Procedures and tests performed during your visit     CBC with platelets differential    CT Head Neck Angio w/o & w Contrast    Comprehensive metabolic panel    EKG 12 lead    Head CT w/o contrast    Nt probnp inpatient (BNP)    Troponin I    XR Chest 2 Views      Orders Needing Specimen Collection     None      Pending Results     No orders found from 7/10/2017 to 7/13/2017.            Pending Culture Results     No orders found from 7/10/2017 to 7/13/2017.            Pending Results Instructions     If you had any lab results that were not finalized at the time of your Discharge, you can call the ED Lab Result RN at [phone]. You will be contacted by this team for any positive Lab results or changes in treatment. The nurses are available 7  days a week from 10A to 6:30P.  You can leave a message 24 hours per day and they will return your call.        Test Results From Your Hospital Stay        7/12/2017  2:33 PM      Component Results     Component Value Ref Range & Units Status    WBC 7.7 4.0 - 11.0 10e9/L Final    RBC Count 3.81 (L) 4.4 - 5.9 10e12/L Final    Hemoglobin 11.9 (L) 13.3 - 17.7 g/dL Final    Hematocrit 37.0 (L) 40.0 - 53.0 % Final    MCV 97 78 - 100 fl Final    MCH 31.2 26.5 - 33.0 pg Final    MCHC 32.2 31.5 - 36.5 g/dL Final    RDW 12.8 10.0 - 15.0 % Final    Platelet Count 173 150 - 450 10e9/L Final    Diff Method Automated Method  Final    % Neutrophils 43.5 % Final    % Lymphocytes 29.7 % Final    % Monocytes 6.1 % Final    % Eosinophils 20.0 % Final    % Basophils 0.6 % Final    % Immature Granulocytes 0.1 % Final    Nucleated RBCs 0 0 /100 Final    Absolute Neutrophil 3.4 1.6 - 8.3 10e9/L Final    Absolute Lymphocytes 2.3 0.8 - 5.3 10e9/L Final    Absolute Monocytes 0.5 0.0 - 1.3 10e9/L Final    Absolute Eosinophils 1.6 (H) 0.0 - 0.7 10e9/L Final    Absolute Basophils 0.1 0.0 - 0.2 10e9/L Final    Abs Immature Granulocytes 0.0 0 - 0.4 10e9/L Final    Absolute Nucleated RBC 0.0  Final         7/12/2017  2:52 PM      Component Results     Component Value Ref Range & Units Status    Sodium 140 133 - 144 mmol/L Final    Potassium 3.5 3.4 - 5.3 mmol/L Final    Chloride 105 94 - 109 mmol/L Final    Carbon Dioxide 28 20 - 32 mmol/L Final    Anion Gap 7 3 - 14 mmol/L Final    Glucose 99 70 - 99 mg/dL Final    Urea Nitrogen 9 7 - 30 mg/dL Final    Creatinine 0.65 (L) 0.66 - 1.25 mg/dL Final    GFR Estimate >90  Non  GFR Calc   >60 mL/min/1.7m2 Final    GFR Estimate If Black >90   GFR Calc   >60 mL/min/1.7m2 Final    Calcium 8.4 (L) 8.5 - 10.1 mg/dL Final    Bilirubin Total 0.4 0.2 - 1.3 mg/dL Final    Albumin 3.5 3.4 - 5.0 g/dL Final    Protein Total 6.5 (L) 6.8 - 8.8 g/dL Final    Alkaline Phosphatase 50 40  - 150 U/L Final    ALT 21 0 - 70 U/L Final    AST 14 0 - 45 U/L Final         7/12/2017  2:52 PM      Component Results     Component Value Ref Range & Units Status    Troponin I ES  0.000 - 0.045 ug/L Final    <0.015  The 99th percentile for upper reference range is 0.045 ug/L.  Troponin values in   the range of 0.045 - 0.120 ug/L may be associated with risks of adverse   clinical events.           7/12/2017  2:52 PM      Component Results     Component Value Ref Range & Units Status    N-Terminal Pro BNP Inpatient 275 0 - 900 pg/mL Final    Reference range shown and results flagged as abnormal are suggested inpatient   cut points for confirming diagnosis if CHF in an acute setting. Establishing   a   baseline value for each individual patient is useful for follow-up. An   inpatient or emergency department NT-proPBNP <300 pg/mL effectively rules out   acute CHF, with 99% negative predictive value.  The outpatient non-acute reference range for ruling out CHF is:   0-125 pg/mL (age 18 to less than 75)   0-450 pg/mL (age 75 yrs and older)           7/12/2017  2:59 PM      Narrative     XR CHEST 2 VW 7/12/2017 2:57 PM    HISTORY: Pain.    COMPARISON: None.        Impression     IMPRESSION: Minimal atelectasis or scarring of the right lung base. No  focal pulmonary opacities otherwise. No pleural effusions or  pneumothorax. Normal heart and mediastinum.    MADISON MOORE MD         7/12/2017  3:14 PM      Narrative     CT OF THE HEAD WITHOUT CONTRAST 7/12/2017 2:49 PM     COMPARISON: None.    HISTORY: Headache, hypertension, vision changes, change in gait.    TECHNIQUE: 5 mm thick axial CT images of the head were acquired  without IV contrast material.    FINDINGS:  There is mild diffuse cerebral volume loss. There are  subtle patchy areas of decreased density in the cerebral white matter  bilaterally that are consistent with sequela of chronic small vessel  ischemic disease.     The ventricles and basal cisterns are  within normal limits in  configuration given the degree of cerebral volume loss.  There is no  midline shift. There are no extra-axial fluid collections.     No intracranial hemorrhage, mass or recent infarct.    The visualized paranasal sinuses are well-aerated. There is no  mastoiditis. There are no fractures of the visualized bones.         Impression     IMPRESSION:  Diffuse cerebral volume loss and cerebral white matter  changes consistent with chronic small vessel ischemic disease. No  evidence for acute intracranial pathology.         Radiation dose for this scan was reduced using automated exposure  control, adjustment of the mA and/or kV according to patient size, or  iterative reconstruction technique.    WANDY ZIEGLER MD         7/12/2017  4:24 PM      Narrative     CT ANGIOGRAM OF THE HEAD AND NECK WITHOUT AND WITH CONTRAST  7/12/2017  4:03 PM     COMPARISON: None.    HISTORY: HTN headache. Evaluate for dissection/thromboembolism.    TECHNIQUE:  Precontrast localizing scans were followed by CT  angiography with an injection of 70 mL [id number]-370 nonionic intravenous  contrast material with scans through the head and neck.  Images were  transferred to a separate 3-D workstation where multiplanar  reformations and 3-D images were created.  Estimates of carotid  stenoses are made relative to the distal internal carotid artery  diameters except as noted.      FINDINGS:   Neck CTA: The common carotid arteries bilaterally are patent without  stenosis. There is calcified and noncalcified atherosclerotic plaque  at the origins of the internal carotid arteries on both sides that  does not result in stenosis on either side. The cervical internal  carotid arteries bilaterally are tortuous but are patent without  stenosis. The dominant right and slightly smaller left vertebral  arteries are patent without stenosis.    Head CTA: The basilar, bilateral distal internal carotid, bilateral  anterior cerebral, bilateral  middle cerebral and bilateral posterior  cerebral arteries are patent and unremarkable. The anterior  communicating and bilateral posterior communicating arteries are  patent and unremarkable.        Impression     IMPRESSION: Atherosclerotic plaque at the origins of the internal  carotid arteries on both sides that does not result in stenosis on  either side. Otherwise, normal neck and head CTA.      Radiation dose for this scan was reduced using automated exposure  control, adjustment of the mA and/or kV according to patient size, or  iterative reconstruction technique    WANDY ZIEGLER MD                Clinical Quality Measure: Blood Pressure Screening     Your blood pressure was checked while you were in the emergency department today. The last reading we obtained was  BP: 142/80 . Please read the guidelines below about what these numbers mean and what you should do about them.  If your systolic blood pressure (the top number) is less than 120 and your diastolic blood pressure (the bottom number) is less than 80, then your blood pressure is normal. There is nothing more that you need to do about it.  If your systolic blood pressure (the top number) is 120-139 or your diastolic blood pressure (the bottom number) is 80-89, your blood pressure may be higher than it should be. You should have your blood pressure rechecked within a year by a primary care provider.  If your systolic blood pressure (the top number) is 140 or greater or your diastolic blood pressure (the bottom number) is 90 or greater, you may have high blood pressure. High blood pressure is treatable, but if left untreated over time it can put you at risk for heart attack, stroke, or kidney failure. You should have your blood pressure rechecked by a primary care provider within the next 4 weeks.  If your provider in the emergency department today gave you specific instructions to follow-up with your doctor or provider even sooner than that, you  "should follow that instruction and not wait for up to 4 weeks for your follow-up visit.        Thank you for choosing [facility]       Thank you for choosing [facility] for your care. Our goal is always to provide you with excellent care. Hearing back from our patients is one way we can continue to improve our services. Please take a few minutes to complete the written survey that you may receive in the mail after you visit with us. Thank you!        [employer] Information     [employer] lets you send messages to your doctor, view your test results, renew your prescriptions, schedule appointments and more. To sign up, go to [email or web address] . Click on \"Log in\" on the left side of the screen, which will take you to the Welcome page. Then click on \"Sign up Now\" on the right side of the page.     You will be asked to enter the access code listed below, as well as some personal information. Please follow the directions to create your username and password.     Your access code is: [email or web address]  Expires: 2017  9:27 AM     Your access code will  in 90 days. If you need help or a new code, please call your [facility] clinic or [phone].        Care EveryWhere ID     This is your Care EveryWhere ID. This could be used by other organizations to access your [facility] medical records  [id number]        Equal Access to Services     LILIANA KENDALL : Abbie Chavira, waaxda luqadaha, qaybta kaalmada logan, madison mojica . So [facility] [phone].    ATENCIÓN: Si habla español, tiene a urban disposición servicios gratuitos de asistencia lingüística. Llame al [phone].    We comply with applicable federal civil rights laws and [place] laws. We do not discriminate on the basis of race, color, national origin, age, disability sex, sexual orientation or gender identity.            After Visit Summary       This is your record. Keep this with you and show to your community " pharmacist(s) and doctor(s) at your next visit.

## 2017-07-12 NOTE — DISCHARGE INSTRUCTIONS
Blurred Vision  Blurred vision is the loss of sharpness of vision and the inability to see small details. Any changes in your vision, whether sudden or gradual, should be checked out by an eye specialist.  Vision changes can be caused by many different things. These include eye diseases, side effects of some medicines, or a condition like diabetes. Vision changes should never be ignored. It is common to assume that vision changes are due to needing more powerful vision correction. Making this assumption, many people postpone seeing their healthcare provider about their vision changes. Delaying care is risky, however. Some eye problems, if left untreated, can lead to permanent vision loss that is not correctable with glasses. This can significantly reduce quality of life.  Home care    Make changes in your home to reduce the risk of falling.    Keep walkways clear of objects you may trip over. Use nonslip pads under rugs.    Do not walk in poorly lit areas.    Be cautious when stepping up and down from curbs and walking on uneven sidewalks.    Brighter lighting in your home may help you see better.  Follow-up care  Follow up with an eye specialist or as advised. There are two types of eye doctor you can consult:    An optometrist is a licensed doctor of optometry. Optometrists are not medical doctors. Optometrists specialize in eye exams and may diagnose some eye problems. They also prescribe glasses and contact lenses.    An ophthalmologist is a medical doctor who specializes in eye care. Ophthalmologists diagnose and treat all eye diseases, prescribe medicines, and perform eye surgery. They may also prescribe glasses and contact lenses.  An optometrist can provide a basic screening eye exam for much less cost than an ophthalmologist. The optometrist can tell you if your condition needs the services of an ophthalmologist.  When to seek medical advice  Call your healthcare provider right away if any of these  occur.    Sudden change in your vision    Eye pain, redness, or discharge from your eyelid    No improvement or worsening of blurriness    Dark spots in your field of vision    Halos around lights    Floaters (dots or strings moving across your field of vision)    Sudden flash of light inside your eye    Dimness of vision    Partial or complete loss of vision  Date Last Reviewed: 6/14/2015 2000-2017 [employer]. [address] [place] [address], [place] [address]. All rights reserved. This information is not intended as a substitute for professional medical care. Always follow your healthcare professional's instructions.         * HEADACHE [unspecified]    The cause of your headache today is not clear, but it does not appear to be the sign of any serious illness.  Under stress, some people tense the muscles of their shoulder, neck and scalp without knowing it. If this condition lasts long enough, a TENSION HEADACHE can occur.  A MIGRAINE HEADACHE is caused by changes in blood flow to the brain. It can be mild or severe. A migraine attack may be triggered by emotional stress, hormone changes during the menstrual cycle, oral contraceptives, alcohol use, certain foods containing tyramine, eye strain, weather changes, missing meals, lack of sleep or oversleeping.  Other causes of headache include a viral illness, sinus, ear or throat infection, dental pain and TMJ (jaw joint) pain.  HOME CARE:    If you were given pain medicine for this headache, do not drive yourself home. Arrange for a ride, instead. When you get home, try to sleep. You should feel much better when you wake up.    If you are having nausea or vomiting, follow a light diet until your headache is relieved.    If you have a migraine type headache, use sunglasses when in the daylight or around bright indoor lighting until symptoms improve. Bright glaring light can worsen this kind of headache.  FOLLOW UP with your doctor if the headache is not  better within the next 24 hours. If you have frequent headaches you should discuss a treatment plan with your primary care doctor. By being aware of the earliest signs of headache, and starting treatment right away, you may be able to stop the pain yourself.  GET PROMPT MEDICAL ATTENTION if any of the following occur:    Worsening of your head pain or no improvement within 24 hours    Repeated vomiting (unable to keep liquids down)    Fever over 101 F (38.3 C)    Stiff neck    Extreme drowsiness, confusion or fainting    Weakness of an arm or leg or one side of the face    Difficulty with speech or vision    [id number] [place] [address]. All rights reserved. This information is not intended as a substitute for professional medical care. Always follow your healthcare professional's instructions.     *CHEST PAIN, UNCERTAIN CAUSE    Based on your exam today, the exact cause of your chest pain is not certain. Your condition does not seem serious at this time, and your pain does not appear to be coming from your heart. However, sometimes the signs of a serious problem take more time to appear. Therefore, watch for the warning signs listed below.  HOME CARE:  1. Rest today and avoid strenuous activity.  2. Take any prescribed medicine as directed.  FOLLOW UP with your doctor in 1-3 days.   GET PROMPT MEDICAL ATTENTION if any of the following occur:    A change in the type of pain: if it feels different, becomes more severe, lasts longer, or begins to spread into your shoulder, arm, neck, jaw or back    Shortness of breath or increased pain with breathing    Weakness, dizziness, or fainting    Cough with blood or dark colored sputum (phlegm)    Fever over 101  F (38.3  C)    Swelling, pain or redness in one leg    [address] [place] [address]. All rights reserved. This information is not intended as a substitute for professional medical care.  Always follow your healthcare professional's instructions.

## 2017-07-12 NOTE — ED AVS SNAPSHOT
[facility] Emergency Department    [address]    [facility] [address]    Phone:  [phone]    Fax:  [phone]                                       Luis Yates   MRN: [id number]    Department:  [facility] Emergency Department   Date of Visit:  7/12/2017           After Visit Summary Signature Page     I have received my discharge instructions, and my questions have been answered. I have discussed any challenges I see with this plan with the nurse or doctor.    ..........................................................................................................................................  Patient/Patient Representative Signature      ..........................................................................................................................................  Patient Representative Print Name and Relationship to Patient    ..................................................               ................................................  Date                                            Time    ..........................................................................................................................................  Reviewed by Signature/Title    ...................................................              ..............................................  Date                                                            Time

## 2017-07-12 NOTE — ED PROVIDER NOTES
History     Chief Complaint:  Eye Problem; Headache; and Chest Pain      HPI: Luis Yates is a 57 year old male who presents for evaluation of the above.  He told his daughter that he had chest pain and swelling about 9:00. That has since resolved. Since noon he has noticed a gradually increasing headache and describes blurry vision as well as neck pain. The patient has not been febrile.    His past medical history consists of hypertension. He has no diabetes or cholesterol.  Surgical history significant for bilateral inguinal hernia repair one month ago.      Allergies:  NKDA    Medications:      HYDROcodone-acetaminophen (NORCO) 5-325 MG per tablet   atenolol (TENORMIN) 50 MG tablet   LOSARTAN POTASSIUM PO   albuterol (PROAIR HFA/PROVENTIL HFA/VENTOLIN HFA) 108 (90 BASE) MCG/ACT Inhaler   albuterol (2.5 MG/3ML) 0.083% neb solution   order for DME   cefdinir (OMNICEF) 300 MG capsule   Colchicine (COLCRYS PO)       Past Medical History:    HTN  Gout    Past Surgical History:    B/L Inguinal herniorrhaphy 6/1/2017    Family History:    No family history of cardiac disease noted    Social History:  Tobacco, alcohol, drugs: no    Review of Systems  Please see the HPI. A comprehensive 10 point review of systems was performed and is otherwise negative except as noted in HPI.     Physical Exam   Vital signs:  Patient Vitals for the past 24 hrs:   BP Temp Temp src Pulse Heart Rate Resp SpO2 Height   07/12/17 1857 - - - - 67 - 98 % -   07/12/17 1856 - - - - 69 - 98 % -   07/12/17 1855 142/80 - - - 66 - 99 % -   07/12/17 1841 - - - - 68 - 98 % -   07/12/17 1840 140/74 - - - 66 - 98 % -   07/12/17 1839 - - - - 68 - 98 % -   07/12/17 1838 - - - - 67 - 98 % -   07/12/17 1837 - - - - 65 - 98 % -   07/12/17 1836 - - - - 67 - 98 % -   07/12/17 1834 - - - - 63 - 98 % -   07/12/17 1833 - - - - 66 - 93 % -   07/12/17 1832 126/67 - - - 66 - 98 % -   07/12/17 1831 - - - - 66 - 99 % -   07/12/17 1715 - - - - 66 - 96 % -  "  07/12/17 1655 96/56 - - - 69 - 94 % -   07/12/17 1653 - - - - 68 - 95 % -   07/12/17 1610 143/76 - - - 73 - 99 % -   07/12/17 1609 - - - - 69 - 98 % -   07/12/17 1558 - - - - 62 - 100 % -   07/12/17 1557 - - - - 61 - 100 % -   07/12/17 1556 173/85 - - - - - - -   07/12/17 1421 - - - - 63 - 96 % -   07/12/17 1420 (!) 193/96 - - - 64 - - -   07/12/17 1327 200/80 97.9  F (36.6  C) Oral 64 - 20 96 % 1.575 m (5' 2\")       Physical Exam  Constitutional: Patient appears well-developed and well-nourished.   HENT:   Head: No external signs of trauma noted.  Eyes: Pupils are equal, round, and reactive to light.   Cardiovascular: Normal rate, regular rhythm, normal heart sounds and intact distal pulses.    Pulmonary/Chest: Effort normal and breath sounds normal. No respiratory distress. No wheezes noted.   Abdominal: Soft. There is no tenderness. There is no rebound.   Neurological:    Patient is alert and oriented to person, place, and time.    Speech is fluent, cognition is normal.   CN 2-12 intact (PERRL, EOMI, symmetric smile, equal eye squeeze and forehead raise, normal and equal sensation to bilateral forehead/cheek/chin, equal hearing to finger rub, midline tongue protrusion with nl side-to-side movement, normal shoulder shrug).    RUE strength 5/5: , finger abd, wrist flex/ext, elbow flex/ext.    LUE strength 5/5: , finger abd, wrist flex/ext, elbow flex/ext.    RLE strength 5/5: ankle flex/ext, knee flex/ext, hip flex.    LLE strength 5/5: ankle flex/ext, knee flex/ext, hip flex.    Sensation equal in all 4 extremities.    No arm drift.     Cerebellar: Normal rapid alternating movements     ( finger-nose-finger, rapid pronation/supination, hand rolling)    Normal heel-to-shin   Normal gait.   Skin: Skin is warm and dry.         Emergency Department Course   ECG:  NSR  Normal EKG  Rate: 65. HI: 156. QRS: 80. QTc: 403.  No change noted from 5/26/2017  Interpreted by me at 1515 on 7/12/2017  (Initial quick " interpretation at 1353 by Dr. Haas)    Imaging:    CT Head Neck Angio w/o & w Contrast   Final Result   IMPRESSION: Atherosclerotic plaque at the origins of the internal   carotid arteries on both sides that does not result in stenosis on   either side. Otherwise, normal neck and head CTA.         Radiation dose for this scan was reduced using automated exposure   control, adjustment of the mA and/or kV according to patient size, or   iterative reconstruction technique      WANDY ZIEGLER MD      Head CT w/o contrast   Final Result   IMPRESSION:  Diffuse cerebral volume loss and cerebral white matter   changes consistent with chronic small vessel ischemic disease. No   evidence for acute intracranial pathology.             Radiation dose for this scan was reduced using automated exposure   control, adjustment of the mA and/or kV according to patient size, or   iterative reconstruction technique.      WANDY ZIEGLER MD      XR Chest 2 Views   Final Result   IMPRESSION: Minimal atelectasis or scarring of the right lung base. No   focal pulmonary opacities otherwise. No pleural effusions or   pneumothorax. Normal heart and mediastinum.      MADISON MOORE MD          Laboratory:    Labs Ordered and Resulted from Time of ED Arrival Up to the Time of Departure from the ED   CBC WITH PLATELETS DIFFERENTIAL - Abnormal; Notable for the following:        Result Value    RBC Count 3.81 (*)     Hemoglobin 11.9 (*)     Hematocrit 37.0 (*)     Absolute Eosinophils 1.6 (*)     All other components within normal limits   COMPREHENSIVE METABOLIC PANEL - Abnormal; Notable for the following:     Creatinine 0.65 (*)     Calcium 8.4 (*)     Protein Total 6.5 (*)     All other components within normal limits   TROPONIN I   NT PROBNP INPATIENT     Interventions:  1503: Catapres 0.2 mg PO  1559: Aspirin 325 mg PO  1559: Hydralazine 10 mg IV    Emergency Department Course:  The patient was brought to the ED and had the above H&P performed.  An IV was placed. Labs were drawn. The patient was sent for radiological imaging. See above for meds/radiology/procedures intervention.     The patient was informed of the results of the above labs/imaging studies.     The patient was discharged in stable condition    Impression & Plan      Medical Decision Making:  Luis Yates is a 57 year old male presented to the Emergency Department with a complaint of chest pain and headache. Fortunately the workup in the ED has been unremarkable. The EKG shows NSR. The troponin is negative (at almost 6 hours), and the patient's HEART Score is 2. Given these findings, he is low risk for a major cardiac event at 6 weeks.   I considered other possible causes of chest pain including PE, infection, pneumothorax, aortic dissection, and even more benign causes such as reflux and esophageal motility issues. No worrisome etiology seems likely. He also had a headache that may have been due to HTN. His blood pressure was improved with catapres and hydralazine. He notes chronic vision changes in the right eye, but had some blurriness in the left. He notes improvement with his good eye during his time here.  There is no associated numbness, paresthesia or confusion and I doubt stroke or CNS tumor. The patient was treated symptomatically and pain has improved with medication interventions. The patient received neuroimaging that did not demonstrate a mass, aneurysm, or stroke. The patient's neuroimaging was normal, and I believe SAH is unlikely.     At this time I believe the patient is stable for discharge. I have encouraged close Primary Care Physician follow up. Full anticipatory guidance given prior to discharge.          Diagnosis:    ICD-10-CM    1. Nonintractable episodic headache, unspecified headache type R51    2. Essential hypertension I10    3. Chest pain, unspecified type R07.9        Disposition:  discharged to home    Kofi Hess D.O.  7/12/2017  [facility]  [facility] EMERGENCY DEPARTMENT           Hess, Kofi Scout,   07/12/17 6028

## 2017-07-12 NOTE — ED NOTES
Patient here with daughter who is interpretting. Headache, blurred vision, htn, and chest pain since 0900 increasingly worse.  Alert and oriented per daughter

## 2017-07-13 ENCOUNTER — OFFICE VISIT (OUTPATIENT)
Dept: INTERNAL MEDICINE | Facility: CLINIC | Age: 57
End: 2017-07-13
Payer: COMMERCIAL

## 2017-07-13 VITALS
BODY MASS INDEX: 28.02 KG/M2 | HEART RATE: 66 BPM | WEIGHT: 153.2 LBS | SYSTOLIC BLOOD PRESSURE: 184 MMHG | DIASTOLIC BLOOD PRESSURE: 72 MMHG | OXYGEN SATURATION: 97 % | TEMPERATURE: 98.7 F

## 2017-07-13 DIAGNOSIS — I10 HTN (HYPERTENSION), BENIGN: Primary | ICD-10-CM

## 2017-07-13 DIAGNOSIS — D64.9 ANEMIA, UNSPECIFIED TYPE: ICD-10-CM

## 2017-07-13 PROCEDURE — 99215 OFFICE O/P EST HI 40 MIN: CPT | Performed by: NURSE PRACTITIONER

## 2017-07-13 RX ORDER — LOSARTAN POTASSIUM 50 MG/1
50 TABLET ORAL DAILY
Qty: 90 TABLET | Refills: 1 | Status: SHIPPED | OUTPATIENT
Start: 2017-07-13 | End: 2017-07-21

## 2017-07-13 NOTE — PROGRESS NOTES
.  SUBJECTIVE:                                                    Luis Yates is a 57 year old male who presents to clinic today for the following health issues:      ED/UC Followup:    Facility:  [facility]  Date of visit: 7-12-17  Reason for visit: H/A, HTN, Chest pain  Current Status: feeling better   Blood pressure at home has been in good level per him but number are   148//56  Very high in ER     [place] probably in about 3 weeks time   Will follow up before his trip     HGB slightly low  Will do colonoscopy endoscopy hopefully prior to trip   Also lab to check iron and B12         Problem list and histories reviewed & adjusted, as indicated.  Additional history: as documented    Patient Active Problem List   Diagnosis     HTN (hypertension), benign     Gout     Hypercholesteremia     Past Surgical History:   Procedure Laterality Date     HERNIA REPAIR  2010    left      HERNIA REPAIR  2010     LAPAROSCOPIC HERNIORRHAPHY INGUINAL BILATERAL Bilateral 6/1/2017    Procedure: LAPAROSCOPIC HERNIORRHAPHY INGUINAL BILATERAL;  LAPAROSCOPIC HERNIORRHAPHY INGUINAL BILATERAL with mesh;  Surgeon: Pola Barbosa MD;  Location:  OR       Social History   Substance Use Topics     Smoking status: Never Smoker     Smokeless tobacco: Not on file     Alcohol use No     Family History   Problem Relation Age of Onset     Unknown/Adopted Mother      Unknown/Adopted Father            Reviewed and updated as needed this visit by clinical staff  Tobacco  Allergies  Meds       Reviewed and updated as needed this visit by Provider         ROS:  Constitutional, HEENT, cardiovascular, pulmonary, GI, , musculoskeletal, neuro, skin, endocrine and psych systems are negative, except as otherwise noted.    OBJECTIVE:     /72 (BP Location: Left arm, Patient Position: Sitting, Cuff Size: Adult Regular)  Pulse 66  Temp 98.7  F (37.1  C) (Oral)  Wt 153 lb 3.2 oz (69.5 kg)  SpO2 97%  BMI  28.02 kg/m2  Body mass index is 28.02 kg/(m^2).  GENERAL: healthy, alert and no distress  RESP: lungs clear to auscultation - no rales, rhonchi or wheezes  CV: regular rate and rhythm, normal S1 S2, no S3 or S4, no murmur, click or rub, no peripheral edema and peripheral pulses strong  ABDOMEN: soft, nontender, no hepatosplenomegaly, no masses and bowel sounds normal  MS: no gross musculoskeletal defects noted, no edema  NEURO: Normal strength and tone, mentation intact and speech normal  PSYCH: mentation appears normal, affect normal/bright    Diagnostic Test Results:  Labs   Colonoscopy/ endoscopy     ASSESSMENT/PLAN:             1. HTN (hypertension), benign  Not in good range   Repeat blood pressure 178/64  Increase in medication   - losartan (COZAAR) 50 MG tablet; Take 1 tablet (50 mg) by mouth daily  Dispense: 90 tablet; Refill: 1  - Basic metabolic panel; Future    2. Anemia, unspecified type  Lower Hgb   Due for colonoscopy and will do endoscopy for low HGB   - CBC with platelets differential; Future  - Iron and iron binding capacity; Future  - Ferritin; Future  - Vitamin B12; Future  - Folate; Future  - GASTROENTEROLOGY ADULT REF PROCEDURE ONLY    Patient Instructions   Losartan  50 mg daily for blood pressure   May use 2 tab of the 25 mg tabs you have until gone, and then new prescription is at pharmacy     Follow up in 2-3 weeks before you leave     Colonoscopy and endoscopy recommended related to anemia    They will call you to set up       PAYAL Baker [facility]

## 2017-07-13 NOTE — MR AVS SNAPSHOT
After Visit Summary   7/13/2017    Luis Yates    MRN: [id number]           Patient Information     Date Of Birth          1960        Visit Information        Provider Department      7/13/2017 10:40 AM Open, Assignments; Carola Grullon APRN [facility]        Today's Diagnoses     HTN (hypertension), benign    -  1    Anemia, unspecified type          Care Instructions    Losartan  50 mg daily for blood pressure   May use 2 tab of the 25 mg tabs you have until gone, and then new prescription is at pharmacy     Follow up in 2-3 weeks before you leave     Colonoscopy and endoscopy recommended related to anemia    They will call you to set up           Follow-ups after your visit        Additional Services     GASTROENTEROLOGY ADULT REF PROCEDURE ONLY       Last Lab Result: Creatinine (mg/dL)       Date                     Value                 07/12/2017               0.65 (L)         ----------  Body mass index is 28.02 kg/(m^2).     Needed:  Yes  Language:  Croatian    Patient will be contacted to schedule procedure.     Please be aware that coverage of these services is subject to the terms and limitations of your health insurance plan.  Call member services at your health plan with any benefit or coverage questions.  Any procedures must be performed at a [place] facility OR coordinated by your clinic's referral office.    Please bring the following with you to your appointment:    (1) Any X-Rays, CTs or MRIs which have been performed.  Contact the facility where they were done to arrange for  prior to your scheduled appointment.    (2) List of current medications   (3) This referral request   (4) Any documents/labs given to you for this referral                  Future tests that were ordered for you today     Open Future Orders        Priority Expected Expires Ordered    Basic metabolic panel Routine  8/28/2017 7/13/2017    CBC with platelets  "differential Routine  2017    Iron and iron binding capacity Routine  2017    Ferritin Routine  2017    Vitamin B12 Routine  2017    Folate Routine  2017            Who to contact     If you have questions or need follow up information about today's clinic visit or your schedule please contact [facility] directly at [phone].  Normal or non-critical lab and imaging results will be communicated to you by MyChart, letter or phone within 4 business days after the clinic has received the results. If you do not hear from us within 7 days, please contact the clinic through [employer] or phone. If you have a critical or abnormal lab result, we will notify you by phone as soon as possible.  Submit refill requests through [employer] or call your pharmacy and they will forward the refill request to us. Please allow 3 business days for your refill to be completed.          Additional Information About Your Visit        [facility] Information     [employer] lets you send messages to your doctor, view your test results, renew your prescriptions, schedule appointments and more. To sign up, go to [email or web address] . Click on \"Log in\" on the left side of the screen, which will take you to the Welcome page. Then click on \"Sign up Now\" on the right side of the page.     You will be asked to enter the access code listed below, as well as some personal information. Please follow the directions to create your username and password.     Your access code is: [email or web address]  Expires: 2017  9:27 AM     Your access code will  in 90 days. If you need help or a new code, please call your [facility] clinic or [phone].        Care EveryWhere ID     This is your Care EveryWhere ID. This could be used by other organizations to access your [facility] medical records  [id number]        Your Vitals Were     Pulse Temperature Pulse Oximetry BMI (Body " Mass Index)          66 98.7  F (37.1  C) (Oral) 97% 28.02 kg/m2         Blood Pressure from Last 3 Encounters:   07/13/17 184/72   07/12/17 142/80   06/15/17 154/80    Weight from Last 3 Encounters:   07/13/17 153 lb 3.2 oz (69.5 kg)   06/01/17 155 lb (70.3 kg)   05/26/17 155 lb (70.3 kg)              We Performed the Following     GASTROENTEROLOGY ADULT REF PROCEDURE ONLY          Today's Medication Changes          These changes are accurate as of: 7/13/17 11:15 AM.  If you have any questions, ask your nurse or doctor.               These medicines have changed or have updated prescriptions.        Dose/Directions    losartan 50 MG tablet   Commonly known as:  COZAAR   This may have changed:    - medication strength  - how much to take   Used for:  HTN (hypertension), benign   Changed by:  Carola Grullon APRN CNP        Dose:  50 mg   Take 1 tablet (50 mg) by mouth daily   Quantity:  90 tablet   Refills:  1            Where to get your medicines      These medications were sent to [id number] [place] [id number]     Phone:  [phone]     losartan 50 MG tablet                Primary Care Provider Office Phone # Fax #    PAYAL Baker [phone]       [facility] [address] [facility] [address]        Equal Access to Services     LILIANA KENDALL AH: Hadii marlene ku hadasho Soomaali, waaxda luqadaha, qaybta kaalmada adeegyada, waxay jordy orr. So [facility] [phone].    ATENCIÓN: Si habla español, tiene a urban disposición servicios gratuitos de asistencia lingüística. Llame al [phone].    We comply with applicable federal civil rights laws and [place] laws. We do not discriminate on the basis of race, color, national origin, age, disability sex, sexual orientation or gender identity.            Thank you!     Thank you for choosing [facility]   for your care. Our goal is always to provide you with excellent care. Hearing back from our patients is one way we can continue to improve our services. Please take a few minutes to complete the written survey that you may receive in the mail after your visit with us. Thank you!             Your Updated Medication List - Protect others around you: Learn how to safely use, store and throw away your medicines at www.disposemymeds.org.          This list is accurate as of: 7/13/17 11:15 AM.  Always use your most recent med list.                   Brand Name Dispense Instructions for use Diagnosis    * albuterol 108 (90 BASE) MCG/ACT Inhaler    PROAIR HFA/PROVENTIL HFA/VENTOLIN HFA     Inhale 2 puffs into the lungs every 6 hours        * albuterol (2.5 MG/3ML) 0.083% neb solution     50 vial    Take 1 vial (2.5 mg) by nebulization every 6 hours as needed for shortness of breath / dyspnea or wheezing    Acute bronchospasm, Acute bronchitis with symptoms > 10 days       atenolol 50 MG tablet    TENORMIN    180 tablet    Take 2 tablets (100 mg) by mouth daily    Essential hypertension       COLCRYS PO      Take 0.6 mg by mouth daily        HYDROcodone-acetaminophen 5-325 MG per tablet    NORCO    30 tablet    Take 1-2 tablets by mouth every 4 hours as needed for other (Moderate to Severe Pain)    Bilateral recurrent inguinal hernia without obstruction or gangrene       losartan 50 MG tablet    COZAAR    90 tablet    Take 1 tablet (50 mg) by mouth daily    HTN (hypertension), benign       order for DME     1 Device    Neb machine and tubing    Acute bronchospasm, Acute bronchitis with symptoms > 10 days       * Notice:  This list has 2 medication(s) that are the same as other medications prescribed for you. Read the directions carefully, and ask your doctor or other care provider to review them with you.

## 2017-07-13 NOTE — PATIENT INSTRUCTIONS
Losartan  50 mg daily for blood pressure   May use 2 tab of the 25 mg tabs you have until gone, and then new prescription is at pharmacy     Follow up in 2-3 weeks before you leave     Colonoscopy and endoscopy recommended related to anemia    They will call you to set up

## 2017-07-13 NOTE — NURSING NOTE
"Chief Complaint   Patient presents with     ER F/U   Phone interpretation today   Here with daughter    Initial /72 (BP Location: Left arm, Patient Position: Sitting, Cuff Size: Adult Regular)  Pulse 66  Temp 98.7  F (37.1  C) (Oral)  Wt 153 lb 3.2 oz (69.5 kg)  SpO2 97%  BMI 28.02 kg/m2 Estimated body mass index is 28.02 kg/(m^2) as calculated from the following:    Height as of 7/12/17: 5' 2\" (1.575 m).    Weight as of this encounter: 153 lb 3.2 oz (69.5 kg).  Medication Reconciliation: complete    "

## 2017-07-14 ENCOUNTER — APPOINTMENT (OUTPATIENT)
Dept: ULTRASOUND IMAGING | Facility: CLINIC | Age: 57
End: 2017-07-14
Attending: EMERGENCY MEDICINE
Payer: COMMERCIAL

## 2017-07-14 ENCOUNTER — TELEPHONE (OUTPATIENT)
Dept: INTERNAL MEDICINE | Facility: CLINIC | Age: 57
End: 2017-07-14

## 2017-07-14 ENCOUNTER — APPOINTMENT (OUTPATIENT)
Dept: CT IMAGING | Facility: CLINIC | Age: 57
End: 2017-07-14
Attending: EMERGENCY MEDICINE
Payer: COMMERCIAL

## 2017-07-14 ENCOUNTER — HOSPITAL ENCOUNTER (EMERGENCY)
Facility: CLINIC | Age: 57
Discharge: HOME | End: 2017-07-14
Attending: EMERGENCY MEDICINE | Admitting: EMERGENCY MEDICINE
Payer: COMMERCIAL

## 2017-07-14 VITALS
OXYGEN SATURATION: 97 % | BODY MASS INDEX: 27.98 KG/M2 | TEMPERATURE: 98.1 F | WEIGHT: 153 LBS | HEART RATE: 71 BPM | DIASTOLIC BLOOD PRESSURE: 94 MMHG | SYSTOLIC BLOOD PRESSURE: 186 MMHG | RESPIRATION RATE: 16 BRPM

## 2017-07-14 DIAGNOSIS — R91.1 PULMONARY NODULE, RIGHT: ICD-10-CM

## 2017-07-14 DIAGNOSIS — R07.9 CHEST PAIN, UNSPECIFIED TYPE: ICD-10-CM

## 2017-07-14 DIAGNOSIS — I99.8 POORLY CONTROLLED BLOOD PRESSURE: ICD-10-CM

## 2017-07-14 DIAGNOSIS — R10.13 ABDOMINAL PAIN, EPIGASTRIC: ICD-10-CM

## 2017-07-14 LAB
ALBUMIN SERPL-MCNC: 3.7 G/DL (ref 3.4–5)
ALP SERPL-CCNC: 55 U/L (ref 40–150)
ALT SERPL W P-5'-P-CCNC: 21 U/L (ref 0–70)
ANION GAP SERPL CALCULATED.3IONS-SCNC: 5 MMOL/L (ref 3–14)
AST SERPL W P-5'-P-CCNC: 22 U/L (ref 0–45)
BASOPHILS # BLD AUTO: 0 10E9/L (ref 0–0.2)
BASOPHILS NFR BLD AUTO: 0.6 %
BILIRUB SERPL-MCNC: 0.4 MG/DL (ref 0.2–1.3)
BUN SERPL-MCNC: 11 MG/DL (ref 7–30)
CALCIUM SERPL-MCNC: 8.4 MG/DL (ref 8.5–10.1)
CHLORIDE SERPL-SCNC: 103 MMOL/L (ref 94–109)
CO2 SERPL-SCNC: 28 MMOL/L (ref 20–32)
CREAT SERPL-MCNC: 0.6 MG/DL (ref 0.66–1.25)
D DIMER PPP FEU-MCNC: 2.1 UG/ML FEU (ref 0–0.5)
DIFFERENTIAL METHOD BLD: ABNORMAL
EOSINOPHIL # BLD AUTO: 1.1 10E9/L (ref 0–0.7)
EOSINOPHIL NFR BLD AUTO: 15.4 %
ERYTHROCYTE [DISTWIDTH] IN BLOOD BY AUTOMATED COUNT: 12.8 % (ref 10–15)
GFR SERPL CREATININE-BSD FRML MDRD: ABNORMAL ML/MIN/1.7M2
GLUCOSE SERPL-MCNC: 107 MG/DL (ref 70–99)
HCT VFR BLD AUTO: 39 % (ref 40–53)
HGB BLD-MCNC: 12.6 G/DL (ref 13.3–17.7)
IMM GRANULOCYTES # BLD: 0 10E9/L (ref 0–0.4)
IMM GRANULOCYTES NFR BLD: 0.1 %
INTERPRETATION ECG - MUSE: NORMAL
INTERPRETATION ECG - MUSE: NORMAL
LIPASE SERPL-CCNC: 195 U/L (ref 73–393)
LYMPHOCYTES # BLD AUTO: 2.2 10E9/L (ref 0.8–5.3)
LYMPHOCYTES NFR BLD AUTO: 31.7 %
MCH RBC QN AUTO: 31.3 PG (ref 26.5–33)
MCHC RBC AUTO-ENTMCNC: 32.3 G/DL (ref 31.5–36.5)
MCV RBC AUTO: 97 FL (ref 78–100)
MONOCYTES # BLD AUTO: 0.5 10E9/L (ref 0–1.3)
MONOCYTES NFR BLD AUTO: 7.3 %
NEUTROPHILS # BLD AUTO: 3.1 10E9/L (ref 1.6–8.3)
NEUTROPHILS NFR BLD AUTO: 44.9 %
NRBC # BLD AUTO: 0 10*3/UL
NRBC BLD AUTO-RTO: 0 /100
NT-PROBNP SERPL-MCNC: 146 PG/ML (ref 0–900)
PLATELET # BLD AUTO: 212 10E9/L (ref 150–450)
POTASSIUM SERPL-SCNC: 3.5 MMOL/L (ref 3.4–5.3)
PROT SERPL-MCNC: 7.2 G/DL (ref 6.8–8.8)
RBC # BLD AUTO: 4.02 10E12/L (ref 4.4–5.9)
SODIUM SERPL-SCNC: 136 MMOL/L (ref 133–144)
TROPONIN I BLD-MCNC: 0 UG/L (ref 0–0.1)
TROPONIN I SERPL-MCNC: NORMAL UG/L (ref 0–0.04)
WBC # BLD AUTO: 6.9 10E9/L (ref 4–11)

## 2017-07-14 PROCEDURE — 93005 ELECTROCARDIOGRAM TRACING: CPT | Mod: 76

## 2017-07-14 PROCEDURE — 25000128 H RX IP 250 OP 636: Performed by: EMERGENCY MEDICINE

## 2017-07-14 PROCEDURE — 25000125 ZZHC RX 250: Performed by: EMERGENCY MEDICINE

## 2017-07-14 PROCEDURE — 84484 ASSAY OF TROPONIN QUANT: CPT | Performed by: EMERGENCY MEDICINE

## 2017-07-14 PROCEDURE — 25000132 ZZH RX MED GY IP 250 OP 250 PS 637: Performed by: EMERGENCY MEDICINE

## 2017-07-14 PROCEDURE — 93005 ELECTROCARDIOGRAM TRACING: CPT

## 2017-07-14 PROCEDURE — 83880 ASSAY OF NATRIURETIC PEPTIDE: CPT | Performed by: EMERGENCY MEDICINE

## 2017-07-14 PROCEDURE — 96361 HYDRATE IV INFUSION ADD-ON: CPT

## 2017-07-14 PROCEDURE — 96360 HYDRATION IV INFUSION INIT: CPT | Mod: 59

## 2017-07-14 PROCEDURE — 71260 CT THORAX DX C+: CPT

## 2017-07-14 PROCEDURE — 80053 COMPREHEN METABOLIC PANEL: CPT | Performed by: EMERGENCY MEDICINE

## 2017-07-14 PROCEDURE — 76705 ECHO EXAM OF ABDOMEN: CPT

## 2017-07-14 PROCEDURE — 83690 ASSAY OF LIPASE: CPT | Performed by: EMERGENCY MEDICINE

## 2017-07-14 PROCEDURE — 85025 COMPLETE CBC W/AUTO DIFF WBC: CPT | Performed by: EMERGENCY MEDICINE

## 2017-07-14 PROCEDURE — 84484 ASSAY OF TROPONIN QUANT: CPT

## 2017-07-14 PROCEDURE — 99285 EMERGENCY DEPT VISIT HI MDM: CPT | Mod: 25

## 2017-07-14 PROCEDURE — 85379 FIBRIN DEGRADATION QUANT: CPT | Performed by: EMERGENCY MEDICINE

## 2017-07-14 RX ORDER — LIDOCAINE 40 MG/G
CREAM TOPICAL
Status: DISCONTINUED | OUTPATIENT
Start: 2017-07-14 | End: 2017-07-14 | Stop reason: HOSPADM

## 2017-07-14 RX ORDER — IOPAMIDOL 755 MG/ML
500 INJECTION, SOLUTION INTRAVASCULAR ONCE
Status: COMPLETED | OUTPATIENT
Start: 2017-07-14 | End: 2017-07-14

## 2017-07-14 RX ORDER — ACETAMINOPHEN 500 MG
1000 TABLET ORAL ONCE
Status: COMPLETED | OUTPATIENT
Start: 2017-07-14 | End: 2017-07-14

## 2017-07-14 RX ORDER — ALUMINA, MAGNESIA, AND SIMETHICONE 2400; 2400; 240 MG/30ML; MG/30ML; MG/30ML
15 SUSPENSION ORAL ONCE
Status: COMPLETED | OUTPATIENT
Start: 2017-07-14 | End: 2017-07-14

## 2017-07-14 RX ADMIN — RANITIDINE 150 MG: 150 TABLET ORAL at 14:09

## 2017-07-14 RX ADMIN — IOPAMIDOL 65 ML: 755 INJECTION, SOLUTION INTRAVENOUS at 13:58

## 2017-07-14 RX ADMIN — ALUMINUM HYDROXIDE, MAGNESIUM HYDROXIDE, AND DIMETHICONE 15 ML: 400; 400; 40 SUSPENSION ORAL at 12:38

## 2017-07-14 RX ADMIN — ACETAMINOPHEN 1000 MG: 500 TABLET, FILM COATED ORAL at 13:23

## 2017-07-14 RX ADMIN — SODIUM CHLORIDE 86 ML: 9 INJECTION, SOLUTION INTRAVENOUS at 13:59

## 2017-07-14 RX ADMIN — LIDOCAINE HYDROCHLORIDE 15 ML: 20 SOLUTION ORAL; TOPICAL at 12:40

## 2017-07-14 RX ADMIN — SODIUM CHLORIDE 1000 ML: 9 INJECTION, SOLUTION INTRAVENOUS at 13:18

## 2017-07-14 ASSESSMENT — ENCOUNTER SYMPTOMS
SHORTNESS OF BREATH: 0
NAUSEA: 0

## 2017-07-14 NOTE — TELEPHONE ENCOUNTER
Carola advised pt go back to the ER for evaluation.   Kay advised to take pt to ER for evaluation. She verbalizes understanding and states she will take pt to ER.

## 2017-07-14 NOTE — ED NOTES
Pt does not speak english. From [place].   Daughter interpreting at this time, with pt's approval.

## 2017-07-14 NOTE — ED AVS SNAPSHOT
[facility] Emergency Department    [address]    [facility] [address]    Phone:  [phone]    Fax:  [phone]                                       Luis Yates   MRN: [id number]    Department:  [facility] Emergency Department   Date of Visit:  7/14/2017           After Visit Summary Signature Page     I have received my discharge instructions, and my questions have been answered. I have discussed any challenges I see with this plan with the nurse or doctor.    ..........................................................................................................................................  Patient/Patient Representative Signature      ..........................................................................................................................................  Patient Representative Print Name and Relationship to Patient    ..................................................               ................................................  Date                                            Time    ..........................................................................................................................................  Reviewed by Signature/Title    ...................................................              ..............................................  Date                                                            Time

## 2017-07-14 NOTE — DISCHARGE INSTRUCTIONS
Discharge Instructions  Hypertension - High Blood Pressure    During you visit to the Emergency Department, your blood pressure was higher than the recommended blood pressure.  This may be related to stress, pain, medication or other temporary conditions. In these cases, your blood pressure may return to normal on its own. If you have a history of high blood pressure, you may need to have your doctor adjust your medications. Sometimes, your high measurement here may indicate that you have developed high blood pressure that will stay high unless it is treated. Sudden very high blood pressure can cause problems, but usually high blood pressure causes problems over months to years.      Blood pressure is almost never lowered in the Emergency Department, because studies have shown that lowering blood pressure too quickly is much more dangerous than leaving it alone.    You need to follow up with your doctor in 1-3 days to get your blood pressure rechecked.     Return to the Emergency Department if you start to have:    A severe headache.    Chest pain.    Shortness of breath.    Weakness or numbness that affects one part of the body.    Confusion.    Vision changes.    Significant swelling of legs and/or eyes.    A reaction to any medication started in the Emergency Department.    What can I do to help myself?    Avoid alcohol.    Take any blood pressure medicine that you are prescribed.    Get a good night s sleep.    Lower your salt intake.    Exercise.    Lose weight.    Manage stress.    If blood pressure medication was started in the Emergency Department:    The medicine may not have an immediate effect. The body and brain determine what blood pressure you have. The medicine s job is to retrain the body s  thermostat  to a lower blood pressure.    You will need to follow up with your doctor to see how this medicine is working for you.  If you were given a prescription for medicine here today, be sure to read all of  the information (including the package insert) that comes with your prescription.  This will include important information about the medicine, its side effects, and any warnings that you need to know about.  The pharmacist who fills the prescription can provide more information and answer questions you may have about the medicine.  If you have questions or concerns that the pharmacist cannot address, please call or return to the Emergency Department.     Remember that you can always come back to the Emergency Department if you are not able to see your regular doctor in the amount of time listed above, if you get any new symptoms, or if there is anything that worries you.    Discharge Instructions  Chest Pain    You have been seen today for chest pain or discomfort.  At this time, your doctor has found no signs that your chest pain is due to a serious or life-threatening condition, (or you have declined more testing and/or admission to the hospital). However, sometimes there is a serious problem that does not show up right away. Your evaluation today may not be complete and you may need further testing and evaluation.     You need to follow-up with your regular doctor within 3 days.    Return to the Emergency Department if:    Your chest pain changes, gets worse, starts to happen more often, or comes with less activity.    You are short of breath.    You get very weak or tired.    You pass out or faint.    You have any new symptoms, like fever, cough, numb legs, or you cough up blood.    You have anything else that worries you.    Until you follow-up with your regular doctor please do the following:    Take one aspirin daily unless you have an allergy or are told not to by your doctor.    If a stress test appointment has been made, go to the appointment.    If you have questions, contact your regular doctor.    If your doctor today has told you to follow-up with your regular doctor, it is very important that you make  an appointment with your clinic and go to the appointment.  If you do not follow-up with your primary doctor, it may result in missing an important development which could result in permanent injury or disability and/or lasting pain.  If there is any problem keeping your appointment, call your doctor or return to the Emergency Department.    If you were given a prescription for medicine here today, be sure to read all of the information (including the package insert) that comes with your prescription.  This will include important information about the medicine, its side effects, and any warnings that you need to know about.  The pharmacist who fills the prescription can provide more information and answer questions you may have about the medicine.  If you have questions or concerns that the pharmacist cannot address, please call or return to the Emergency Department.     Remember that you can always come back to the Emergency Department if you are not able to see your regular doctor in the amount of time listed above, if you get any new symptoms, or if there is anything that worries

## 2017-07-14 NOTE — ED NOTES
HPI:  Pt's daughter took pt's B/P after pt had experience vision disturbances. No syncope.  Pt has been having intermittent chest pain.  Pt was in ED for chest discomfort couple days ago.

## 2017-07-14 NOTE — ED NOTES
Pt resting Comfortably in bed.  Alert & Oriented , per daughter.  Pt C/O intermittent chest discomfort.

## 2017-07-14 NOTE — ED PROVIDER NOTES
History     Chief Complaint:  Hypertension and Chest Pain      HPI   Luis Yates is a 57 year old male who presents with chest pain. Since the patient's visit 2 days ago he has been doing well until this morning at 0700 when he had an episode of chest pain and blurry vision while taking a shower. He describes his chest discomfort as an intermittent heaviness with associated blurry vision that lasted for a few seconds. The patient was also feeling more fatigued and told his daughter of his symptoms and they checked his blood pressure and noted it to be elevated. They called clinic who advised them to visit the emergency department for further evaluation and treatment. Currently the patient's chest discomfort now feels more like a burning sensation which is also present after eating, located in his upper abdomen. The patient is active and feels that walking up stairs bring on his symptoms at time. The patient was seen here in the emergency department 2 days ago for similar symptoms including vision blurriness and the patient feels his blurriness has improved. The patient denies shortness of breath or hemoptysis. The patient denies leg pain/swelling, nausea, or any associated symptoms. The patient's Cozaar was recently doubled to 15mg daily.     The patient was offered formal interpretor services but felt comfortable with his daughter translating.     Allergies:  The patient has no known allergies to medications.      Medications:    Cozaar  Norco  Atenolol  Tenormin  Albuterol  Colchicine    Past Medical History:    Hypertension  Gout  Hypercholesteremia     Past Surgical History:    Hernia repair    Family History:    The patient has no pertinent family history.     Social History:  .  The patient denies smoking.   The patient denies alcohol consumption.      Review of Systems   Respiratory: Negative for shortness of breath.    Cardiovascular: Positive for chest pain. Negative for leg swelling.    Gastrointestinal: Negative for nausea.   All other systems reviewed and are negative.    Physical Exam     Patient Vitals for the past 24 hrs:   BP Temp Temp src Pulse Heart Rate Resp SpO2 Weight   07/14/17 1341 (!) 182/94 - - - - - 98 % -   07/14/17 1330 (!) 191/92 - - - - - 98 % -   07/14/17 1314 (!) 194/93 - - - - - - -   07/14/17 1245 180/88 - - - 58 - 97 % -   07/14/17 1230 (!) 177/94 - - - 62 - 98 % -   07/14/17 1217 (!) 191/93 - - - - - 100 % -   07/14/17 1206 (!) 207/102 98.1  F (36.7  C) Oral 71 - 16 - 69.4 kg (153 lb)           Physical Exam  General: adult male sitting upright  Eyes: PERRL, Conjunctive within normal limits  ENT: Moist mucous membranes, oropharynx clear.   CV: Normal S1S2, no murmur, rub or gallop. Regular rate and rhythm  Resp: Clear to auscultation bilaterally, no wheezes, rales or rhonchi. Normal respiratory effort.  GI: Abdomen is soft and nondistended. No palpable masses. No rebound or guarding. Mild tenderness to the epigastrium and ruq.  MSK: No edema. Nontender. Normal active range of motion.  Skin: Warm and dry. No rashes or lesions or ecchymoses on visible skin.  Neuro: Alert and oriented. Responds appropriately to all questions and commands. No focal findings appreciated. Normal muscle tone.  Psych: Normal mood and affect. Pleasant.      Emergency Department Course   ECG:  Completed at 1202.  Read at 1204.   Rate 72 bpm. VT interval 154. QRS duration 78. QT/QTc 376/411. P-R-T axes 75 38 34. normal sinus rhythm, normal ECG     Completed at 1417.  Read at 1418.   Rate 69 bpm. VT interval 152. QRS duration 74. QT/QTc 384/411. P-R-T axes 78 38 37. normal sinus rhythm, normal ECG     Imaging:  Radiographic findings were communicated with the patient who voiced understanding of the findings.    US Abdomen coleman RUQ:   Normal right upper quadrant ultrasound. No gallstones or  bile duct dilatation. Pancreas obscured by bowel gas.  Results per radiology.     CT Chest IV contrast PE  protocol:  IMPRESSION:  1. No evidence of pulmonary embolism. Thoracic aorta is unremarkable.  2. Indeterminate 0.5 cm right lower lobe lung nodule.  3. Evidence of old granulomatous disease and probable fibroatelectasis  right middle lobe causing mild, probable chronic, right lung volume  loss.    Recommendations for one or multiple incidental lung nodules < or = 6mm  :    Low risk patients: No routine follow-up.    High risk patients: Optional follow-up CT at 12 months; if  unchanged, no further follow-up.    *Low Risk: Minimal or absent history of smoking or other known risk  factors.  *Nonsolid (ground glass) or partly solid nodules may require longer  follow-up to exclude indolent adenocarcinoma.  *Recommendations based on Guidelines for the Management of Incidental  Pulmonary Nodules Detected at CT: From the Fleischner Society 2017,  Radiology 2017.    SABRINA RUIZ MD  Results per radiology.     Laboratory:  CBC: HGB 12.6 (L), ow wnl (WBC 6.9, )  CMP:  (H), Creat 0.60 (L), Calc 8.4 (L), ow wnl   Lipase: 195  D-dimer: 2.1 (H)   BNP: 146    Interventions:  GI cocktail 30 mL  0.9% sodium chloride infusion 1000 mL  Zantac 150 mg po  Tylenol 1000 mg tablet    Emergency Department Course:  Nursing notes and vitals reviewed.  I performed an exam of the patient as documented above.     The work up above was undertaken.     The patient received the intervention(s) above. He reports resolution of his symptoms after GI cocktail.    Findings and plan explained to the Patient. Patient discharged home with instructions regarding supportive care, medications, and reasons to return. The importance of close follow-up was reviewed.     Impression & Plan    Medical Decision Making:  Luis Yates is a 57 year old male with history of difficulty controlling his blood pressures who presents to the emergency department with concerns for symptoms while showing which included blurry vision and chest pain as well as  elevation of blood pressure at the time. When he presented to the emergency department his blood pressure was elevated but not malignantly so.He noted his chest pain was now a burning but has been constant since 7AM which was nearly 5.5 hours after. His chest discomfort resolved with GI cocktail here. He is awaiting outpatient endoscopy and colonoscopy as it is suspected he has GERD, gastritis or peptic ulcer disease. Thorough evaluation was undertaken here in the emergency department including serial troponin's, serial EKGs, and other cardiopulmonary screening. He had an elevated d-dimer at 2.1 and therefore CT chest was obtained. CT results showed no evidence of acute worrisome pathology. Incidental pulmonary nodule can be followed as an outpatient and was discussed with the patient. No other worrisome findings and on reassessment he notes he is feeling well. His blood pressure continued to stay elevated here in the emergency department although improved. Just 2 days ago his blood pressure medication regiment was changed and requires time to determine its efficacy. He should follow up in 3 days with his PCP for reassessment. I do not feel he requires admission. This seems unlikely to represent acute life threatening event. Recommended ranitidine on a daily basis and Maalox as it helped him here.  Return immediately for worsening symptoms. All questions were answered and he was discharged home.     Diagnosis:    ICD-10-CM    1. Poorly controlled blood pressure I99.8    2. Chest pain, unspecified type R07.9    3. Abdominal pain, epigastric R10.13    4. Pulmonary nodule, right R91.1        Disposition:  Discharged.    Discharge Medications:  New Prescriptions    RANITIDINE (ZANTAC) 150 MG TABLET    Take 1 tablet (150 mg) by mouth 2 times daily for 10 days         Damon CHAMPAGNE, am serving as a scribe at 2:18 PM on 7/14/2017 to document services personally performed by Dr. Moreau, based on my observations and the  provider's statements to me.    [facility] EMERGENCY DEPARTMENT       Jocelyn Moreau MD  07/15/17 0638

## 2017-07-14 NOTE — ED AVS SNAPSHOT
[facility] Emergency Department    [address] [facility] [address]    Phone:  [phone]    Fax:  [phone]                                       Luis Yates   MRN: [id number]    Department:  [facility] Emergency Department   Date of Visit:  7/14/2017           Patient Information     Date Of Birth          1960        Your diagnoses for this visit were:     Poorly controlled blood pressure     Chest pain, unspecified type     Abdominal pain, epigastric     Pulmonary nodule, right        You were seen by Jocelyn Moreau MD and Crispin Patton MD.      Follow-up Information     Follow up with Carola Grullon APRN CNP.    Specialty:  Nurse Practitioner - Family    Why:  within 3 days for reassessment    Contact information:    [facility]  [address] [facility] [address]  [phone]          Follow up with [facility] Emergency [facility].    Specialty:  EMERGENCY MEDICINE    Why:  As needed, If symptoms worsen    Contact information:    [address] [facility] [id number] [phone]        Discharge Instructions       Discharge Instructions  Hypertension - High Blood Pressure    During you visit to the Emergency Department, your blood pressure was higher than the recommended blood pressure.  This may be related to stress, pain, medication or other temporary conditions. In these cases, your blood pressure may return to normal on its own. If you have a history of high blood pressure, you may need to have your doctor adjust your medications. Sometimes, your high measurement here may indicate that you have developed high blood pressure that will stay high unless it is treated. Sudden very high blood pressure can cause problems, but usually high blood pressure causes problems over months to years.      Blood pressure is almost never lowered in the Emergency Department, because studies  have shown that lowering blood pressure too quickly is much more dangerous than leaving it alone.    You need to follow up with your doctor in 1-3 days to get your blood pressure rechecked.     Return to the Emergency Department if you start to have:    A severe headache.    Chest pain.    Shortness of breath.    Weakness or numbness that affects one part of the body.    Confusion.    Vision changes.    Significant swelling of legs and/or eyes.    A reaction to any medication started in the Emergency Department.    What can I do to help myself?    Avoid alcohol.    Take any blood pressure medicine that you are prescribed.    Get a good night s sleep.    Lower your salt intake.    Exercise.    Lose weight.    Manage stress.    If blood pressure medication was started in the Emergency Department:    The medicine may not have an immediate effect. The body and brain determine what blood pressure you have. The medicine s job is to retrain the body s  thermostat  to a lower blood pressure.    You will need to follow up with your doctor to see how this medicine is working for you.  If you were given a prescription for medicine here today, be sure to read all of the information (including the package insert) that comes with your prescription.  This will include important information about the medicine, its side effects, and any warnings that you need to know about.  The pharmacist who fills the prescription can provide more information and answer questions you may have about the medicine.  If you have questions or concerns that the pharmacist cannot address, please call or return to the Emergency Department.     Remember that you can always come back to the Emergency Department if you are not able to see your regular doctor in the amount of time listed above, if you get any new symptoms, or if there is anything that worries you.    Discharge Instructions  Chest Pain    You have been seen today for chest pain or discomfort.   At this time, your doctor has found no signs that your chest pain is due to a serious or life-threatening condition, (or you have declined more testing and/or admission to the hospital). However, sometimes there is a serious problem that does not show up right away. Your evaluation today may not be complete and you may need further testing and evaluation.     You need to follow-up with your regular doctor within 3 days.    Return to the Emergency Department if:    Your chest pain changes, gets worse, starts to happen more often, or comes with less activity.    You are short of breath.    You get very weak or tired.    You pass out or faint.    You have any new symptoms, like fever, cough, numb legs, or you cough up blood.    You have anything else that worries you.    Until you follow-up with your regular doctor please do the following:    Take one aspirin daily unless you have an allergy or are told not to by your doctor.    If a stress test appointment has been made, go to the appointment.    If you have questions, contact your regular doctor.    If your doctor today has told you to follow-up with your regular doctor, it is very important that you make an appointment with your clinic and go to the appointment.  If you do not follow-up with your primary doctor, it may result in missing an important development which could result in permanent injury or disability and/or lasting pain.  If there is any problem keeping your appointment, call your doctor or return to the Emergency Department.    If you were given a prescription for medicine here today, be sure to read all of the information (including the package insert) that comes with your prescription.  This will include important information about the medicine, its side effects, and any warnings that you need to know about.  The pharmacist who fills the prescription can provide more information and answer questions you may have about the medicine.  If you have  questions or concerns that the pharmacist cannot address, please call or return to the Emergency Department.     Remember that you can always come back to the Emergency Department if you are not able to see your regular doctor in the amount of time listed above, if you get any new symptoms, or if there is anything that worries         Discharge References/Attachments     EPIGASTRIC PAIN (UNCERTAIN CAUSE) (ENGLISH)      Future Appointments        Provider Department Dept Phone Center    8/1/2017 7:00 AM PAYAL [facility] [phone] [place]      24 Hour Appointment Hotline       To make an appointment at any [facility], call [phone] ([phone]). If you don't have a family doctor or clinic, we will help you find one. [facility] are conveniently located to serve the needs of you and your family.             Review of your medicines      START taking        Dose / Directions Last dose taken    ranitidine 150 MG tablet   Commonly known as:  ZANTAC   Dose:  150 mg   Quantity:  20 tablet        Take 1 tablet (150 mg) by mouth 2 times daily for 10 days   Refills:  0          Our records show that you are taking the medicines listed below. If these are incorrect, please call your family doctor or clinic.        Dose / Directions Last dose taken    * albuterol 108 (90 BASE) MCG/ACT Inhaler   Commonly known as:  PROAIR HFA/PROVENTIL HFA/VENTOLIN HFA   Dose:  2 puff        Inhale 2 puffs into the lungs every 6 hours   Refills:  0        * albuterol (2.5 MG/3ML) 0.083% neb solution   Dose:  1 vial   Quantity:  50 vial        Take 1 vial (2.5 mg) by nebulization every 6 hours as needed for shortness of breath / dyspnea or wheezing   Refills:  1        atenolol 50 MG tablet   Commonly known as:  TENORMIN   Dose:  100 mg   Quantity:  180 tablet        Take 2 tablets (100 mg) by mouth daily   Refills:  1        COLCRYS PO   Dose:  0.6 mg        Take 0.6 mg by mouth daily    Refills:  0        HYDROcodone-acetaminophen 5-325 MG per tablet   Commonly known as:  NORCO   Dose:  1-2 tablet   Quantity:  30 tablet        Take 1-2 tablets by mouth every 4 hours as needed for other (Moderate to Severe Pain)   Refills:  0        losartan 50 MG tablet   Commonly known as:  COZAAR   Dose:  50 mg   Quantity:  90 tablet        Take 1 tablet (50 mg) by mouth daily   Refills:  1        order for DME   Quantity:  1 Device        Neb machine and tubing   Refills:  prn        * Notice:  This list has 2 medication(s) that are the same as other medications prescribed for you. Read the directions carefully, and ask your doctor or other care provider to review them with you.            Prescriptions were sent or printed at these locations (1 Prescription)                   Other Prescriptions                Printed at Department/Unit printer (1 of 1)         ranitidine (ZANTAC) 150 MG tablet                Procedures and tests performed during your visit     Procedure/Test Number of Times Performed    Abdomen US, limited (RUQ only) 1    CBC with platelets differential 1    Chest CT, IV contrast only - PE protocol 1    Comprehensive metabolic panel 1    D dimer quantitative 1    EKG 12 lead 2    Lipase 1    Nt probnp inpatient (BNP) 1    Troponin I 1    Troponin POCT 1      Orders Needing Specimen Collection     None      Pending Results     Date and Time Order Name Status Description    7/14/2017 1502 EKG 12 lead Preliminary             Pending Culture Results     No orders found from 7/12/2017 to 7/15/2017.            Pending Results Instructions     If you had any lab results that were not finalized at the time of your Discharge, you can call the ED Lab Result RN at [phone]. You will be contacted by this team for any positive Lab results or changes in treatment. The nurses are available 7 days a week from 10A to 6:30P.  You can leave a message 24 hours per day and they will return your call.         Test Results From Your Hospital Stay        7/14/2017 12:42 PM      Component Results     Component Value Ref Range & Units Status    WBC 6.9 4.0 - 11.0 10e9/L Final    RBC Count 4.02 (L) 4.4 - 5.9 10e12/L Final    Hemoglobin 12.6 (L) 13.3 - 17.7 g/dL Final    Hematocrit 39.0 (L) 40.0 - 53.0 % Final    MCV 97 78 - 100 fl Final    MCH 31.3 26.5 - 33.0 pg Final    MCHC 32.3 31.5 - 36.5 g/dL Final    RDW 12.8 10.0 - 15.0 % Final    Platelet Count 212 150 - 450 10e9/L Final    Diff Method Automated Method  Final    % Neutrophils 44.9 % Final    % Lymphocytes 31.7 % Final    % Monocytes 7.3 % Final    % Eosinophils 15.4 % Final    % Basophils 0.6 % Final    % Immature Granulocytes 0.1 % Final    Nucleated RBCs 0 0 /100 Final    Absolute Neutrophil 3.1 1.6 - 8.3 10e9/L Final    Absolute Lymphocytes 2.2 0.8 - 5.3 10e9/L Final    Absolute Monocytes 0.5 0.0 - 1.3 10e9/L Final    Absolute Eosinophils 1.1 (H) 0.0 - 0.7 10e9/L Final    Absolute Basophils 0.0 0.0 - 0.2 10e9/L Final    Abs Immature Granulocytes 0.0 0 - 0.4 10e9/L Final    Absolute Nucleated RBC 0.0  Final         7/14/2017  1:06 PM      Component Results     Component Value Ref Range & Units Status    Sodium 136 133 - 144 mmol/L Final    Potassium 3.5 3.4 - 5.3 mmol/L Final    Chloride 103 94 - 109 mmol/L Final    Carbon Dioxide 28 20 - 32 mmol/L Final    Anion Gap 5 3 - 14 mmol/L Final    Glucose 107 (H) 70 - 99 mg/dL Final    Urea Nitrogen 11 7 - 30 mg/dL Final    Creatinine 0.60 (L) 0.66 - 1.25 mg/dL Final    GFR Estimate >90  Non  GFR Calc   >60 mL/min/1.7m2 Final    GFR Estimate If Black >90   GFR Calc   >60 mL/min/1.7m2 Final    Calcium 8.4 (L) 8.5 - 10.1 mg/dL Final    Bilirubin Total 0.4 0.2 - 1.3 mg/dL Final    Albumin 3.7 3.4 - 5.0 g/dL Final    Protein Total 7.2 6.8 - 8.8 g/dL Final    Alkaline Phosphatase 55 40 - 150 U/L Final    ALT 21 0 - 70 U/L Final    AST 22 0 - 45 U/L Final         7/14/2017  1:04 PM       Component Results     Component Value Ref Range & Units Status    Lipase 195 73 - 393 U/L Final         7/14/2017 12:52 PM      Component Results     Component Value Ref Range & Units Status    D Dimer 2.1 (H) 0.0 - 0.50 ug/ml FEU Final    This D-dimer assay is intended for use in conjunction with a clinical pretest   probability assessment model to exclude pulmonary embolism (PE) and deep   venous   thrombosis (DVT) in outpatients suspected of PE or DVT. The cut-off value is   0.5 ug/mL FEU.           7/14/2017  1:06 PM      Component Results     Component Value Ref Range & Units Status    Troponin I ES  0.000 - 0.045 ug/L Final    <0.015  The 99th percentile for upper reference range is 0.045 ug/L.  Troponin values in   the range of 0.045 - 0.120 ug/L may be associated with risks of adverse   clinical events.           7/14/2017  1:06 PM      Component Results     Component Value Ref Range & Units Status    N-Terminal Pro BNP Inpatient 146 0 - 900 pg/mL Final    Reference range shown and results flagged as abnormal are suggested inpatient   cut points for confirming diagnosis if CHF in an acute setting. Establishing   a   baseline value for each individual patient is useful for follow-up. An   inpatient or emergency department NT-proPBNP <300 pg/mL effectively rules out   acute CHF, with 99% negative predictive value.  The outpatient non-acute reference range for ruling out CHF is:   0-125 pg/mL (age 18 to less than 75)   0-450 pg/mL (age 75 yrs and older)           7/14/2017  1:25 PM      Narrative     ULTRASOUND ABDOMEN LIMITED 7/14/2017 1:09 PM     HISTORY: Abdominal pain.     FINDINGS:  Liver is normal in echogenicity without focal lesions. The  gallbladder is normal without stones or sludge. Common bile duct is  normal in diameter. Pancreas is obscured by bowel gas. Examination of  the right kidney is unremarkable.        Impression     IMPRESSION:  Normal right upper quadrant ultrasound. No gallstones  or  bile duct dilatation. Pancreas obscured by bowel gas.    SABRINA RUIZ MD         7/14/2017  2:40 PM      Narrative     CT CHEST PULMONARY EMBOLISM WITH CONTRAST 7/14/2017 2:05 PM     HISTORY: Chest pain     TECHNIQUE: Thin section axial images are performed from the thoracic  inlet to the lung bases utilizing 65 mL of Isovue 370 IV contrast  without adverse event. Coronal reformatted images are also generated.  Radiation dose for this scan was reduced using automated exposure  control, adjustment of the mA and/or kV according to patient size, or  iterative reconstruction technique.    FINDINGS:     Chest: Area of fibroatelectasis right middle lobe is present.  Calcified granulomas noted within this area of fibroatelectasis. There  is slight volume loss in the right hemithorax. On series 8, image 97,  there is a 0.5 cm lateral right lung base nodule which is  indeterminate. Lungs are otherwise clear. No pleural or pericardial  fluid. Heart is normal in size. Esophagus demonstrates mild  circumferential wall thickening throughout its extent without focal  nodularity. Findings could reflect esophagitis. Thyroid gland appears  normal in size. No enlarged lymph nodes. There is no evidence of  pulmonary embolism. Thoracic aorta is unremarkable. Only a few  scattered calcified plaques are present. Limited images upper abdomen  are within normal limits. Bone window examination is unremarkable. No  aggressive-appearing bone lesions.        Impression     IMPRESSION:  1. No evidence of pulmonary embolism. Thoracic aorta is unremarkable.  2. Indeterminate 0.5 cm right lower lobe lung nodule.  3. Evidence of old granulomatous disease and probable fibroatelectasis  right middle lobe causing mild, probable chronic, right lung volume  loss.    Recommendations for one or multiple incidental lung nodules < or = 6mm  :    Low risk patients: No routine follow-up.    High risk patients: Optional follow-up CT at 12 months;  if  unchanged, no further follow-up.    *Low Risk: Minimal or absent history of smoking or other known risk  factors.  *Nonsolid (ground glass) or partly solid nodules may require longer  follow-up to exclude indolent adenocarcinoma.  *Recommendations based on Guidelines for the Management of Incidental  Pulmonary Nodules Detected at CT: From the Fleischner Society 2017,  Radiology 2017.    SABRINA RUIZ MD         7/14/2017  3:06 PM      Component Results     Component Value Ref Range & Units Status    Troponin I 0.00 0.00 - 0.10 ug/L Final                Clinical Quality Measure: Blood Pressure Screening     Your blood pressure was checked while you were in the emergency department today. The last reading we obtained was  BP: (!) 183/92 . Please read the guidelines below about what these numbers mean and what you should do about them.  If your systolic blood pressure (the top number) is less than 120 and your diastolic blood pressure (the bottom number) is less than 80, then your blood pressure is normal. There is nothing more that you need to do about it.  If your systolic blood pressure (the top number) is 120-139 or your diastolic blood pressure (the bottom number) is 80-89, your blood pressure may be higher than it should be. You should have your blood pressure rechecked within a year by a primary care provider.  If your systolic blood pressure (the top number) is 140 or greater or your diastolic blood pressure (the bottom number) is 90 or greater, you may have high blood pressure. High blood pressure is treatable, but if left untreated over time it can put you at risk for heart attack, stroke, or kidney failure. You should have your blood pressure rechecked by a primary care provider within the next 4 weeks.  If your provider in the emergency department today gave you specific instructions to follow-up with your doctor or provider even sooner than that, you should follow that instruction and not wait for up to  "4 weeks for your follow-up visit.        Thank you for choosing [facility]       Thank you for choosing [facility] for your care. Our goal is always to provide you with excellent care. Hearing back from our patients is one way we can continue to improve our services. Please take a few minutes to complete the written survey that you may receive in the mail after you visit with us. Thank you!        [employer] Information     [employer] lets you send messages to your doctor, view your test results, renew your prescriptions, schedule appointments and more. To sign up, go to [email or web address] . Click on \"Log in\" on the left side of the screen, which will take you to the Welcome page. Then click on \"Sign up Now\" on the right side of the page.     You will be asked to enter the access code listed below, as well as some personal information. Please follow the directions to create your username and password.     Your access code is: [email or web address]  Expires: 2017  9:27 AM     Your access code will  in 90 days. If you need help or a new code, please call your [facility] clinic or [phone].        Care EveryWhere ID     This is your Care EveryWhere ID. This could be used by other organizations to access your [facility] medical records  [id number]        Equal Access to Services     LILIANA KENDALL : Abbie Chavira, waaxda luqadaha, qaybta kaalmada adesureshyajoanie, [place] orr. So [facility] [phone].    ATENCIÓN: Si habla español, tiene a urban disposición servicios gratuitos de asistencia lingüística. Llame al [phone].    We comply with applicable federal civil rights laws and [place] laws. We do not discriminate on the basis of race, color, national origin, age, disability sex, sexual orientation or gender identity.            After Visit Summary       This is your record. Keep this with you and show to your community pharmacist(s) and doctor(s) at your next visit.                "

## 2017-07-14 NOTE — TELEPHONE ENCOUNTER
Pt's daughter Tsion calls. Pt seen yesterday by Carola and told to call today if still having chest pain and high blood pressure. She states that pt developed blurred vision while showering today, he stopped his shower and she checked his BP. BP was around 197 and pt now having chest pain and feeling tired. Pt does not have any other symptoms.     Please call back with recommendations.

## 2017-07-16 ENCOUNTER — APPOINTMENT (OUTPATIENT)
Dept: CT IMAGING | Facility: CLINIC | Age: 57
End: 2017-07-16
Attending: EMERGENCY MEDICINE
Payer: COMMERCIAL

## 2017-07-16 ENCOUNTER — HOSPITAL ENCOUNTER (EMERGENCY)
Facility: CLINIC | Age: 57
Discharge: HOME | End: 2017-07-16
Attending: EMERGENCY MEDICINE | Admitting: EMERGENCY MEDICINE
Payer: COMMERCIAL

## 2017-07-16 ENCOUNTER — APPOINTMENT (OUTPATIENT)
Dept: GENERAL RADIOLOGY | Facility: CLINIC | Age: 57
End: 2017-07-16
Attending: EMERGENCY MEDICINE
Payer: COMMERCIAL

## 2017-07-16 VITALS
RESPIRATION RATE: 18 BRPM | TEMPERATURE: 98.4 F | OXYGEN SATURATION: 99 % | HEART RATE: 67 BPM | SYSTOLIC BLOOD PRESSURE: 168 MMHG | DIASTOLIC BLOOD PRESSURE: 96 MMHG

## 2017-07-16 DIAGNOSIS — R20.2 PARESTHESIAS: ICD-10-CM

## 2017-07-16 DIAGNOSIS — I16.0 HYPERTENSIVE URGENCY: ICD-10-CM

## 2017-07-16 DIAGNOSIS — R91.8 PULMONARY NODULES: ICD-10-CM

## 2017-07-16 LAB
ALBUMIN SERPL-MCNC: 3.7 G/DL (ref 3.4–5)
ALBUMIN UR-MCNC: NEGATIVE MG/DL
ALP SERPL-CCNC: 56 U/L (ref 40–150)
ALT SERPL W P-5'-P-CCNC: 21 U/L (ref 0–70)
ANION GAP SERPL CALCULATED.3IONS-SCNC: 3 MMOL/L (ref 3–14)
APPEARANCE UR: CLEAR
AST SERPL W P-5'-P-CCNC: 17 U/L (ref 0–45)
BASOPHILS # BLD AUTO: 0 10E9/L (ref 0–0.2)
BASOPHILS NFR BLD AUTO: 0.6 %
BILIRUB SERPL-MCNC: 0.3 MG/DL (ref 0.2–1.3)
BILIRUB UR QL STRIP: NEGATIVE
BUN SERPL-MCNC: 9 MG/DL (ref 7–30)
CALCIUM SERPL-MCNC: 8.5 MG/DL (ref 8.5–10.1)
CHLORIDE SERPL-SCNC: 104 MMOL/L (ref 94–109)
CO2 SERPL-SCNC: 33 MMOL/L (ref 20–32)
COLOR UR AUTO: COLORLESS
CREAT SERPL-MCNC: 0.88 MG/DL (ref 0.66–1.25)
DIFFERENTIAL METHOD BLD: ABNORMAL
EOSINOPHIL # BLD AUTO: 1 10E9/L (ref 0–0.7)
EOSINOPHIL NFR BLD AUTO: 15.7 %
ERYTHROCYTE [DISTWIDTH] IN BLOOD BY AUTOMATED COUNT: 13 % (ref 10–15)
ETHANOL SERPL-MCNC: <0.01 G/DL
GFR SERPL CREATININE-BSD FRML MDRD: 88 ML/MIN/1.7M2
GLUCOSE BLDC GLUCOMTR-MCNC: 100 MG/DL (ref 70–99)
GLUCOSE SERPL-MCNC: 104 MG/DL (ref 70–99)
GLUCOSE UR STRIP-MCNC: NEGATIVE MG/DL
HCT VFR BLD AUTO: 39.5 % (ref 40–53)
HGB BLD-MCNC: 13.1 G/DL (ref 13.3–17.7)
HGB UR QL STRIP: NEGATIVE
IMM GRANULOCYTES # BLD: 0 10E9/L (ref 0–0.4)
IMM GRANULOCYTES NFR BLD: 0.2 %
INR PPP: 1.14 (ref 0.86–1.14)
KETONES UR STRIP-MCNC: NEGATIVE MG/DL
LEUKOCYTE ESTERASE UR QL STRIP: NEGATIVE
LIPASE SERPL-CCNC: 240 U/L (ref 73–393)
LYMPHOCYTES # BLD AUTO: 2 10E9/L (ref 0.8–5.3)
LYMPHOCYTES NFR BLD AUTO: 31.6 %
MCH RBC QN AUTO: 32.1 PG (ref 26.5–33)
MCHC RBC AUTO-ENTMCNC: 33.2 G/DL (ref 31.5–36.5)
MCV RBC AUTO: 97 FL (ref 78–100)
MONOCYTES # BLD AUTO: 0.5 10E9/L (ref 0–1.3)
MONOCYTES NFR BLD AUTO: 8.3 %
NEUTROPHILS # BLD AUTO: 2.8 10E9/L (ref 1.6–8.3)
NEUTROPHILS NFR BLD AUTO: 43.6 %
NITRATE UR QL: NEGATIVE
NRBC # BLD AUTO: 0 10*3/UL
NRBC BLD AUTO-RTO: 0 /100
PH UR STRIP: 7 PH (ref 5–7)
PLATELET # BLD AUTO: 205 10E9/L (ref 150–450)
POTASSIUM SERPL-SCNC: 3.7 MMOL/L (ref 3.4–5.3)
PROT SERPL-MCNC: 7.2 G/DL (ref 6.8–8.8)
RBC # BLD AUTO: 4.08 10E12/L (ref 4.4–5.9)
RBC #/AREA URNS AUTO: 0 /HPF (ref 0–2)
SODIUM SERPL-SCNC: 140 MMOL/L (ref 133–144)
SP GR UR STRIP: 1 (ref 1–1.03)
TROPONIN I BLD-MCNC: 0 UG/L (ref 0–0.1)
URN SPEC COLLECT METH UR: NORMAL
UROBILINOGEN UR STRIP-MCNC: 0 MG/DL (ref 0–2)
WBC # BLD AUTO: 6.3 10E9/L (ref 4–11)
WBC #/AREA URNS AUTO: 1 /HPF (ref 0–2)

## 2017-07-16 PROCEDURE — 70450 CT HEAD/BRAIN W/O DYE: CPT

## 2017-07-16 PROCEDURE — 74176 CT ABD & PELVIS W/O CONTRAST: CPT

## 2017-07-16 PROCEDURE — 25000132 ZZH RX MED GY IP 250 OP 250 PS 637: Performed by: EMERGENCY MEDICINE

## 2017-07-16 PROCEDURE — 80320 DRUG SCREEN QUANTALCOHOLS: CPT | Performed by: EMERGENCY MEDICINE

## 2017-07-16 PROCEDURE — 85025 COMPLETE CBC W/AUTO DIFF WBC: CPT | Performed by: EMERGENCY MEDICINE

## 2017-07-16 PROCEDURE — 93005 ELECTROCARDIOGRAM TRACING: CPT

## 2017-07-16 PROCEDURE — 00000146 ZZHCL STATISTIC GLUCOSE BY METER IP

## 2017-07-16 PROCEDURE — 80053 COMPREHEN METABOLIC PANEL: CPT | Performed by: EMERGENCY MEDICINE

## 2017-07-16 PROCEDURE — 85610 PROTHROMBIN TIME: CPT | Performed by: EMERGENCY MEDICINE

## 2017-07-16 PROCEDURE — 99285 EMERGENCY DEPT VISIT HI MDM: CPT | Mod: 25

## 2017-07-16 PROCEDURE — 81001 URINALYSIS AUTO W/SCOPE: CPT | Mod: XU | Performed by: EMERGENCY MEDICINE

## 2017-07-16 PROCEDURE — 84484 ASSAY OF TROPONIN QUANT: CPT

## 2017-07-16 PROCEDURE — 83690 ASSAY OF LIPASE: CPT | Performed by: EMERGENCY MEDICINE

## 2017-07-16 PROCEDURE — 71020 XR CHEST 2 VW: CPT

## 2017-07-16 RX ORDER — LIDOCAINE 40 MG/G
CREAM TOPICAL
Status: DISCONTINUED | OUTPATIENT
Start: 2017-07-16 | End: 2017-07-16 | Stop reason: HOSPADM

## 2017-07-16 RX ORDER — HYDROCHLOROTHIAZIDE 12.5 MG/1
12.5 CAPSULE ORAL ONCE
Status: COMPLETED | OUTPATIENT
Start: 2017-07-16 | End: 2017-07-16

## 2017-07-16 RX ORDER — ACETAMINOPHEN 500 MG
500 TABLET ORAL EVERY 4 HOURS PRN
Status: DISCONTINUED | OUTPATIENT
Start: 2017-07-16 | End: 2017-07-16 | Stop reason: HOSPADM

## 2017-07-16 RX ORDER — HYDROCHLOROTHIAZIDE 12.5 MG/1
12.5 TABLET ORAL DAILY
Qty: 4 TABLET | Refills: 0 | Status: SHIPPED | OUTPATIENT
Start: 2017-07-16 | End: 2017-07-18 | Stop reason: ALTCHOICE

## 2017-07-16 RX ADMIN — HYDROCHLOROTHIAZIDE 12.5 MG: 12.5 CAPSULE ORAL at 18:05

## 2017-07-16 RX ADMIN — ACETAMINOPHEN 500 MG: 500 TABLET, FILM COATED ORAL at 18:05

## 2017-07-16 ASSESSMENT — ENCOUNTER SYMPTOMS
ABDOMINAL PAIN: 1
WEAKNESS: 0
NUMBNESS: 1
HEMATURIA: 0
EYE PAIN: 1
ARTHRALGIAS: 1
NAUSEA: 0
DIARRHEA: 0
HEADACHES: 0
VOMITING: 0
DYSURIA: 0
SHORTNESS OF BREATH: 0
LIGHT-HEADEDNESS: 0
FEVER: 0

## 2017-07-16 NOTE — LETTER
[facility] EMERGENCY DEPARTMENT  [address]  [facility] [address]  [phone]      July 16, 2017      To Whom it may concern:    Rajesh Hartmann was in our Emergency Department today, July 16, 2017, with a patient who needed their assistance.  Please excuse them from work/school.      Sincerely,    Jocelyn Hernandez RN

## 2017-07-16 NOTE — ED AVS SNAPSHOT
[facility] Emergency Department    [address] [facility] [address]    Phone:  [phone]    Fax:  [phone]                                       Luis Yates   MRN: [id number]    Department:  [facility] Emergency Department   Date of Visit:  7/16/2017           Patient Information     Date Of Birth          1960        Your diagnoses for this visit were:     Hypertensive urgency     Paresthesias     Pulmonary nodules        You were seen by Fredy Epstein MD.      Follow-up Information     Follow up with Carola Grullon APRN CNP. Schedule an appointment as soon as possible for a visit in 2 days.    Specialty:  Nurse Practitioner - Family    Contact information:    [facility]  [address] [facility] [address]  [phone]          Discharge Instructions       Discharge Instructions  Hypertension - High Blood Pressure    During you visit to the Emergency Department, your blood pressure was higher than the recommended blood pressure.  This may be related to stress, pain, medication or other temporary conditions. In these cases, your blood pressure may return to normal on its own. If you have a history of high blood pressure, you may need to have your doctor adjust your medications. Sometimes, your high measurement here may indicate that you have developed high blood pressure that will stay high unless it is treated. Sudden very high blood pressure can cause problems, but usually high blood pressure causes problems over months to years.      Blood pressure is almost never lowered in the Emergency Department, because studies have shown that lowering blood pressure too quickly is much more dangerous than leaving it alone.    You need to follow up with your doctor in 1-3 days to get your blood pressure rechecked.     Return to the Emergency Department if you start to have:    A severe headache.    Chest pain.    Shortness of  breath.    Weakness or numbness that affects one part of the body.    Confusion.    Vision changes.    Significant swelling of legs and/or eyes.    A reaction to any medication started in the Emergency Department.    What can I do to help myself?    Avoid alcohol.    Take any blood pressure medicine that you are prescribed.    Get a good night s sleep.    Lower your salt intake.    Exercise.    Lose weight.    Manage stress.    If blood pressure medication was started in the Emergency Department:    The medicine may not have an immediate effect. The body and brain determine what blood pressure you have. The medicine s job is to retrain the body s  thermostat  to a lower blood pressure.    You will need to follow up with your doctor to see how this medicine is working for you.  If you were given a prescription for medicine here today, be sure to read all of the information (including the package insert) that comes with your prescription.  This will include important information about the medicine, its side effects, and any warnings that you need to know about.  The pharmacist who fills the prescription can provide more information and answer questions you may have about the medicine.  If you have questions or concerns that the pharmacist cannot address, please call or return to the Emergency Department.   Opioid Medication Information    Pain medications are among the most commonly prescribed medicines, so we are including this information for all our patients. If you did not receive pain medication or get a prescription for pain medicine, you can ignore it.     You may have been given a prescription for an opioid (narcotic) pain medicine and/or have received a pain medicine while here in the Emergency Department. These medicines can make you drowsy or impaired. You must not drive, operate dangerous equipment, or engage in any other dangerous activities while taking these medications. If you drive while taking these  medications, you could be arrested for DUI, or driving under the influence. Do not drink any alcohol while you are taking these medications.     Opioid pain medications can cause addiction. If you have a history of chemical dependency of any type, you are at a higher risk of becoming addicted to pain medications.  Only take these prescribed medications to treat your pain when all other options have been tried. Take it for as short a time and as few doses as possible. Store your pain pills in a secure place, as they are frequently stolen and provide a dangerous opportunity for children or visitors in your house to start abusing these powerful medications. We will not replace any lost or stolen medicine.  As soon as your pain is better, you should flush all your remaining medication.     Many prescription pain medications contain Tylenol  (acetaminophen), including Vicodin , Tylenol #3 , Norco , Lortab , and Percocet .  You should not take any extra pills of Tylenol  if you are using these prescription medications or you can get very sick.  Do not ever take more than 3000 mg of acetaminophen in any 24 hour period.    All opioids tend to cause constipation. Drink plenty of water and eat foods that have a lot of fiber, such as fruits, vegetables, prune juice, apple juice and high fiber cereal.  Take a laxative if you don t move your bowels at least every other day. Miralax , Milk of Magnesia, Colace , or Senna  can be used to keep you regular.      Remember that you can always come back to the Emergency Department if you are not able to see your regular doctor in the amount of time listed above, if you get any new symptoms, or if there is anything that worries you.    Discharge Instructions  Incidental Findings    An incidental finding is something unexpected that was found while you were being treated today.  While this finding is not an emergency, you will want to follow up with your primary doctor to determine if  anything should be done about it.    These findings can come from:    Checking your vital signs (example: high blood pressure).    Taking your history (example: unexplained weight loss).    The physical exam (example: a heart murmur).    Laboratory study (example: anemia).    X-rays/ultrasound/CT or other imaging (example: an unexplained mass).    Return to the Emergency Department if:    Your condition worsens.    You develop unexpected pain.    You now develop new symptoms or have new concerns.    Follow up with your doctor:    Follow up within the next week to discuss the results of all of your tests and about the main reason for your visit today.    Inform your doctor of what testing you had done today. You may want to obtain copies of your results from the medical records department.    Modern medical technology has become very sensitive.  Unexpected or incidental findings are very common and do not always indicate a problem.  However, sometimes problems are found very early before symptoms have even started. While these findings are not an emergency, this may allow your primary care doctor to start the earliest treatment possible. Sometimes these incidental findings are not discovered until later when the final report is reported or when your primary care doctor compares our finding to your previous studies. Therefore, it is important for you to always review all results and studies with your primary care doctor or clinic after an Emergency Department visit.  If you were given a prescription for medicine here today, be sure to read all of the information (including the package insert) that comes with your prescription.  This will include important information about the medicine, its side effects, and any warnings that you need to know about.  The pharmacist who fills the prescription can provide more information and answer questions you may have about the medicine.  If you have questions or concerns that the  pharmacist cannot address, please call or return to the Emergency Department.   Remember that you can always come back to the Emergency Department if you are not able to see your regular doctor in the amount of time listed above, if you get any new symptoms, or if there is anything that worries you.        Future Appointments        Provider Department Dept Phone Center    7/18/2017 10:45 AM PAYAL Baker CNP; [facility] LANGUAGE SERVICES [facility] [phone] [place]    8/1/2017 7:00 AM PAYAL Baker CNP [facility] [phone] [place]      24 Hour Appointment Hotline       To make an appointment at any [facility], call [phone] ([phone]). If you don't have a family doctor or clinic, we will help you find one. [facility] are conveniently located to serve the needs of you and your family.             Review of your medicines      START taking        Dose / Directions Last dose taken    hydrochlorothiazide 12.5 MG Tabs tablet   Dose:  12.5 mg   Quantity:  4 tablet        Take 1 tablet (12.5 mg) by mouth daily   Refills:  0          Our records show that you are taking the medicines listed below. If these are incorrect, please call your family doctor or clinic.        Dose / Directions Last dose taken    * albuterol 108 (90 BASE) MCG/ACT Inhaler   Commonly known as:  PROAIR HFA/PROVENTIL HFA/VENTOLIN HFA   Dose:  2 puff        Inhale 2 puffs into the lungs every 6 hours   Refills:  0        * albuterol (2.5 MG/3ML) 0.083% neb solution   Dose:  1 vial   Quantity:  50 vial        Take 1 vial (2.5 mg) by nebulization every 6 hours as needed for shortness of breath / dyspnea or wheezing   Refills:  1        atenolol 50 MG tablet   Commonly known as:  TENORMIN   Dose:  100 mg   Quantity:  180 tablet        Take 2 tablets (100 mg) by mouth daily   Refills:  1        COLCRYS PO   Dose:  0.6 mg        Take 0.6 mg by mouth daily   Refills:  0         HYDROcodone-acetaminophen 5-325 MG per tablet   Commonly known as:  NORCO   Dose:  1-2 tablet   Quantity:  30 tablet        Take 1-2 tablets by mouth every 4 hours as needed for other (Moderate to Severe Pain)   Refills:  0        losartan 50 MG tablet   Commonly known as:  COZAAR   Dose:  50 mg   Quantity:  90 tablet        Take 1 tablet (50 mg) by mouth daily   Refills:  1        order for DME   Quantity:  1 Device        Neb machine and tubing   Refills:  prn        ranitidine 150 MG tablet   Commonly known as:  ZANTAC   Dose:  150 mg   Quantity:  20 tablet        Take 1 tablet (150 mg) by mouth 2 times daily for 10 days   Refills:  0        * Notice:  This list has 2 medication(s) that are the same as other medications prescribed for you. Read the directions carefully, and ask your doctor or other care provider to review them with you.            Prescriptions were sent or printed at these locations (1 Prescription)                   Other Prescriptions                Printed at Department/Unit printer (1 of 1)         hydrochlorothiazide 12.5 MG TABS tablet                Procedures and tests performed during your visit     Alcohol ethyl    CBC with platelets differential    CT Abdomen Pelvis w/o Contrast    CT Head w/o Contrast    Cardiac Continuous Monitoring    Comprehensive metabolic panel    EKG 12 lead    Glucose by meter    Glucose monitor nursing POCT    INR    ISTAT troponin nursing POCT    Lipase    Peripheral IV catheter    Pulse oximetry nursing    Troponin POCT    UA with Microscopic    Vital signs    XR Chest 2 Views      Orders Needing Specimen Collection     None      Pending Results     No orders found from 7/14/2017 to 7/17/2017.            Pending Culture Results     No orders found from 7/14/2017 to 7/17/2017.            Pending Results Instructions     If you had any lab results that were not finalized at the time of your Discharge, you can call the ED Lab Result RN at [phone]. You  will be contacted by this team for any positive Lab results or changes in treatment. The nurses are available 7 days a week from 10A to 6:30P.  You can leave a message 24 hours per day and they will return your call.        Test Results From Your Hospital Stay        7/16/2017  3:41 PM      Component Results     Component Value Ref Range & Units Status    WBC 6.3 4.0 - 11.0 10e9/L Final    RBC Count 4.08 (L) 4.4 - 5.9 10e12/L Final    Hemoglobin 13.1 (L) 13.3 - 17.7 g/dL Final    Hematocrit 39.5 (L) 40.0 - 53.0 % Final    MCV 97 78 - 100 fl Final    MCH 32.1 26.5 - 33.0 pg Final    MCHC 33.2 31.5 - 36.5 g/dL Final    RDW 13.0 10.0 - 15.0 % Final    Platelet Count 205 150 - 450 10e9/L Final    Diff Method Automated Method  Final    % Neutrophils 43.6 % Final    % Lymphocytes 31.6 % Final    % Monocytes 8.3 % Final    % Eosinophils 15.7 % Final    % Basophils 0.6 % Final    % Immature Granulocytes 0.2 % Final    Nucleated RBCs 0 0 /100 Final    Absolute Neutrophil 2.8 1.6 - 8.3 10e9/L Final    Absolute Lymphocytes 2.0 0.8 - 5.3 10e9/L Final    Absolute Monocytes 0.5 0.0 - 1.3 10e9/L Final    Absolute Eosinophils 1.0 (H) 0.0 - 0.7 10e9/L Final    Absolute Basophils 0.0 0.0 - 0.2 10e9/L Final    Abs Immature Granulocytes 0.0 0 - 0.4 10e9/L Final    Absolute Nucleated RBC 0.0  Final         7/16/2017  3:54 PM      Component Results     Component Value Ref Range & Units Status    INR 1.14 0.86 - 1.14 Final         7/16/2017  3:59 PM      Component Results     Component Value Ref Range & Units Status    Sodium 140 133 - 144 mmol/L Final    Potassium 3.7 3.4 - 5.3 mmol/L Final    Chloride 104 94 - 109 mmol/L Final    Carbon Dioxide 33 (H) 20 - 32 mmol/L Final    Anion Gap 3 3 - 14 mmol/L Final    Glucose 104 (H) 70 - 99 mg/dL Final    Urea Nitrogen 9 7 - 30 mg/dL Final    Creatinine 0.88 0.66 - 1.25 mg/dL Final    GFR Estimate 88 >60 mL/min/1.7m2 Final    Non  GFR Calc    GFR Estimate If Black >90    American GFR Calc   >60 mL/min/1.7m2 Final    Calcium 8.5 8.5 - 10.1 mg/dL Final    Bilirubin Total 0.3 0.2 - 1.3 mg/dL Final    Albumin 3.7 3.4 - 5.0 g/dL Final    Protein Total 7.2 6.8 - 8.8 g/dL Final    Alkaline Phosphatase 56 40 - 150 U/L Final    ALT 21 0 - 70 U/L Final    AST 17 0 - 45 U/L Final         7/16/2017  3:59 PM      Component Results     Component Value Ref Range & Units Status    Lipase 240 73 - 393 U/L Final         7/16/2017  3:59 PM      Component Results     Component Value Ref Range & Units Status    Ethanol g/dL <0.01 <0.01 g/dL Final         7/16/2017  4:24 PM      Component Results     Component Value Ref Range & Units Status    Color Urine Colorless  Final    Appearance Urine Clear  Final    Glucose Urine Negative NEG mg/dL Final    Bilirubin Urine Negative NEG Final    Ketones Urine Negative NEG mg/dL Final    Specific Gravity Urine 1.003 1.003 - 1.035 Final    Blood Urine Negative NEG Final    pH Urine 7.0 5.0 - 7.0 pH Final    Protein Albumin Urine Negative NEG mg/dL Final    Urobilinogen mg/dL 0.0 0.0 - 2.0 mg/dL Final    Nitrite Urine Negative NEG Final    Leukocyte Esterase Urine Negative NEG Final    Source Midstream Urine  Final    WBC Urine 1 0 - 2 /HPF Final    RBC Urine 0 0 - 2 /HPF Final         7/16/2017  4:40 PM      Narrative     CHEST TWO VIEW   7/16/2017 3:53 PM     HISTORY: Chest pain.    COMPARISON: 7/12/2017    FINDINGS: Minimal strand of fibrosis or atelectasis right base  unchanged in the interval. Chest otherwise negative.        Impression     IMPRESSION: Minimal linear opacity right base but unchanged from  7/12/2017.    ZANDRA REYES MD         7/16/2017  4:00 PM      Narrative     CT SCAN OF THE HEAD WITHOUT CONTRAST   7/16/2017 3:47 PM     HISTORY: Left-sided tingling with right eyelid droop.    TECHNIQUE:  Axial images of the head and coronal reformations without  IV contrast material. Radiation dose for this scan was reduced using  automated exposure  control, adjustment of the mA and/or kV according  to patient size, or iterative reconstruction technique.    COMPARISON: 7/12/2017    FINDINGS:  Mild parenchymal volume loss. Ventricles are not enlarged  and there is no hydrocephalus. Thin linear lucency in the right  temporal lobe is unchanged and may be sequela of previous infectious,  inflammatory, or ischemic insult. There is no evidence of intracranial  hemorrhage, mass, acute infarct or anomaly.     The visualized portions of the sinuses and mastoids appear normal.  There is no evidence of trauma. There are bilateral intraocular lens  implants.        Impression     IMPRESSION:  Normal CT scan of the head.      SKYLAR CHEUNG MD         7/16/2017  5:21 PM      Narrative     CT ABDOMEN AND PELVIS WITHOUT CONTRAST  7/16/2017 3:46 PM    HISTORY:  Pain    TECHNIQUE: Scans obtained from the diaphragm through the pelvis  without oral or IV contrast.   Radiation dose for this scan was reduced using automated exposure  control, adjustment of the mA and/or kV according to patient size, or  iterative reconstruction technique.    COMPARISON:  Abdominal ultrasound dated 7/14/2017.    FINDINGS:   0.6 cm diameter noncalcified nodule right lateral costophrenic angle  is noted. Visualized portions of lung bases are otherwise clear. There  are coronary artery calcifications and/or stents. Aortic valve annular  calcifications are noted. There are thoracic aortic calcifications.  There are no aggressive osseous lesions.    The liver, gallbladder, pancreas, spleen, bilateral adrenal glands and  bilateral kidneys are grossly normal in appearance for a noncontrast  CT. No hydronephrosis, nephrolithiasis, hydroureter, or ureteral  calculus is identified. Small outpouching left side of urinary bladder  wall (image 66 series 2) could represent a small diverticulum. Urinary  bladder is otherwise unremarkable.    Prostate gland is enlarged and contains some dystrophic  calcification.  Prostate gland is otherwise unremarkable.    No adenopathy, free fluid, or free air is seen in the peritoneal  cavity. There is nonaneurysmal atherosclerosis.    Small left inguinal hernia contains fat and fluid. Small umbilical  hernia contains adipose tissue and has an opening measuring up to 1.3  cm.    The colon is grossly of normal caliber. No pericolonic inflammatory  change to suggest diverticulitis. Appendix is normal in appearance.  Small bowel is of normal caliber. Questionable thickening of the  dorsal gastric fundal wall (image 20 series 2) could represent fluid  or other ingested material or incomplete distention measuring up to  2.8 cm in thickness. Ingested material is otherwise seen in the  stomach. Stomach is otherwise unremarkable.        Impression     IMPRESSION:  1. Mild prominence of the gastric fundal wall superiorly likely  secondary to ingested material including distention. Infiltrative  process in the stomach is difficult to entirely exclude in the  appropriate setting.  2. Nonaneurysmal atherosclerosis.  3. Noncalcified nodule measuring up to 0.6 cm in the right lung base.  For an indeterminate lung nodule  >4-6 mm:  Low risk patients: Follow-up CT at 12 months; if unchanged, no further  follow-up.  High risk patients: Initial follow-up CT at 6-12 months then at 18-24  months if no change.    - Low Risk: Minimal or absent history of smoking and of other known  risk factors.  - Nonsolid (ground glass) or partly solid nodules may require longer  follow-up to exclude indolent adenocarcinoma.  - Fleischner Society Recommendations, Radiology 2005.    4. No evidence for renal or ureteral calculus.  5. Small left inguinal hernia containing fat and fluid.  6. Question small urinary bladder diverticulum.    LOREN BARROS MD         7/16/2017  3:31 PM      Component Results     Component Value Ref Range & Units Status    Troponin I 0.00 0.00 - 0.10 ug/L Final         7/16/2017  4:06  PM      Component Results     Component Value Ref Range & Units Status    Glucose 100 (H) 70 - 99 mg/dL Final                Clinical Quality Measure: Blood Pressure Screening     Your blood pressure was checked while you were in the emergency department today. The last reading we obtained was  BP: (!) 168/96 . Please read the guidelines below about what these numbers mean and what you should do about them.  If your systolic blood pressure (the top number) is less than 120 and your diastolic blood pressure (the bottom number) is less than 80, then your blood pressure is normal. There is nothing more that you need to do about it.  If your systolic blood pressure (the top number) is 120-139 or your diastolic blood pressure (the bottom number) is 80-89, your blood pressure may be higher than it should be. You should have your blood pressure rechecked within a year by a primary care provider.  If your systolic blood pressure (the top number) is 140 or greater or your diastolic blood pressure (the bottom number) is 90 or greater, you may have high blood pressure. High blood pressure is treatable, but if left untreated over time it can put you at risk for heart attack, stroke, or kidney failure. You should have your blood pressure rechecked by a primary care provider within the next 4 weeks.  If your provider in the emergency department today gave you specific instructions to follow-up with your doctor or provider even sooner than that, you should follow that instruction and not wait for up to 4 weeks for your follow-up visit.        Thank you for choosing [facility]       Thank you for choosing [facility] for your care. Our goal is always to provide you with excellent care. Hearing back from our patients is one way we can continue to improve our services. Please take a few minutes to complete the written survey that you may receive in the mail after you visit with us. Thank you!        MyChart Information     [employer] lets  "you send messages to your doctor, view your test results, renew your prescriptions, schedule appointments and more. To sign up, go to [email or web address] . Click on \"Log in\" on the left side of the screen, which will take you to the Welcome page. Then click on \"Sign up Now\" on the right side of the page.     You will be asked to enter the access code listed below, as well as some personal information. Please follow the directions to create your username and password.     Your access code is: [email or web address]  Expires: 2017  9:27 AM     Your access code will  in 90 days. If you need help or a new code, please call your [facility] clinic or [phone].        Care EveryWhere ID     This is your Care EveryWhere ID. This could be used by other organizations to access your [facility] medical records  [id number]        Equal Access to Services     LILIANA KENDALL : Hadii marlene Chavira, waaxda luasif, qaybta kaalmada logan, [place] orr. So [facility] [phone].    ATENCIÓN: Si habla español, tiene a urban disposición servicios gratuitos de asistencia lingüística. Jessica al [phone].    We comply with applicable federal civil rights laws and [place] laws. We do not discriminate on the basis of race, color, national origin, age, disability sex, sexual orientation or gender identity.            After Visit Summary       This is your record. Keep this with you and show to your community pharmacist(s) and doctor(s) at your next visit.                  "

## 2017-07-16 NOTE — DISCHARGE INSTRUCTIONS
Discharge Instructions  Hypertension - High Blood Pressure    During you visit to the Emergency Department, your blood pressure was higher than the recommended blood pressure.  This may be related to stress, pain, medication or other temporary conditions. In these cases, your blood pressure may return to normal on its own. If you have a history of high blood pressure, you may need to have your doctor adjust your medications. Sometimes, your high measurement here may indicate that you have developed high blood pressure that will stay high unless it is treated. Sudden very high blood pressure can cause problems, but usually high blood pressure causes problems over months to years.      Blood pressure is almost never lowered in the Emergency Department, because studies have shown that lowering blood pressure too quickly is much more dangerous than leaving it alone.    You need to follow up with your doctor in 1-3 days to get your blood pressure rechecked.     Return to the Emergency Department if you start to have:    A severe headache.    Chest pain.    Shortness of breath.    Weakness or numbness that affects one part of the body.    Confusion.    Vision changes.    Significant swelling of legs and/or eyes.    A reaction to any medication started in the Emergency Department.    What can I do to help myself?    Avoid alcohol.    Take any blood pressure medicine that you are prescribed.    Get a good night s sleep.    Lower your salt intake.    Exercise.    Lose weight.    Manage stress.    If blood pressure medication was started in the Emergency Department:    The medicine may not have an immediate effect. The body and brain determine what blood pressure you have. The medicine s job is to retrain the body s  thermostat  to a lower blood pressure.    You will need to follow up with your doctor to see how this medicine is working for you.  If you were given a prescription for medicine here today, be sure to read all of  the information (including the package insert) that comes with your prescription.  This will include important information about the medicine, its side effects, and any warnings that you need to know about.  The pharmacist who fills the prescription can provide more information and answer questions you may have about the medicine.  If you have questions or concerns that the pharmacist cannot address, please call or return to the Emergency Department.   Opioid Medication Information    Pain medications are among the most commonly prescribed medicines, so we are including this information for all our patients. If you did not receive pain medication or get a prescription for pain medicine, you can ignore it.     You may have been given a prescription for an opioid (narcotic) pain medicine and/or have received a pain medicine while here in the Emergency Department. These medicines can make you drowsy or impaired. You must not drive, operate dangerous equipment, or engage in any other dangerous activities while taking these medications. If you drive while taking these medications, you could be arrested for DUI, or driving under the influence. Do not drink any alcohol while you are taking these medications.     Opioid pain medications can cause addiction. If you have a history of chemical dependency of any type, you are at a higher risk of becoming addicted to pain medications.  Only take these prescribed medications to treat your pain when all other options have been tried. Take it for as short a time and as few doses as possible. Store your pain pills in a secure place, as they are frequently stolen and provide a dangerous opportunity for children or visitors in your house to start abusing these powerful medications. We will not replace any lost or stolen medicine.  As soon as your pain is better, you should flush all your remaining medication.     Many prescription pain medications contain Tylenol  (acetaminophen),  including Vicodin , Tylenol #3 , Norco , Lortab , and Percocet .  You should not take any extra pills of Tylenol  if you are using these prescription medications or you can get very sick.  Do not ever take more than 3000 mg of acetaminophen in any 24 hour period.    All opioids tend to cause constipation. Drink plenty of water and eat foods that have a lot of fiber, such as fruits, vegetables, prune juice, apple juice and high fiber cereal.  Take a laxative if you don t move your bowels at least every other day. Miralax , Milk of Magnesia, Colace , or Senna  can be used to keep you regular.      Remember that you can always come back to the Emergency Department if you are not able to see your regular doctor in the amount of time listed above, if you get any new symptoms, or if there is anything that worries you.    Discharge Instructions  Incidental Findings    An incidental finding is something unexpected that was found while you were being treated today.  While this finding is not an emergency, you will want to follow up with your primary doctor to determine if anything should be done about it.    These findings can come from:    Checking your vital signs (example: high blood pressure).    Taking your history (example: unexplained weight loss).    The physical exam (example: a heart murmur).    Laboratory study (example: anemia).    X-rays/ultrasound/CT or other imaging (example: an unexplained mass).    Return to the Emergency Department if:    Your condition worsens.    You develop unexpected pain.    You now develop new symptoms or have new concerns.    Follow up with your doctor:    Follow up within the next week to discuss the results of all of your tests and about the main reason for your visit today.    Inform your doctor of what testing you had done today. You may want to obtain copies of your results from the medical records department.    Modern medical technology has become very sensitive.  Unexpected  or incidental findings are very common and do not always indicate a problem.  However, sometimes problems are found very early before symptoms have even started. While these findings are not an emergency, this may allow your primary care doctor to start the earliest treatment possible. Sometimes these incidental findings are not discovered until later when the final report is reported or when your primary care doctor compares our finding to your previous studies. Therefore, it is important for you to always review all results and studies with your primary care doctor or clinic after an Emergency Department visit.  If you were given a prescription for medicine here today, be sure to read all of the information (including the package insert) that comes with your prescription.  This will include important information about the medicine, its side effects, and any warnings that you need to know about.  The pharmacist who fills the prescription can provide more information and answer questions you may have about the medicine.  If you have questions or concerns that the pharmacist cannot address, please call or return to the Emergency Department.   Remember that you can always come back to the Emergency Department if you are not able to see your regular doctor in the amount of time listed above, if you get any new symptoms, or if there is anything that worries you.

## 2017-07-16 NOTE — ED PROVIDER NOTES
History     Chief Complaint:  High blood pressure and numbness    HPI   Patient's son is translating for patient secondary to language barrier  Luis Yates is a 57 year old male who presents with his son for evaluation of dario blood pressure and numbness. Patient was here on 7/14/17 for high blood pressure around 190 and was discharged with Zantac. The son states patient was find yesterday and this morning with his blood pressure around 130. Patient did have prescription changes on 7/12/17. About 1.5 hours ago, patient's blood pressure was 190 and associated chest pain (5/10) localized to his sternum radiating to his left shoulder. In addition, he experienced numbness in his nose and lips and discomfort in the left eye. Here, patient's blood pressure has decreased slightly but still has persisting symptoms and also abdominal pain. His right eye is asymmetrical compared to his left eye. He denies headache, fever, vision changes, weakness, shortness of breath, nausea, vomiting, or urinary symptoms.     Allergies:  No known drug allergies     Medications:    Zantac  Cozaar  Norco  Tenormin  Albuterol  Colchicine    Past Medical History:    Hyperlipidemia  Hypertension   Gout    Past Surgical History:    Hernia repair    Family History:    History reviewed. No pertinent family history.      Social History:  Smoking status: never smoker  Alcohol use: no   Marital Status:       Review of Systems   Constitutional: Negative for fever.   Eyes: Positive for pain.   Respiratory: Negative for shortness of breath.    Cardiovascular: Positive for chest pain.   Gastrointestinal: Positive for abdominal pain. Negative for diarrhea, nausea and vomiting.   Genitourinary: Negative for dysuria and hematuria.   Musculoskeletal: Positive for arthralgias.   Neurological: Positive for numbness. Negative for weakness, light-headedness and headaches.   All other systems reviewed and are negative.      Physical Exam   First  Vitals:  BP: (!) 192/91  Recheck 151/84  Pulse: 67  Temp: 98.4  F (36.9  C)  Resp: 16  SpO2: 97 %       Physical Exam  General: The patient is alert, in no respiratory distress.    HENT: Mucous membranes moist.    Cardiovascular: Regular rate and rhythm. Good pulses in all four extremities. Normal capillary refill and skin turgor.     Respiratory: Lungs are clear. No nasal flaring. No retractions. No wheezing, no crackles.    Gastrointestinal: Abdomen soft. No guarding, no rebound. No palpable hernias. Tenderness to palpation on his mid abdomen. No pulsatile abdominal mass.     Musculoskeletal: No gross deformity.     Skin: No rashes or petechiae.     Neurologic: The patient is alert and oriented. GCS 15. Minimal right eyelid droop. No pronator drift.  Gives appropriate answers. Good strength in all extremities. No gross neurologic deficit. Gross sensation intact. Pupils are round and reactive. No meningismus. Temp and light touches intact    Lymphatic: No cervical adenopathy. No lower extremity swelling.    Psychiatric: The patient is non-tearful.     Emergency Department Course   ECG (14:33:47):  Rate 67 bpm. AR interval 122. QRS duration 78. QT/QTc 382/403. P-R-T axes 69 43 28. Normal sinus rhythm. Normal ECG. Interpreted at 1435 by Fredy Epstein MD.     Imaging:  Radiographic findings were communicated with the patient and family who voiced understanding of the findings.    X-ray Chest, 2 views:  Minimal linear opacity right base but unchanged from  7/12/2017.  Result per radiology.      CT-scan Head w/o contrast:  Normal CT scan of the head.  As read by Radiology.      CT-scan Abdomen/pelvis w/o contrast:  1. Mild prominence of the gastric fundal wall superiorly likely  secondary to ingested material including distention. Infiltrative  process in the stomach is difficult to entirely exclude in the  appropriate setting.  2. Nonaneurysmal atherosclerosis.  3. Noncalcified nodule measuring up to 0.6 cm in  the right lung base.  For an indeterminate lung nodule  >4-6 mm:  Low risk patients: Follow-up CT at 12 months; if unchanged, no further  follow-up.  High risk patients: Initial follow-up CT at 6-12 months then at 18-24  months if no change.    - Low Risk: Minimal or absent history of smoking and of other known  risk factors.  - Nonsolid (ground glass) or partly solid nodules may require longer  follow-up to exclude indolent adenocarcinoma.  - Fleischner Society Recommendations, Radiology 2005.    4. No evidence for renal or ureteral calculus.  5. Small left inguinal hernia containing fat and fluid.  6. Question small urinary bladder diverticulum.  As read by Radiology.      Laboratory:  1440 INR: 1.14  1440 Lipase: 240  1440 EtOH: <0.01  1510 Troponin POCT: 0.00  1602 Glucose: 100(H)  CBC: RBC 4.08(L), HCG 13.1(L), HCT 39.5(L), o/w  WNL (WBC 6.3, )    CMP: Carbon dioxide: 33(H), glucose 104(H) o/w WNL (Creatinine 0.88)   UA: Negative     Interventions:  1805 Tylenol 500 mg PO  1805 Microzide 12.5 mg PO     Emergency Department Course:  Past medical records, nursing notes, and vitals reviewed.  1443: I performed an exam of the patient and obtained history, as documented above. I discussed CT scan of the head and abdomen with patient and family.   1658: I rechecked the patient. Patient states numbness is getting better and will give Tylenol.   1713: I rechecked the patient. Findings and plan explained to the Patient and son. Patient discharged home with instructions regarding supportive care, medications, and reasons to return. The importance of close follow-up was reviewed.      Impression & Plan    Medical Decision Making:  Luis Yates is a 57 year old male had been seen in the last two days for elevated blood pressure with chest pain and the work up has been negative including a CT scan that demonstrated a pulmonary nodule. The patient however presents after having his blood pressure come back down to  a normal level and was at the same level it was two days age. There was some mile droop to the right eye lip but was not specific to head CT despite his symptoms have been present for sometimes did no show signs of a stroke. He was not a TPA candidate. The patient s blood pressure has come down nicely and I did not believe likely that the eye lid indicated a stroke and that there was no surrounding involvement of the forehead facial nerve sensation. I did consider with the tingling that this could be a stroke; however, there is no focal weakness and his gross sensation was intact. Anterior cord syndrome would be unlikely as well. Migraine could definitely be a possibility but unlikely of his age group. I check electrolytes and there was no specific cause found. The patient was actually feeling better without any treatment when his blood pressure came down and likely that his blood pressure is causing some of these symptoms. I did add Hydrochlorothiazide to his regiment to just help to control his blood pressure using a mild agent and referred him to his PCP. He was discharged in an improved condition with no signs of cardiac ischemia or stroke.     Diagnosis:    ICD-10-CM    1. Hypertensive urgency I16.0    2. Paresthesias R20.2    3. Pulmonary nodules R91.8        Disposition:  discharged to home    Discharge Medications:  New Prescriptions    HYDROCHLOROTHIAZIDE 12.5 MG TABS TABLET    Take 1 tablet (12.5 mg) by mouth daily       Efrain Graham  7/16/2017   [facility] EMERGENCY DEPARTMENT  I, Efrain Graham, am serving as a scribe at 2:43 PM on 7/16/2017 to document services personally performed by Fredy Epstein MD based on my observations and the provider's statements to me.       Fredy Epstein MD  07/16/17 2010

## 2017-07-16 NOTE — ED AVS SNAPSHOT
[facility] Emergency Department    [address]    [facility] [address]    Phone:  [phone]    Fax:  [phone]                                       Luis Yates   MRN: [id number]    Department:  [facility] Emergency Department   Date of Visit:  7/16/2017           After Visit Summary Signature Page     I have received my discharge instructions, and my questions have been answered. I have discussed any challenges I see with this plan with the nurse or doctor.    ..........................................................................................................................................  Patient/Patient Representative Signature      ..........................................................................................................................................  Patient Representative Print Name and Relationship to Patient    ..................................................               ................................................  Date                                            Time    ..........................................................................................................................................  Reviewed by Signature/Title    ...................................................              ..............................................  Date                                                            Time

## 2017-07-17 ENCOUNTER — HOSPITAL ENCOUNTER (OUTPATIENT)
Facility: CLINIC | Age: 57
End: 2017-07-17
Attending: INTERNAL MEDICINE | Admitting: INTERNAL MEDICINE
Payer: COMMERCIAL

## 2017-07-17 LAB — INTERPRETATION ECG - MUSE: NORMAL

## 2017-07-18 ENCOUNTER — OFFICE VISIT (OUTPATIENT)
Dept: INTERNAL MEDICINE | Facility: CLINIC | Age: 57
End: 2017-07-18
Payer: COMMERCIAL

## 2017-07-18 VITALS
SYSTOLIC BLOOD PRESSURE: 170 MMHG | BODY MASS INDEX: 27.38 KG/M2 | HEIGHT: 62 IN | TEMPERATURE: 97.9 F | OXYGEN SATURATION: 97 % | HEART RATE: 64 BPM | WEIGHT: 148.8 LBS | DIASTOLIC BLOOD PRESSURE: 72 MMHG | RESPIRATION RATE: 12 BRPM

## 2017-07-18 DIAGNOSIS — K21.9 GASTROESOPHAGEAL REFLUX DISEASE WITHOUT ESOPHAGITIS: ICD-10-CM

## 2017-07-18 DIAGNOSIS — R30.9 PAIN WITH URINATION: ICD-10-CM

## 2017-07-18 DIAGNOSIS — I10 HTN (HYPERTENSION), BENIGN: Primary | ICD-10-CM

## 2017-07-18 LAB
ALBUMIN UR-MCNC: NEGATIVE MG/DL
APPEARANCE UR: CLEAR
BILIRUB UR QL STRIP: NEGATIVE
COLOR UR AUTO: YELLOW
GLUCOSE UR STRIP-MCNC: NEGATIVE MG/DL
HGB UR QL STRIP: NEGATIVE
KETONES UR STRIP-MCNC: NEGATIVE MG/DL
LEUKOCYTE ESTERASE UR QL STRIP: NEGATIVE
NITRATE UR QL: NEGATIVE
PH UR STRIP: 7 PH (ref 5–7)
SP GR UR STRIP: 1.01 (ref 1–1.03)
URN SPEC COLLECT METH UR: NORMAL
UROBILINOGEN UR STRIP-ACNC: 0.2 EU/DL (ref 0.2–1)

## 2017-07-18 PROCEDURE — 99496 TRANSJ CARE MGMT HIGH F2F 7D: CPT | Performed by: NURSE PRACTITIONER

## 2017-07-18 PROCEDURE — 81003 URINALYSIS AUTO W/O SCOPE: CPT | Performed by: NURSE PRACTITIONER

## 2017-07-18 PROCEDURE — 87338 HPYLORI STOOL AG IA: CPT | Performed by: NURSE PRACTITIONER

## 2017-07-18 RX ORDER — LOSARTAN POTASSIUM AND HYDROCHLOROTHIAZIDE 25; 100 MG/1; MG/1
1 TABLET ORAL DAILY
Qty: 90 TABLET | Refills: 1 | Status: ON HOLD | OUTPATIENT
Start: 2017-07-18 | End: 2017-07-22

## 2017-07-18 NOTE — PROGRESS NOTES
"  SUBJECTIVE:                                                    Luis Yates is a 57 year old male who presents to clinic today for the following health issues:      ED/UC Followup:    Facility:  [facility] ED  Date of visit: 7/16/2017  Reason for visit: High blood Pressure   Current Status: 140/70 with resting   180/70 at first     Complains of burning in stomach   Started on zantac   Endoscopy/ colonoscopy  in August 2, 2017    Recheck 128/60 blood pressure today            Problem list and histories reviewed & adjusted, as indicated.  Additional history:     Patient Active Problem List   Diagnosis     HTN (hypertension), benign     Gout     Hypercholesteremia     Past Surgical History:   Procedure Laterality Date     HERNIA REPAIR  2010    left      HERNIA REPAIR  2010     LAPAROSCOPIC HERNIORRHAPHY INGUINAL BILATERAL Bilateral 6/1/2017    Procedure: LAPAROSCOPIC HERNIORRHAPHY INGUINAL BILATERAL;  LAPAROSCOPIC HERNIORRHAPHY INGUINAL BILATERAL with mesh;  Surgeon: Pola Barbosa MD;  Location:  OR       Social History   Substance Use Topics     Smoking status: Never Smoker     Smokeless tobacco: Not on file     Alcohol use No     Family History   Problem Relation Age of Onset     Unknown/Adopted Mother      Unknown/Adopted Father            Reviewed and updated as needed this visit by clinical staff  Tobacco  Allergies  Meds  Med Hx  Surg Hx  Fam Hx  Soc Hx      Reviewed and updated as needed this visit by Provider         ROS:  Constitutional, HEENT, cardiovascular, pulmonary, GI, , musculoskeletal, neuro, skin, endocrine and psych systems are negative, except as otherwise noted.    OBJECTIVE:     /72 (BP Location: Left arm, Patient Position: Sitting, Cuff Size: Adult Regular)  Pulse 64  Temp 97.9  F (36.6  C) (Oral)  Resp 12  Ht 5' 2\" (1.575 m)  Wt 148 lb 12.8 oz (67.5 kg)  SpO2 97%  BMI 27.22 kg/m2  Body mass index is 27.22 kg/(m^2).  GENERAL: alert and no distress; here with "  and daughter   RESP: lungs clear to auscultation - no rales, rhonchi or wheezes  CV: regular rate and rhythm, normal S1 S2, no S3 or S4, no murmur, click or rub, no peripheral edema   ABDOMEN: soft, nontender, no hepatosplenomegaly, no masses and bowel sounds normal  MS: no gross musculoskeletal defects noted, no edema  NEURO: Normal strength and tone, mentation intact and speech normal  PSYCH: mentation appears normal, affect normal/bright    Diagnostic Test Results:  Urine - of note negative urine 2 days prior in ER  Stool test     ASSESSMENT/PLAN:             1. HTN (hypertension), benign  Not well controlled in general  Second blood pressure in reasonable range   Will increase dose HTN medication and recheck blood pressure in 1-2 weeks    - losartan-hydrochlorothiazide (HYZAAR) 100-25 MG per tablet; Take 1 tablet by mouth daily  Dispense: 90 tablet; Refill: 1    2. Gastroesophageal reflux disease without esophagitis  Zantac not helping   Test for H pylori  Omeprazole  EGD scheduled   - H Pylori antigen stool; Future  - omeprazole (PRILOSEC) 20 MG CR capsule; Take 1 capsule (20 mg) by mouth 2 times daily  Dispense: 60 capsule; Refill: 1    3. Pain with urination  Said he might have issues after hernia   - *UA reflex to Microscopic and Culture ([place] and [facility] (except [place] and [place])    Patient Instructions   Losartan 100 mg / Hydrochlorothiazide 25 mg daily     Urine test     Stool test     Omeprazole twice daily for stomach      Blood pressure recheck in 1-2 weeks     Follow up with any questions or concerns.             PAYAL Baker [facility]

## 2017-07-18 NOTE — MR AVS SNAPSHOT
After Visit Summary   7/18/2017    Luis Yatse    MRN: [id number]           Patient Information     Date Of Birth          1960        Visit Information        Provider Department      7/18/2017 10:45 AM Carola Grullon APRN CNP; LANGUAGE BANC [facility]        Today's Diagnoses     HTN (hypertension), benign    -  1    Gastroesophageal reflux disease without esophagitis        Pain with urination          Care Instructions    Losartan 100 mg / Hydrochlorothiazide 25 mg daily     Urine test     Stool test     Omeprazole twice daily for stomach     Follow up with any questions or concerns.                 Follow-ups after your visit        Your next 10 appointments already scheduled     Aug 01, 2017  7:00 AM CDT   SHORT with PAYAL Baker CNP   [facility] ([facility])    [address]  [facility] [id number] [phone]            Aug 02, 2017   Procedure with Darrick Moseley MD   [facility] ([facility])    [address] [place] [address]   [phone]           [facility] is located at [address] [place]              Future tests that were ordered for you today     Open Future Orders        Priority Expected Expires Ordered    H Pylori antigen stool Routine  8/17/2017 7/18/2017            Who to contact     If you have questions or need follow up information about today's clinic visit or your schedule please contact [facility] directly at [phone].  Normal or non-critical lab and imaging results will be communicated to you by MyChart, letter or phone within 4 business days after the clinic has received the results. If you do not hear from us within 7 days, please contact the clinic through MyChart or phone. If you have a critical or abnormal lab result, we will notify you by phone as soon as possible.  Submit refill  "requests through Content[employer] or call your pharmacy and they will forward the refill request to us. Please allow 3 business days for your refill to be completed.          Additional Information About Your Visit        [facility] [place] Information     Content360 lets you send messages to your doctor, view your test results, renew your prescriptions, schedule appointments and more. To sign up, go to [email or web address] . Click on \"Log in\" on the left side of the screen, which will take you to the Welcome page. Then click on \"Sign up Now\" on the right side of the page.     You will be asked to enter the access code listed below, as well as some personal information. Please follow the directions to create your username and password.     Your access code is: [email or web address]  Expires: 2017  9:27 AM     Your access code will  in 90 days. If you need help or a new code, please call your [place] clinic or [phone].        Care EveryWhere ID     This is your Care EveryWhere ID. This could be used by other organizations to access your [place] medical records  [id number]        Your Vitals Were     Pulse Temperature Respirations Height Pulse Oximetry BMI (Body Mass Index)    64 97.9  F (36.6  C) (Oral) 12 5' 2\" (1.575 m) 97% 27.22 kg/m2       Blood Pressure from Last 3 Encounters:   17 170/72   17 (!) 168/96   17 (!) 186/94    Weight from Last 3 Encounters:   17 148 lb 12.8 oz (67.5 kg)   17 153 lb (69.4 kg)   17 153 lb 3.2 oz (69.5 kg)              We Performed the Following     *UA reflex to Microscopic and Culture ([place] [facility] [place] [facility] (except [place] and [place])          Today's Medication Changes          These changes are accurate as of: 17 11:48 AM.  If you have any questions, ask your nurse or doctor.               Start taking these medicines.        Dose/Directions    losartan-hydrochlorothiazide 100-25 MG per tablet   Commonly known as:  HYZAAR   Used for:  HTN " (hypertension), benign   Started by:  Carola Grullon APRN CNP        Dose:  1 tablet   Take 1 tablet by mouth daily   Quantity:  90 tablet   Refills:  1       omeprazole 20 MG CR capsule   Commonly known as:  priLOSEC   Used for:  Gastroesophageal reflux disease without esophagitis   Started by:  Carola Grullon APRN CNP        Dose:  20 mg   Take 1 capsule (20 mg) by mouth 2 times daily   Quantity:  60 capsule   Refills:  1            Where to get your medicines      These medications were sent to [id number] - [address] [address]     Phone:  [phone]     losartan-hydrochlorothiazide 100-25 MG per tablet    omeprazole 20 MG CR capsule                Primary Care Provider Office Phone # Fax #    PAYAL Baker [phone]       [facility] [address] [facility] [address]        Equal Access to Services     LILIANA KENDALL AH: Hadii marlene ku hadasho Soomaali, waaxda luqadaha, qaybta kaalmada adeegyada, waxay idiin haylynsey mojica . So [facility] [phone].    ATENCIÓN: Si habla español, tiene a urban disposición servicios gratuitos de asistencia lingüística. Llame al [phone].    We comply with applicable federal civil rights laws and [place] laws. We do not discriminate on the basis of race, color, national origin, age, disability sex, sexual orientation or gender identity.            Thank you!     Thank you for choosing [facility]  for your care. Our goal is always to provide you with excellent care. Hearing back from our patients is one way we can continue to improve our services. Please take a few minutes to complete the written survey that you may receive in the mail after your visit with us. Thank you!             Your Updated Medication List - Protect others around you: Learn how to safely use, store and throw away your medicines at  www.disposemymeds.org.          This list is accurate as of: 7/18/17 11:48 AM.  Always use your most recent med list.                   Brand Name Dispense Instructions for use Diagnosis    atenolol 50 MG tablet    TENORMIN    180 tablet    Take 2 tablets (100 mg) by mouth daily    Essential hypertension       hydrochlorothiazide 12.5 MG Tabs tablet     4 tablet    Take 1 tablet (12.5 mg) by mouth daily        losartan 50 MG tablet    COZAAR    90 tablet    Take 1 tablet (50 mg) by mouth daily    HTN (hypertension), benign       losartan-hydrochlorothiazide 100-25 MG per tablet    HYZAAR    90 tablet    Take 1 tablet by mouth daily    HTN (hypertension), benign       omeprazole 20 MG CR capsule    priLOSEC    60 capsule    Take 1 capsule (20 mg) by mouth 2 times daily    Gastroesophageal reflux disease without esophagitis       ranitidine 150 MG tablet    ZANTAC    20 tablet    Take 1 tablet (150 mg) by mouth 2 times daily for 10 days

## 2017-07-18 NOTE — PATIENT INSTRUCTIONS
Losartan 100 mg / Hydrochlorothiazide 25 mg daily     Urine test     Stool test     Omeprazole twice daily for stomach      Blood pressure recheck in 1-2 weeks     Follow up with any questions or concerns.

## 2017-07-18 NOTE — NURSING NOTE
"Chief Complaint   Patient presents with     Hypertension     Chest pain  and back pain it's been like one week        Initial /72 (BP Location: Left arm, Patient Position: Sitting, Cuff Size: Adult Regular)  Pulse 64  Temp 97.9  F (36.6  C) (Oral)  Resp 12  Ht 5' 2\" (1.575 m)  Wt 148 lb 12.8 oz (67.5 kg)  SpO2 97%  BMI 27.22 kg/m2 Estimated body mass index is 27.22 kg/(m^2) as calculated from the following:    Height as of this encounter: 5' 2\" (1.575 m).    Weight as of this encounter: 148 lb 12.8 oz (67.5 kg).  Medication Reconciliation: complete   Yeti SMA  "

## 2017-07-19 LAB
H PYLORI AG STL QL IA: NORMAL
MICRO REPORT STATUS: NORMAL
SPECIMEN SOURCE: NORMAL

## 2017-07-21 ENCOUNTER — HOSPITAL ENCOUNTER (OUTPATIENT)
Facility: CLINIC | Age: 57
Setting detail: OBSERVATION
Discharge: HOME | End: 2017-07-22
Attending: EMERGENCY MEDICINE | Admitting: INTERNAL MEDICINE
Payer: COMMERCIAL

## 2017-07-21 DIAGNOSIS — E87.1 HYPONATREMIA: ICD-10-CM

## 2017-07-21 DIAGNOSIS — I10 HTN (HYPERTENSION), BENIGN: Primary | ICD-10-CM

## 2017-07-21 DIAGNOSIS — R10.13 ABDOMINAL PAIN, EPIGASTRIC: ICD-10-CM

## 2017-07-21 LAB
ALBUMIN SERPL-MCNC: 3.9 G/DL (ref 3.4–5)
ALP SERPL-CCNC: 61 U/L (ref 40–150)
ALT SERPL W P-5'-P-CCNC: 22 U/L (ref 0–70)
ANION GAP SERPL CALCULATED.3IONS-SCNC: 8 MMOL/L (ref 3–14)
AST SERPL W P-5'-P-CCNC: 18 U/L (ref 0–45)
BASOPHILS # BLD AUTO: 0 10E9/L (ref 0–0.2)
BASOPHILS NFR BLD AUTO: 0.7 %
BILIRUB SERPL-MCNC: 0.3 MG/DL (ref 0.2–1.3)
BUN SERPL-MCNC: 10 MG/DL (ref 7–30)
CALCIUM SERPL-MCNC: 8.7 MG/DL (ref 8.5–10.1)
CHLORIDE SERPL-SCNC: 82 MMOL/L (ref 94–109)
CO2 SERPL-SCNC: 31 MMOL/L (ref 20–32)
CREAT SERPL-MCNC: 0.65 MG/DL (ref 0.66–1.25)
DIFFERENTIAL METHOD BLD: ABNORMAL
EOSINOPHIL # BLD AUTO: 0.6 10E9/L (ref 0–0.7)
EOSINOPHIL NFR BLD AUTO: 11.4 %
ERYTHROCYTE [DISTWIDTH] IN BLOOD BY AUTOMATED COUNT: 12 % (ref 10–15)
GFR SERPL CREATININE-BSD FRML MDRD: ABNORMAL ML/MIN/1.7M2
GLUCOSE SERPL-MCNC: 102 MG/DL (ref 70–99)
HCT VFR BLD AUTO: 38.8 % (ref 40–53)
HGB BLD-MCNC: 13.6 G/DL (ref 13.3–17.7)
IMM GRANULOCYTES # BLD: 0 10E9/L (ref 0–0.4)
IMM GRANULOCYTES NFR BLD: 0.2 %
LIPASE SERPL-CCNC: 192 U/L (ref 73–393)
LYMPHOCYTES # BLD AUTO: 1.9 10E9/L (ref 0.8–5.3)
LYMPHOCYTES NFR BLD AUTO: 33.1 %
MCH RBC QN AUTO: 31.7 PG (ref 26.5–33)
MCHC RBC AUTO-ENTMCNC: 35.1 G/DL (ref 31.5–36.5)
MCV RBC AUTO: 90 FL (ref 78–100)
MONOCYTES # BLD AUTO: 0.7 10E9/L (ref 0–1.3)
MONOCYTES NFR BLD AUTO: 12.3 %
NEUTROPHILS # BLD AUTO: 2.4 10E9/L (ref 1.6–8.3)
NEUTROPHILS NFR BLD AUTO: 42.3 %
NRBC # BLD AUTO: 0 10*3/UL
NRBC BLD AUTO-RTO: 0 /100
OSMOLALITY SERPL: 252 MMOL/KG (ref 275–295)
PLATELET # BLD AUTO: 239 10E9/L (ref 150–450)
POTASSIUM SERPL-SCNC: 4.3 MMOL/L (ref 3.4–5.3)
PROT SERPL-MCNC: 7.4 G/DL (ref 6.8–8.8)
RBC # BLD AUTO: 4.29 10E12/L (ref 4.4–5.9)
SODIUM SERPL-SCNC: 121 MMOL/L (ref 133–144)
TROPONIN I SERPL-MCNC: NORMAL UG/L (ref 0–0.04)
WBC # BLD AUTO: 5.6 10E9/L (ref 4–11)

## 2017-07-21 PROCEDURE — 84484 ASSAY OF TROPONIN QUANT: CPT | Performed by: EMERGENCY MEDICINE

## 2017-07-21 PROCEDURE — 36415 COLL VENOUS BLD VENIPUNCTURE: CPT | Performed by: EMERGENCY MEDICINE

## 2017-07-21 PROCEDURE — 85025 COMPLETE CBC W/AUTO DIFF WBC: CPT | Performed by: EMERGENCY MEDICINE

## 2017-07-21 PROCEDURE — 83930 ASSAY OF BLOOD OSMOLALITY: CPT | Performed by: EMERGENCY MEDICINE

## 2017-07-21 PROCEDURE — 99207 ZZC CDG-MDM COMPONENT: MEETS LOW - DOWN CODED: CPT | Performed by: INTERNAL MEDICINE

## 2017-07-21 PROCEDURE — 99285 EMERGENCY DEPT VISIT HI MDM: CPT | Mod: 25

## 2017-07-21 PROCEDURE — 25000132 ZZH RX MED GY IP 250 OP 250 PS 637: Performed by: EMERGENCY MEDICINE

## 2017-07-21 PROCEDURE — 93005 ELECTROCARDIOGRAM TRACING: CPT

## 2017-07-21 PROCEDURE — 25000128 H RX IP 250 OP 636: Performed by: INTERNAL MEDICINE

## 2017-07-21 PROCEDURE — 99219 ZZC INITIAL OBSERVATION CARE,LEVL II: CPT | Performed by: INTERNAL MEDICINE

## 2017-07-21 PROCEDURE — 80053 COMPREHEN METABOLIC PANEL: CPT | Performed by: EMERGENCY MEDICINE

## 2017-07-21 PROCEDURE — 93005 ELECTROCARDIOGRAM TRACING: CPT | Mod: 76

## 2017-07-21 PROCEDURE — G0378 HOSPITAL OBSERVATION PER HR: HCPCS

## 2017-07-21 PROCEDURE — 96374 THER/PROPH/DIAG INJ IV PUSH: CPT

## 2017-07-21 PROCEDURE — 25000125 ZZHC RX 250: Performed by: EMERGENCY MEDICINE

## 2017-07-21 PROCEDURE — 83690 ASSAY OF LIPASE: CPT | Performed by: EMERGENCY MEDICINE

## 2017-07-21 RX ORDER — HYDROCODONE BITARTRATE AND ACETAMINOPHEN 5; 325 MG/1; MG/1
2 TABLET ORAL ONCE
Status: COMPLETED | OUTPATIENT
Start: 2017-07-21 | End: 2017-07-21

## 2017-07-21 RX ORDER — MAGNESIUM SULFATE HEPTAHYDRATE 40 MG/ML
4 INJECTION, SOLUTION INTRAVENOUS EVERY 4 HOURS PRN
Status: DISCONTINUED | OUTPATIENT
Start: 2017-07-21 | End: 2017-07-22 | Stop reason: HOSPADM

## 2017-07-21 RX ORDER — ACETAMINOPHEN 325 MG/1
650 TABLET ORAL EVERY 4 HOURS PRN
Status: DISCONTINUED | OUTPATIENT
Start: 2017-07-21 | End: 2017-07-22 | Stop reason: HOSPADM

## 2017-07-21 RX ORDER — POTASSIUM CHLORIDE 29.8 MG/ML
20 INJECTION INTRAVENOUS
Status: DISCONTINUED | OUTPATIENT
Start: 2017-07-21 | End: 2017-07-21 | Stop reason: CLARIF

## 2017-07-21 RX ORDER — ONDANSETRON 2 MG/ML
4 INJECTION INTRAMUSCULAR; INTRAVENOUS EVERY 6 HOURS PRN
Status: DISCONTINUED | OUTPATIENT
Start: 2017-07-21 | End: 2017-07-22 | Stop reason: HOSPADM

## 2017-07-21 RX ORDER — POTASSIUM CHLORIDE 1.5 G/1.58G
20-40 POWDER, FOR SOLUTION ORAL
Status: DISCONTINUED | OUTPATIENT
Start: 2017-07-21 | End: 2017-07-22 | Stop reason: HOSPADM

## 2017-07-21 RX ORDER — ONDANSETRON 4 MG/1
4 TABLET, ORALLY DISINTEGRATING ORAL EVERY 6 HOURS PRN
Status: DISCONTINUED | OUTPATIENT
Start: 2017-07-21 | End: 2017-07-22 | Stop reason: HOSPADM

## 2017-07-21 RX ORDER — POTASSIUM CHLORIDE 1500 MG/1
20-40 TABLET, EXTENDED RELEASE ORAL
Status: DISCONTINUED | OUTPATIENT
Start: 2017-07-21 | End: 2017-07-22 | Stop reason: HOSPADM

## 2017-07-21 RX ORDER — POTASSIUM CL/LIDO/0.9 % NACL 10MEQ/0.1L
10 INTRAVENOUS SOLUTION, PIGGYBACK (ML) INTRAVENOUS
Status: DISCONTINUED | OUTPATIENT
Start: 2017-07-21 | End: 2017-07-22 | Stop reason: HOSPADM

## 2017-07-21 RX ORDER — PROCHLORPERAZINE MALEATE 5 MG
5-10 TABLET ORAL EVERY 6 HOURS PRN
Status: DISCONTINUED | OUTPATIENT
Start: 2017-07-21 | End: 2017-07-22 | Stop reason: HOSPADM

## 2017-07-21 RX ORDER — NALOXONE HYDROCHLORIDE 0.4 MG/ML
.1-.4 INJECTION, SOLUTION INTRAMUSCULAR; INTRAVENOUS; SUBCUTANEOUS
Status: DISCONTINUED | OUTPATIENT
Start: 2017-07-21 | End: 2017-07-22 | Stop reason: HOSPADM

## 2017-07-21 RX ORDER — OXYCODONE HYDROCHLORIDE 5 MG/1
5-10 TABLET ORAL
Status: DISCONTINUED | OUTPATIENT
Start: 2017-07-21 | End: 2017-07-22 | Stop reason: HOSPADM

## 2017-07-21 RX ORDER — SODIUM CHLORIDE 9 MG/ML
1000 INJECTION, SOLUTION INTRAVENOUS CONTINUOUS
Status: DISCONTINUED | OUTPATIENT
Start: 2017-07-21 | End: 2017-07-22

## 2017-07-21 RX ORDER — HYDROMORPHONE HYDROCHLORIDE 1 MG/ML
.3-.5 INJECTION, SOLUTION INTRAMUSCULAR; INTRAVENOUS; SUBCUTANEOUS
Status: DISCONTINUED | OUTPATIENT
Start: 2017-07-21 | End: 2017-07-22 | Stop reason: HOSPADM

## 2017-07-21 RX ORDER — PROCHLORPERAZINE 25 MG
25 SUPPOSITORY, RECTAL RECTAL EVERY 12 HOURS PRN
Status: DISCONTINUED | OUTPATIENT
Start: 2017-07-21 | End: 2017-07-22 | Stop reason: HOSPADM

## 2017-07-21 RX ORDER — POTASSIUM CHLORIDE 7.45 MG/ML
10 INJECTION INTRAVENOUS
Status: DISCONTINUED | OUTPATIENT
Start: 2017-07-21 | End: 2017-07-22 | Stop reason: HOSPADM

## 2017-07-21 RX ORDER — ATENOLOL 25 MG/1
100 TABLET ORAL DAILY
Status: DISCONTINUED | OUTPATIENT
Start: 2017-07-22 | End: 2017-07-22 | Stop reason: HOSPADM

## 2017-07-21 RX ADMIN — LIDOCAINE HYDROCHLORIDE 30 ML: 20 SOLUTION ORAL; TOPICAL at 19:41

## 2017-07-21 RX ADMIN — SODIUM CHLORIDE 1000 ML: 9 INJECTION, SOLUTION INTRAVENOUS at 23:40

## 2017-07-21 RX ADMIN — HYDROMORPHONE HYDROCHLORIDE 0.5 MG: 1 INJECTION, SOLUTION INTRAMUSCULAR; INTRAVENOUS; SUBCUTANEOUS at 23:40

## 2017-07-21 RX ADMIN — HYDROCODONE BITARTRATE AND ACETAMINOPHEN 2 TABLET: 5; 325 TABLET ORAL at 20:07

## 2017-07-21 ASSESSMENT — ENCOUNTER SYMPTOMS
DIARRHEA: 0
VOMITING: 0
ABDOMINAL PAIN: 1
CHEST TIGHTNESS: 0
CONSTIPATION: 0
SHORTNESS OF BREATH: 0
FEVER: 0
NAUSEA: 0
ANAL BLEEDING: 0
APPETITE CHANGE: 1
COUGH: 0
BLOOD IN STOOL: 0

## 2017-07-21 NOTE — IP AVS SNAPSHOT
[facility] Observation Department    [address]    [facility] [address]    Phone:  [phone]                                       After Visit Summary   7/21/2017    Luis Yates    MRN: [id number]           After Visit Summary Signature Page     I have received my discharge instructions, and my questions have been answered. I have discussed any challenges I see with this plan with the nurse or doctor.    ..........................................................................................................................................  Patient/Patient Representative Signature      ..........................................................................................................................................  Patient Representative Print Name and Relationship to Patient    ..................................................               ................................................  Date                                            Time    ..........................................................................................................................................  Reviewed by Signature/Title    ...................................................              ..............................................  Date                                                            Time

## 2017-07-21 NOTE — IP AVS SNAPSHOT
MRN:[id number]                      After Visit Summary   7/21/2017    Luis Yates    MRN: [id number]           Thank you!     Thank you for choosing [facility] for your care. Our goal is always to provide you with excellent care. Hearing back from our patients is one way we can continue to improve our services. Please take a few minutes to complete the written survey that you may receive in the mail after you visit. If you would like to speak to someone directly about your visit please contact Patient Relations at [phone]. Thank you!          Patient Information     Date Of Birth          1960        About your hospital stay     You were admitted on:  July 21, 2017 You last received care in the:  [facility] Observation Department    You were discharged on:  July 22, 2017        Reason for your hospital stay       Hyponatremia (low sodium), abdominal pain, duodenitis                  Who to Call     For medical emergencies, please call 911.  For non-urgent questions about your medical care, please call your primary care provider or clinic, [phone]  For questions related to your surgery, please call your surgery clinic        Attending Provider     Provider Poly Hanley MD Emergency Medicine    Dima Snider MD Internal Medicine       Primary Care Provider Office Phone # Fax #    PAYAL Baker [facility] [phone]      After Care Instructions     Activity       Your activity upon discharge: activity as tolerated            Diet       Follow this diet upon discharge: Advance to a regular diet as tolerated. Do not drink too much water. Limit fluids to 1 liter per day until your sodium normalizes.                  Follow-up Appointments     Follow-up and recommended labs and tests        Follow up with primary care provider, Carola Grullon, within 3 days for hospital follow- up.  The following  "labs/tests are recommended: chem 7 to check sodium levels. Follow-up on biopsy results (Carola Grullon can see these results)                  Your next 10 appointments already scheduled     Aug 01, 2017  7:00 AM CDT   SHORT with PAYAL Baker CNP   [facility] ([facility])    [address]  [facility] [address]   [phone]            Aug 02, 2017   Procedure with Darrick Moseley MD   [facility] ([facility])    [address]  [facility] [address]   [phone]           [facility] is located at [address] [place]                         Pending Results     Date and Time Order Name Status Description    7/22/2017 1037 Surgical pathology exam In process     7/21/2017 2057 EKG 12 lead Preliminary     7/21/2017 1901 EKG 12 lead Preliminary             Statement of Approval     Ordered          07/22/17 1526  I have reviewed and agree with all the recommendations and orders detailed in this document.  EFFECTIVE NOW     Approved and electronically signed by:  Soco Roth PA-C             Admission Information     Date & Time Provider Department Dept. Phone    7/21/2017 Dima Snider MD [facility] Observation Department [phone]      Your Vitals Were     Blood Pressure Pulse Temperature Respirations Height Weight    135/74 (BP Location: Right arm) 63 98.3  F (36.8  C) (Oral) 16 1.575 m (5' 2\") 67.7 kg (149 lb 4.8 oz)    Pulse Oximetry BMI (Body Mass Index)                100% 27.31 kg/m2          Support[employer] Information     Supportie lets you send messages to your doctor, view your test results, renew your prescriptions, schedule appointments and more. To sign up, go to [email or web address] . Click on \"Log in\" on the left side of the screen, which will take you to the Welcome page. Then click on \"Sign up Now\" on the right side of the page.     You will be asked to " enter the access code listed below, as well as some personal information. Please follow the directions to create your username and password.     Your access code is: [email or web address]  Expires: 2017  9:27 AM     Your access code will  in 90 days. If you need help or a new code, please call your [place] clinic or [phone].        Care EveryWhere ID     This is your Care EveryWhere ID. This could be used by other organizations to access your [place] medical records  [id number]        Equal Access to Services     : Hadii marlene florence Soalexus, waaxda luqadaha, qaybta kaalmajoanie ford, madison mojica . So [facility] [phone].    ATENCIÓN: Si habla español, tiene a urban disposición servicios gratuitos de asistencia lingüística. Llame al [phone].    We comply with applicable federal civil rights laws and [place] laws. We do not discriminate on the basis of race, color, national origin, age, disability sex, sexual orientation or gender identity.               Review of your medicines      START taking        Dose / Directions    losartan 50 MG tablet   Commonly known as:  COZAAR   Used for:  HTN (hypertension), benign        Dose:  100 mg   Take 2 tablets (100 mg) by mouth daily   Quantity:  30 tablet   Refills:  1       ondansetron 4 MG ODT tab   Commonly known as:  ZOFRAN-ODT   Used for:  Abdominal pain, epigastric        Dose:  4 mg   Take 1 tablet (4 mg) by mouth every 6 hours as needed for nausea or vomiting   Quantity:  20 tablet   Refills:  0       sucralfate 1 GM/10ML suspension   Commonly known as:  CARAFATE   Used for:  Abdominal pain, epigastric        Dose:  1 g   Take 10 mLs (1 g) by mouth 4 times daily as needed   Quantity:  1200 mL   Refills:  0         CONTINUE these medicines which have NOT CHANGED        Dose / Directions    atenolol 50 MG tablet   Commonly known as:  TENORMIN   Used for:  Essential hypertension        Dose:  100 mg   Take 2  tablets (100 mg) by mouth daily   Quantity:  180 tablet   Refills:  1       omeprazole 20 MG CR capsule   Commonly known as:  priLOSEC   Used for:  Gastroesophageal reflux disease without esophagitis        Dose:  20 mg   Take 1 capsule (20 mg) by mouth 2 times daily   Quantity:  60 capsule   Refills:  1         STOP taking     losartan-hydrochlorothiazide 100-25 MG per tablet   Commonly known as:  HYZAAR           ranitidine 150 MG tablet   Commonly known as:  ZANTAC                Where to get your medicines      Some of these will need a paper prescription and others can be bought over the counter. Ask your nurse if you have questions.     Bring a paper prescription for each of these medications     losartan 50 MG tablet    ondansetron 4 MG ODT tab    sucralfate 1 GM/10ML suspension                Protect others around you: Learn how to safely use, store and throw away your medicines at www.disposemymeds.org.             Medication List: This is a list of all your medications and when to take them. Check marks below indicate your daily home schedule. Keep this list as a reference.      Medications           Morning Afternoon Evening Bedtime As Needed    atenolol 50 MG tablet   Commonly known as:  TENORMIN   Take 2 tablets (100 mg) by mouth daily   Last time this was given:  100 mg on 7/22/2017 12:23 PM                                losartan 50 MG tablet   Commonly known as:  COZAAR   Take 2 tablets (100 mg) by mouth daily   Last time this was given:  100 mg on 7/22/2017 12:23 PM                                omeprazole 20 MG CR capsule   Commonly known as:  priLOSEC   Take 1 capsule (20 mg) by mouth 2 times daily   Last time this was given:  20 mg on 7/22/2017 12:43 AM                                ondansetron 4 MG ODT tab   Commonly known as:  ZOFRAN-ODT   Take 1 tablet (4 mg) by mouth every 6 hours as needed for nausea or vomiting   Last time this was given:  4 mg on 7/22/2017  1:32 PM                                 sucralfate 1 GM/10ML suspension   Commonly known as:  CARAFATE   Take 10 mLs (1 g) by mouth 4 times daily as needed   Last time this was given:  1 g on 7/22/2017 12:26 PM

## 2017-07-22 VITALS
SYSTOLIC BLOOD PRESSURE: 135 MMHG | OXYGEN SATURATION: 100 % | WEIGHT: 149.3 LBS | DIASTOLIC BLOOD PRESSURE: 74 MMHG | HEART RATE: 63 BPM | TEMPERATURE: 98.3 F | HEIGHT: 62 IN | BODY MASS INDEX: 27.47 KG/M2 | RESPIRATION RATE: 16 BRPM

## 2017-07-22 LAB
ALBUMIN UR-MCNC: NEGATIVE MG/DL
ANION GAP SERPL CALCULATED.3IONS-SCNC: 8 MMOL/L (ref 3–14)
APPEARANCE UR: CLEAR
BILIRUB UR QL STRIP: NEGATIVE
BUN SERPL-MCNC: 9 MG/DL (ref 7–30)
CALCIUM SERPL-MCNC: 8.2 MG/DL (ref 8.5–10.1)
CHLORIDE SERPL-SCNC: 86 MMOL/L (ref 94–109)
CO2 SERPL-SCNC: 28 MMOL/L (ref 20–32)
COLOR UR AUTO: YELLOW
CREAT SERPL-MCNC: 0.52 MG/DL (ref 0.66–1.25)
GFR SERPL CREATININE-BSD FRML MDRD: ABNORMAL ML/MIN/1.7M2
GLUCOSE SERPL-MCNC: 112 MG/DL (ref 70–99)
GLUCOSE UR STRIP-MCNC: NEGATIVE MG/DL
HGB UR QL STRIP: NEGATIVE
KETONES UR STRIP-MCNC: 20 MG/DL
LEUKOCYTE ESTERASE UR QL STRIP: NEGATIVE
MUCOUS THREADS #/AREA URNS LPF: PRESENT /LPF
NITRATE UR QL: NEGATIVE
OSMOLALITY UR: 433 MMOL/KG (ref 100–1200)
PH UR STRIP: 5 PH (ref 5–7)
POTASSIUM SERPL-SCNC: 3.7 MMOL/L (ref 3.4–5.3)
RBC #/AREA URNS AUTO: <1 /HPF (ref 0–2)
SODIUM SERPL-SCNC: 122 MMOL/L (ref 133–144)
SODIUM SERPL-SCNC: 122 MMOL/L (ref 133–144)
SODIUM UR-SCNC: 68 MMOL/L
SP GR UR STRIP: 1.01 (ref 1–1.03)
URN SPEC COLLECT METH UR: ABNORMAL
UROBILINOGEN UR STRIP-MCNC: 0 MG/DL (ref 0–2)
WBC #/AREA URNS AUTO: <1 /HPF (ref 0–2)

## 2017-07-22 PROCEDURE — 83935 ASSAY OF URINE OSMOLALITY: CPT | Performed by: EMERGENCY MEDICINE

## 2017-07-22 PROCEDURE — 43239 EGD BIOPSY SINGLE/MULTIPLE: CPT | Performed by: INTERNAL MEDICINE

## 2017-07-22 PROCEDURE — 81001 URINALYSIS AUTO W/SCOPE: CPT | Performed by: EMERGENCY MEDICINE

## 2017-07-22 PROCEDURE — 88342 IMHCHEM/IMCYTCHM 1ST ANTB: CPT | Performed by: INTERNAL MEDICINE

## 2017-07-22 PROCEDURE — 25000125 ZZHC RX 250: Performed by: INTERNAL MEDICINE

## 2017-07-22 PROCEDURE — 40000104 ZZH STATISTIC MODERATE SEDATION < 10 MIN: Performed by: INTERNAL MEDICINE

## 2017-07-22 PROCEDURE — 96361 HYDRATE IV INFUSION ADD-ON: CPT

## 2017-07-22 PROCEDURE — 25000125 ZZHC RX 250: Performed by: HOSPITALIST

## 2017-07-22 PROCEDURE — 25000125 ZZHC RX 250: Performed by: PHYSICIAN ASSISTANT

## 2017-07-22 PROCEDURE — 84300 ASSAY OF URINE SODIUM: CPT | Performed by: EMERGENCY MEDICINE

## 2017-07-22 PROCEDURE — 36415 COLL VENOUS BLD VENIPUNCTURE: CPT | Performed by: INTERNAL MEDICINE

## 2017-07-22 PROCEDURE — 36415 COLL VENOUS BLD VENIPUNCTURE: CPT | Performed by: HOSPITALIST

## 2017-07-22 PROCEDURE — 25000128 H RX IP 250 OP 636: Performed by: INTERNAL MEDICINE

## 2017-07-22 PROCEDURE — 88342 IMHCHEM/IMCYTCHM 1ST ANTB: CPT | Mod: 26 | Performed by: INTERNAL MEDICINE

## 2017-07-22 PROCEDURE — 96375 TX/PRO/DX INJ NEW DRUG ADDON: CPT

## 2017-07-22 PROCEDURE — 80048 BASIC METABOLIC PNL TOTAL CA: CPT | Performed by: INTERNAL MEDICINE

## 2017-07-22 PROCEDURE — 88305 TISSUE EXAM BY PATHOLOGIST: CPT | Performed by: INTERNAL MEDICINE

## 2017-07-22 PROCEDURE — 25000132 ZZH RX MED GY IP 250 OP 250 PS 637: Performed by: INTERNAL MEDICINE

## 2017-07-22 PROCEDURE — 88305 TISSUE EXAM BY PATHOLOGIST: CPT | Mod: 26 | Performed by: INTERNAL MEDICINE

## 2017-07-22 PROCEDURE — 99217 ZZC OBSERVATION CARE DISCHARGE: CPT | Performed by: HOSPITALIST

## 2017-07-22 PROCEDURE — 84295 ASSAY OF SERUM SODIUM: CPT | Mod: 91 | Performed by: HOSPITALIST

## 2017-07-22 PROCEDURE — G0378 HOSPITAL OBSERVATION PER HR: HCPCS

## 2017-07-22 PROCEDURE — 25000132 ZZH RX MED GY IP 250 OP 250 PS 637: Performed by: HOSPITALIST

## 2017-07-22 RX ORDER — SUCRALFATE ORAL 1 G/10ML
1 SUSPENSION ORAL 4 TIMES DAILY PRN
Qty: 1200 ML | Refills: 0 | Status: SHIPPED | OUTPATIENT
Start: 2017-07-22 | End: 2017-08-10

## 2017-07-22 RX ORDER — FENTANYL CITRATE 50 UG/ML
25-50 INJECTION, SOLUTION INTRAMUSCULAR; INTRAVENOUS
Status: DISCONTINUED | OUTPATIENT
Start: 2017-07-22 | End: 2017-07-22 | Stop reason: HOSPADM

## 2017-07-22 RX ORDER — NALOXONE HYDROCHLORIDE 0.4 MG/ML
.1-.4 INJECTION, SOLUTION INTRAMUSCULAR; INTRAVENOUS; SUBCUTANEOUS
Status: DISCONTINUED | OUTPATIENT
Start: 2017-07-22 | End: 2017-07-22 | Stop reason: HOSPADM

## 2017-07-22 RX ORDER — LOSARTAN POTASSIUM 25 MG/1
50 TABLET ORAL DAILY
Status: DISCONTINUED | OUTPATIENT
Start: 2017-07-22 | End: 2017-07-22

## 2017-07-22 RX ORDER — LIDOCAINE 40 MG/G
CREAM TOPICAL
Status: DISCONTINUED | OUTPATIENT
Start: 2017-07-22 | End: 2017-07-22 | Stop reason: HOSPADM

## 2017-07-22 RX ORDER — SUCRALFATE ORAL 1 G/10ML
1 SUSPENSION ORAL
Status: DISCONTINUED | OUTPATIENT
Start: 2017-07-22 | End: 2017-07-22 | Stop reason: HOSPADM

## 2017-07-22 RX ORDER — LOSARTAN POTASSIUM 100 MG/1
100 TABLET ORAL DAILY
Status: DISCONTINUED | OUTPATIENT
Start: 2017-07-22 | End: 2017-07-22 | Stop reason: HOSPADM

## 2017-07-22 RX ORDER — ONDANSETRON 4 MG/1
4 TABLET, ORALLY DISINTEGRATING ORAL EVERY 6 HOURS PRN
Qty: 20 TABLET | Refills: 0 | Status: SHIPPED | OUTPATIENT
Start: 2017-07-22 | End: 2022-02-22

## 2017-07-22 RX ORDER — LABETALOL HYDROCHLORIDE 5 MG/ML
10 INJECTION, SOLUTION INTRAVENOUS
Status: DISCONTINUED | OUTPATIENT
Start: 2017-07-22 | End: 2017-07-22 | Stop reason: HOSPADM

## 2017-07-22 RX ORDER — LOSARTAN POTASSIUM 50 MG/1
50 TABLET ORAL DAILY
Qty: 30 TABLET | Refills: 1 | Status: SHIPPED | OUTPATIENT
Start: 2017-07-22 | End: 2017-07-22

## 2017-07-22 RX ORDER — LOSARTAN POTASSIUM 50 MG/1
100 TABLET ORAL DAILY
Qty: 30 TABLET | Refills: 1 | Status: SHIPPED | OUTPATIENT
Start: 2017-07-22 | End: 2017-08-10

## 2017-07-22 RX ORDER — FENTANYL CITRATE 50 UG/ML
INJECTION, SOLUTION INTRAMUSCULAR; INTRAVENOUS PRN
Status: DISCONTINUED | OUTPATIENT
Start: 2017-07-22 | End: 2017-07-22 | Stop reason: HOSPADM

## 2017-07-22 RX ORDER — FLUMAZENIL 0.1 MG/ML
0.2 INJECTION, SOLUTION INTRAVENOUS
Status: DISCONTINUED | OUTPATIENT
Start: 2017-07-22 | End: 2017-07-22 | Stop reason: HOSPADM

## 2017-07-22 RX ORDER — FENTANYL CITRATE 50 UG/ML
50-100 INJECTION, SOLUTION INTRAMUSCULAR; INTRAVENOUS
Status: DISCONTINUED | OUTPATIENT
Start: 2017-07-22 | End: 2017-07-22 | Stop reason: HOSPADM

## 2017-07-22 RX ADMIN — LIDOCAINE HYDROCHLORIDE 30 ML: 20 SOLUTION ORAL; TOPICAL at 14:07

## 2017-07-22 RX ADMIN — ONDANSETRON 4 MG: 4 TABLET, ORALLY DISINTEGRATING ORAL at 13:32

## 2017-07-22 RX ADMIN — OMEPRAZOLE 20 MG: 20 CAPSULE, DELAYED RELEASE ORAL at 00:43

## 2017-07-22 RX ADMIN — RANITIDINE HYDROCHLORIDE 150 MG: 150 TABLET, FILM COATED ORAL at 00:44

## 2017-07-22 RX ADMIN — LOSARTAN POTASSIUM 100 MG: 100 TABLET ORAL at 12:23

## 2017-07-22 RX ADMIN — SUCRALFATE 1 G: 1 SUSPENSION ORAL at 12:26

## 2017-07-22 RX ADMIN — PANTOPRAZOLE SODIUM 40 MG: 40 INJECTION, POWDER, FOR SOLUTION INTRAVENOUS at 08:15

## 2017-07-22 RX ADMIN — ATENOLOL 100 MG: 25 TABLET ORAL at 12:23

## 2017-07-22 NOTE — PLAN OF CARE
"Problem: Discharge Planning  Goal: Discharge Planning (Adult, OB, Behavioral, Peds)  Outcome: No Change  PRIMARY DIAGNOSIS: Epigastric discomfort, abdominal pain  OUTPATIENT/OBSERVATION GOALS TO BE MET BEFORE DISCHARGE:  1. Pain Status: Improved with IV protonix, but still present.      2. Return to near baseline physical activity: Yes      3. Cleared for discharge by consultants (if involved): No      Discharge Planner Nurse   Safe discharge environment identified: Yes  Barriers to discharge: No       Entered by: Cleo Boothe 07/22/2017 9:52 AM     Please review provider order for any additional goals.   Nurse to notify provider when observation goals have been met and patient is ready for discharge.      Patient is alert and oriented. Patient still complains of \"burning\" sensation in epigastric region and also some nausea, and some abdominal discomfort. Patient has ice pack that he has been applying to chest and says it helps, also patient was given IV protonix this am which he says helped a little. GI was consulted and did an EGD this am around 9:30, which showed duodenum inflamed and patient had biopsies taken.  Patient also says he feels dizzy. Patient's VSS. Patient has daughter in room. Patient had an  from 9-11am. Plan is to see how patient tolerates eating. Will continue to monitor.       "

## 2017-07-22 NOTE — PHARMACY-ADMISSION MEDICATION HISTORY
Admission medication history interview status for this patient is complete. See [facility] admission navigator for allergy information, prior to admission medications and immunization status.     Medication history interview source(s):Family  Medication history resources (including written lists, pill bottles, clinic record):Pill bottles   Primary [facility] [address]    Changes made to PTA medication list:  Added: None  Deleted: Losartan 50mg  Changed: None    Actions taken by pharmacist (provider contacted, etc):None     Additional medication history information:None    Medication reconciliation/reorder completed by provider prior to medication history? No    Do you take OTC medications (eg tylenol, ibuprofen, fish oil, eye/ear drops, etc)? N    Prior to Admission medications    Medication Sig Last Dose Taking? Auth Provider   losartan-hydrochlorothiazide (HYZAAR) 100-25 MG per tablet Take 1 tablet by mouth daily 7/21/2017 at am Yes Carola Grullon APRN CNP   omeprazole (PRILOSEC) 20 MG CR capsule Take 1 capsule (20 mg) by mouth 2 times daily 7/21/2017 at am Yes Carola Grullon APRN CNP   ranitidine (ZANTAC) 150 MG tablet Take 1 tablet (150 mg) by mouth 2 times daily for 10 days 7/21/2017 at am Yes Jocelyn Moreau MD   atenolol (TENORMIN) 50 MG tablet Take 2 tablets (100 mg) by mouth daily 7/21/2017 at am Yes Carola Grullon APRN CNP

## 2017-07-22 NOTE — PLAN OF CARE
Problem: Discharge Planning  Goal: Discharge Planning (Adult, OB, Behavioral, Peds)  ROOM # 223     Living Situation (if not independent, order SW consult): With daughter  Facility name:  : [facility] [phone], [place] [phone]     Activity level at baseline: Ind  Activity level on admit: SBA        Patient registered to observation; given Patient Bill of Rights; given the opportunity to ask questions about observation status and their plan of care.  Patient has been oriented to the observation room, bathroom and call light is in place.     Discussed discharge goals and expectations with patient/family.

## 2017-07-22 NOTE — H&P
[facility]         Hospitalist Observation Admission Note    Name: Luis Yates    MRN: [id number]          YOB: 1960    Age: 57 year old    Date of admission: 7/21/2017    Primary care provider: Carola Grullon  Admitting physician:Dima Vance        Luis Yates is a 57 year old  male with a significant past medical history of hypertension ,Gout and GERD who presents with abdominal pain for about a week.He has been visiting the ED for the 4th time for uncontrolled HTN and was on diuretics.  ED evaluation significant for hyponatremia.       #1.Hyponatremia: suspect multifactorial   -- diuresis, increased fluid intake ,work up pending   -- euvolemic     #2.Hypertension: recently started on HCTZ 7/16/17 as well as atenolol and losartan     #3.Abdominal pain : concern for PUD vs parasitic infection       Reason for admission:    Observation admission for monitoring and management of pain and hyponatremia     Observation Goals:    -- pain controlled   -- EGD completed   -- sodium improved               Plan     > Admission Status:Admit to observation     >Care plan:  -- admit to obs   -- IV saline ,monitor BMP   -- Follow urine lytes and OSMOLALITY   -- hold HCTZ  -- continue atenolol and losartan   -- may need medication titration   --GI cocktail,PPI   -- GI evaluation    -- send stool for ova of parasite (Patient from endemic area)    >Supportive care:IV fluids continued  Pain management: anxiolytics and oral narcotics  Nausea and vomiting control measures    >Diet:Keep NPO for now    >Activity:Advance activity as tolerated    >Education/Counseling :Discussed treatment plan with the patient    >Consults:Inpatient consult with gastroenterology    >VTE prophylactic measures:prophylaxis against venous thromboembolism    >Therapies:none       >Additional order  --Care plan discussed with the patient/family and agreed to care plan   --Patient will  be transferred to care of hospitalist attending for further evaluation and management as appropriate   --Old medical orders reviewed   --imaging result independently reviewed by me     (See orders placed for this visit by me )     - Home medication reviewed and will be continued as appropriate once pharmacy reconciliation is completed             Code Status:     Full Code         Disposition:       >anticipate discharge to home and Anticipate discharge in 1-2 days            Disclaimer: This note consists of symbols derived from keyboarding, dictation and/or voice recognition software. As a result, there may be errors in the script that have gone undetected. Please consider this when interpreting information found in this chart.             Chief Complaint:   Abdominal pain      History is obtained from the patient          History of Present Illness:   This patient is a 57 year old  male with a significant past medical history of HTN who presents with the following condition requiring a hospital admission:      Abdominal Pain  Patient complains of abdominal pain. The pain is described as cramping, and is 5/10 in intensity. The patient is experiencing epigastric pain with radiation to back. Onset was 2 weeks ago. Symptoms have been rapidly worsening. Aggravating factors: eating.  Alleviating factors: nothing. Associated symptoms: nausea, vomiting and melena. The patient denies diarrhea, fever, hematochezia and hematuria.                 Past Medical History:   Past Medical History:   Diagnosis Date     GERD (gastroesophageal reflux disease)      Gout      HTN (hypertension)      HTN (hypertension), benign             Past Surgical History:     Past Surgical History:   Procedure Laterality Date     HERNIA REPAIR  2010    left      HERNIA REPAIR  2010     LAPAROSCOPIC HERNIORRHAPHY INGUINAL BILATERAL Bilateral 6/1/2017    Procedure: LAPAROSCOPIC HERNIORRHAPHY INGUINAL BILATERAL;  LAPAROSCOPIC HERNIORRHAPHY INGUINAL  BILATERAL with mesh;  Surgeon: Pola Barbosa MD;  Location: [facility] OR             Social History:     Social History   Substance Use Topics     Smoking status: Never Smoker     Smokeless tobacco: Never Used     Alcohol use No             Family History:     Family History   Problem Relation Age of Onset     Unknown/Adopted Mother      Unknown/Adopted Father             Allergies:   No Known Allergies          Medications:         Prior to Admission medications    Medication Sig Last Dose Taking? Auth Provider   losartan-hydrochlorothiazide (HYZAAR) 100-25 MG per tablet Take 1 tablet by mouth daily 7/21/2017 at am Yes Carola Grullon APRN CNP   omeprazole (PRILOSEC) 20 MG CR capsule Take 1 capsule (20 mg) by mouth 2 times daily 7/21/2017 at am Yes Carola Grullon APRN CNP   ranitidine (ZANTAC) 150 MG tablet Take 1 tablet (150 mg) by mouth 2 times daily for 10 days 7/21/2017 at am Yes Jocelyn Moreau MD   atenolol (TENORMIN) 50 MG tablet Take 2 tablets (100 mg) by mouth daily 7/21/2017 at am Yes Carola Grullon APRN CNP          Review of Systems:     A Comprehensive greater than 10 system review of systems was carried out.  Pertinent positives and negatives are noted above in HPI.  Otherwise negative for contributory information.              Physical Exam:     Vitals were reviewed    Temp:  [97.8  F (36.6  C)] 97.8  F (36.6  C)  Pulse:  [63] 63  Heart Rate:  [54-63] 58  Resp:  [18] 18  BP: (114-176)/(64-89) 131/69  SpO2:  [93 %-100 %] 97 %    Constitutional:   awake, fatigued, alert, cooperative and no apparent distress   Eyes:   lids and lashes normal, pupils equal, round and reactive to light and conjunctiva normal   ENT:   normocepalic, without obvious abnormality, atramatic, oral pharynx with moist mucus membranes   Neck:   supple, symmetrical, trachea midline, skin normal and no stridor   Hematologic / Lymphatic:   no cervical lymphadenopathy and no supraclavicular lymphadenopathy    Back:   symmetric and no curvature   Lungs:   no increased work of breathing, good air exchange, no retractions and clear to auscultation   Cardiovascular:   normal apical pulses , normal S1 and S2 and no edema   Abdomen:   normal bowel sounds, soft, non-distended and tenderness noted diffusely     Musculoskeletal:Extermities   no lower extremity pitting edema present  there is no redness, warmth, or swelling of the joints  full range of motion noted   Neurologic:   Mental Status Exam:  Level of Alertness:   awake  Cranial Nerves:  cranial nerves II-XII are grossly intact  Motor Exam:  moves all extremities well and symmetrically   Neuropsychiatric:   General: normal, calm and normal eye contact   Skin:   no bruising or bleeding                  Data:       All laboratory and imaging data in the past 24 hours reviewed       No results found for this or any previous visit (from the past 48 hour(s)).    EKG results: Performed today  Normal sinus rhythm         All imaging studies reviewed by me.       Patient`s old medical records reviewed and case discussed with the ED physician.    ED course-Reviewed

## 2017-07-22 NOTE — PLAN OF CARE
"Problem: Discharge Planning  Goal: Discharge Planning (Adult, OB, Behavioral, Peds)  Outcome: No Change  PRIMARY DIAGNOSIS: Epigastric discomfort, abdominal pain  OUTPATIENT/OBSERVATION GOALS TO BE MET BEFORE DISCHARGE:  1. Pain Status: Improved with IV protonix, but still present.     2. Return to near baseline physical activity: Yes     3. Cleared for discharge by consultants (if involved): No     Discharge Planner Nurse   Safe discharge environment identified: Yes  Barriers to discharge: No       Entered by: Cleo Boothe 07/22/2017 9:52 AM     Please review provider order for any additional goals.   Nurse to notify provider when observation goals have been met and patient is ready for discharge.     Patient is alert and oriented. Patient still complains of \"burning\" sensation in epigastric region and also some nausea, and some abdominal discomfort. Patient has ice pack that he has been applying to chest and says it helps, also patient was given IV protonix this am which he says helped a little. Patient is NPO no exceptions.  Patient also says he feels dizzy. Patient's VSS. Patient has daughter in room who has been interpreting for patient. Luis has been working on getting an  here for patient this am. Patient has GI consult in place. Will continue to monitor.      "

## 2017-07-22 NOTE — OR NURSING
Pt tolerated procedure will ,biopsy obtained from stomach by md  For h pylori, at bed side[ Luis Armando]

## 2017-07-22 NOTE — ED NOTES
C/O upper abdominal pain for past week. Pt seen in ED 3 days ago for same complaint. Pt reports no change in pain, so pt back in ED. Denies fevers. ABC in tact. A/OX3

## 2017-07-22 NOTE — ED NOTES
[facility]  ED Nurse Handoff Report    Luis Yates is a 57 year old male   ED Chief complaint: Abdominal Pain  . ED Diagnosis:   Final diagnoses:   Hyponatremia   Abdominal pain, epigastric     Allergies: No Known Allergies    Code Status: Full Code  Activity level - Baseline/Home:  Independent. Activity Level - Current:   Independent. Lift room needed: No. Bariatric: No   Needed: Yes   Isolation: No. Infection: Not Applicable.     Vital Signs:   Vitals:    07/21/17 2127 07/21/17 2128 07/21/17 2130 07/21/17 2145   BP:   131/69    Pulse:       Resp:       Temp:       TempSrc:       SpO2: 96% 96% 98% 97%   Weight:           Cardiac Rhythm:  ,      Pain level: 0-10 Pain Scale: 5  Patient confused: No. Patient Falls Risk: Yes.   Elimination Status: Has voided   Patient Report - Initial Complaint: Abdominal pain. Focused Assessment: Ongoing hx upper GI pain. Pt takes zantac and prilosec daily for GERD. Pt c/o ongoing pain upper GI, GI Cocktail decreased pain. Active BS, no pain on palpation. Dtr at bedside, translating. ABC in tact. A/Ox4, cooperative and not impulsive. Emesis x1 aprox 50 minutes after Norco given, pt diaphoretic for 20 minutes post emesis. Denies increase in pain. Tele SR.   Tests Performed: EKG, Labs. Abnormal Results:   Labs Ordered and Resulted from Time of ED Arrival Up to the Time of Departure from the ED   CBC WITH PLATELETS DIFFERENTIAL - Abnormal; Notable for the following:        Result Value    RBC Count 4.29 (*)     Hematocrit 38.8 (*)     All other components within normal limits   COMPREHENSIVE METABOLIC PANEL - Abnormal; Notable for the following:     Sodium 121 (*)     Chloride 82 (*)     Glucose 102 (*)     Creatinine 0.65 (*)     All other components within normal limits   LIPASE   TROPONIN I   OSMOLALITY   ROUTINE UA WITH MICROSCOPIC   SODIUM RANDOM URINE   OSMOLALITY URINE     .   Treatments provided: meds, monitoring  Family Comments: dtr at  bedside  OBS brochure/video discussed/provided to patient:  No  ED Medications:   Medications   lidocaine (viscous) (XYLOCAINE) 2 % 15 mL, alum & mag hydroxide-simethicone (MYLANTA ES/MAALOX  ES) 15 mL GI Cocktail (30 mLs Oral Given 7/21/17 1941)   HYDROcodone-acetaminophen (NORCO) 5-325 MG per tablet 2 tablet (2 tablets Oral Given 7/21/17 2007)     Drips infusing:  No         ED Nurse Name/Phone Number: Terri Lucas,   9:57 PM    RECEIVING UNIT ED HANDOFF REVIEW    Above ED Nurse Handoff Report was reviewed: Yes  Reviewed by: Lashanda Mosley on July 21, 2017 at 10:51 PM

## 2017-07-22 NOTE — ED PROVIDER NOTES
History     Chief Complaint:  Abdominal Pain    HPI   Luis Yates is a 57 year old male who presents with abdominal pain. The patient's daughter reports that he has been experiencing upper abdominal pain radiating through to his back and high blood pressure for the last 2 + weeks for which he has been seen several times both in the ED and in clinic. . The blood pressure has been consistently high and his abdominal pain has been intermittent for about 15 minutes at a time every one to two hours during this time. His pain is not worsened with exertion. His daughter reports that eating makes his pain worse so he has had a loss of appetite. He denies any breathing problems or abnormal stool.  He has been taking Omeprazole 20 mg twice a day for 3 days and Zantac twice a day with no alleviation of his symptoms. He is not currently taking any medication for pain. He recently had a stool sample taken for an ulcer which was negative.    Previous evaluations have included abdominal CT, RUQ US, chest CT, labs and EKG . He has also had a negative h pylori test from clinic. He moved to the  about 4 months ago.     Allergies:  No Known Drug Allergies    Medications:    Hyzaar  Prilosec  Zantac  Cozaar  Tenormin    Past Medical History:    GERD  Gout  Hypetension    Past Surgical History:    Hernia repair  Laparoscopic herniorrhaphy inguinal bilateral    Family History:    The patient was adopted.    Social History:  The patient was accompanied to the ED by his daughter.  Smoking Status: No  Smokeless Tobacco: No  Alcohol Use: No  Marital Status:        Review of Systems   Constitutional: Positive for appetite change. Negative for fever.   Respiratory: Negative for cough, chest tightness and shortness of breath.    Gastrointestinal: Positive for abdominal pain. Negative for anal bleeding, blood in stool, constipation, diarrhea, nausea and vomiting.   Genitourinary: Negative.      Physical Exam   Vitals:  Patient  Vitals for the past 24 hrs:   BP Temp Temp src Pulse Heart Rate Resp SpO2 Weight   07/21/17 2145 - - - - 58 - 97 % -   07/21/17 2130 131/69 - - - 56 - 98 % -   07/21/17 2128 - - - - 57 - 96 % -   07/21/17 2127 - - - - 55 - 96 % -   07/21/17 2126 - - - - 55 - 96 % -   07/21/17 2124 - - - - 54 - 97 % -   07/21/17 2123 - - - - 55 - 95 % -   07/21/17 2122 116/66 - - - 54 - 97 % -   07/21/17 2115 - - - - 54 - 97 % -   07/21/17 2100 114/64 - - - 55 - 98 % -   07/21/17 2055 - - - - 55 - 93 % -   07/21/17 2054 141/74 - - - 54 - 99 % -   07/21/17 2053 - - - - 54 - 99 % -   07/21/17 2052 - - - - 55 - 99 % -   07/21/17 2051 - - - - - - 97 % -   07/21/17 2050 146/89 - - - - - 99 % -   07/21/17 2030 164/89 - - - - - 98 % -   07/21/17 2024 161/86 - - - - - 96 % -   07/21/17 2023 176/88 - - - - - 98 % -   07/21/17 2015 - - - - - - 97 % -   07/21/17 1918 142/87 - - - - - 99 % -   07/21/17 1915 - - - - - - 100 % -   07/21/17 1906 173/86 - - - - - - -   07/21/17 1903 - 97.8  F (36.6  C) Oral 63 63 18 98 % 72.6 kg (160 lb)     Physical Exam    Nursing note and vitals reviewed.    Constitutional: Pleasant and well groomed.          HENT:    Mouth/Throat: Oropharynx is without swelling or erythema. Oral mucosa moist.    Eyes: Conjunctivae normal are normal. No scleral icterus.    Neck: Neck supple.   Cardiovascular: Normal rate, regular rhythm and intact distal pulses.    Pulmonary/Chest: Effort normal and breath sounds normal.   Abdominal: Soft.  No distension. Epigastric tenderness without guarding or rebound.   Musculoskeletal:  No edema, No calf tenderness  Neurological:Alert and oriented. Answering questions appropriately. Upper and lower extremity strength intact throughout. . Coordination normal.   Skin: Skin is warm and dry.   Psychiatric: Normal mood and affect.     Emergency Department Course     ECG:  ECG taken at 1909, ECG read at 1949  Normal sinus rhythm  Normal ECG  Rate 60 bpm. MO interval 158. QRS duration 92. QT/QTc  412/412. P-R-T axes 77 48 51  No significant change from 7/16/17.    ECG taken at 2059, ECG read at 2104  Sinus bradycardia  Otherwise normal ECG  Rate 56 bpm. VA interval 154. QRS duration 84. QT/QTc 430/414. P-R-T axes 64 32 33.    Laboratory:  Laboratory findings were communicated with the patient who voiced understanding of the findings.  CBC:  AWNL. (WBC 5.6, HGB 13.6, )   CMP: NA: 121 (L). Chloride: 82 (L), Glucose: 102 (H), Creat: 0.6 (L) o/w WNL  Lipase: 192  Troponin (Collected 2020): <0.015  UA: Pending  Osmolality urine: Pending    Interventions:  1941 GI Cocktail (Maalox/Mylanta and viscous Lidocaine), 30 mL suspension, PO   2007 Norco, 2 325 mg tablets, PO     Emergency Department Course:  Nursing notes and vitals reviewed.  I performed an exam of the patient as documented above.   1947 I had my initial encounter with the patient.  Phone  offered however patient preferred family/friend.   IV was inserted and blood was drawn for laboratory testing, results above.  The patient was updated on the results of their lab tests.   The patient is nauseated, vomiting, and diaphoretic. His pain was getting better prior to these symptoms which occurred after the norco. .    I discussed the treatment plan with the patient. They expressed understanding of this plan and consented to admission. I discussed the patient with Tylor, who will admit the patient to a monitored bed for further evaluation and treatment.    I personally reviewed the laboratory results with the Patient and answered all related questions prior to discharge.    Impression & Plan      Medical Decision Making:  Luis Yates is a 57 year old male who presents to the emergency department today with intermittent epigastric pain described above. This is his third ED visit for this issue this month. He has additionally been seen in clinic twice. He's had CT scans, chest X Rays, CTs of his chest, EKGs, Ultrasound of his  gallbladder, and labs which have in general been unremarkable. He presented today for evaluation due to continued symptoms despite being treated with Omeprazole and Zantac. Differential diagnosis included, but was not limited to, acute coronary syndrome, angina, peptic ulcer disease, less likely since he's had a negative workup in the past biliary cholic or pancreatitis. The evaluation is as noted above. He had some improvement with the GI cocktail. I then gave him a dose of Norco for pain which he later vomited, became very diaphoretic, then had recurrence of his pain. Repeat EKG showed no significant change from previous. Labs returned showing significant hyponatremia. He had multiple BNPs with sodiums in the 140s and today is 121. In talking with him further it does seem as if he's been drinking water possibly to excess, but usually 10 small bottles a day as he was told to drink plenty of water. Also he was recently started on hydrochlorothiazide. I feel due to the rapid nature of the hyponatremia and his poorly controled symptoms I feel transfer to observation is warranted to monitor the sodium and be certain this is improving with fluid restriction possibly withdrawal of the hydrochlorothiazide. Urine, sodium, and osoms are pending at this time.     Diagnosis:    ICD-10-CM    1. Hyponatremia E87.1 Osmolality     Osmolality     CANCELED: Osmolality   2. Abdominal pain, epigastric R10.13      Disposition:   Admission    Scribe Disclosure:  IHSAN, Efrain Gonsales, am serving as a scribe at 7:24 PM on 7/21/2017 to document services personally performed by Poly Kaplan*, based on my observations and the provider's statements to me.    7/21/2017   [facility] EMERGENCY DEPARTMENT       Poly Kaplan MD  07/22/17 [id number]

## 2017-07-22 NOTE — PLAN OF CARE
Problem: Discharge Planning  Goal: Discharge Planning (Adult, OB, Behavioral, Peds)  Outcome: Adequate for Discharge Date Met:  07/22/17  Patient's After Visit Summary was reviewed with patient and/or Daughter.   Patient verbalized understanding of After Visit Summary, recommended follow up and was given an opportunity to ask questions.   Discharge medications sent home with patient/family: YES, carafate, zofran, and Losartan    Discharged with daughter     Family educated to stop taking zantac and HCTZ. Family and patient also instructed to decrease water intake to 1L of fluid a day. Family and patient educated to make a follow up appointment with primary care physician on Monday to get biopsy results and Chem 7 drawn to check NA.

## 2017-07-22 NOTE — PLAN OF CARE
"Problem: Discharge Planning  Goal: Discharge Planning (Adult, OB, Behavioral, Peds)  Outcome: No Change  PRIMARY DIAGNOSIS: Hyponatremia, Abd pain  OUTPATIENT/OBSERVATION GOALS TO BE MET BEFORE DISCHARGE:  1. Pain Status: Improved but still requiring IV narcotics.     2. Return to near baseline physical activity: No     3. Cleared for discharge by consultants (if involved): No     Pt A&Ox 4, BP elevated otherwise VSS. Pt states \"medium\" pain, received dilaudid w/ good relief, pt did have sm emesis x1 after receiving dilaudid declined antiemetics and denied nausea after emesis. Pt does not speak english, daughter and son and law in room and is able to translate,  requested for morning. Pt states numbness in extremities, CMS intact. Need urine and stool sample. Pt SBA d/t generalized weakness and pain. Pt NPO for GI consult in AM. Pt sodium was 121, provider aware,pt is receiving NS will recheck in AM. Will continue to monitor.     Discharge Planner Nurse   Safe discharge environment identified: Yes  Barriers to discharge: Yes, GI consult, labs       Entered by: Bryan Guzman 07/22/2017 4:48 AM     Please review provider order for any additional goals.   Nurse to notify provider when observation goals have been met and patient is ready for discharge.      "

## 2017-07-22 NOTE — PLAN OF CARE
Problem: Discharge Planning  Goal: Discharge Planning (Adult, OB, Behavioral, Peds)  Outcome: No Change  ROOM # 223     Living Situation (if not independent, order SW consult):  Facility name:  : daughter Blaire     Activity level at baseline: ind  Activity level on admit: sba        Patient registered to observation; given Patient Bill of Rights; given the opportunity to ask questions about observation status and their plan of care.  Patient has been oriented to the observation room, bathroom and call light is in place.     Discussed discharge goals and expectations with patient/family.

## 2017-07-22 NOTE — H&P
Pre-Endoscopy History and Physical     Luis Yates MRN# [id number]   YOB: 1960 Age: 57 year old     Date of Procedure: 7/21/2017  Primary care provider: Carola Grullon  Type of Endoscopy: Gastroscopy with possible biopsy, possible dilation  Reason for Procedure: pain  Type of Anesthesia Anticipated: Conscious Sedation    HPI:    Luis is a 57 year old male who will be undergoing the above procedure.      A history and physical has been performed. The patient's medications and allergies have been reviewed. The risks and benefits of the procedure and the sedation options and risks were discussed with the patient.  All questions were answered and informed consent was obtained.      He denies a personal or family history of anesthesia complications or bleeding disorders.     Patient Active Problem List   Diagnosis     HTN (hypertension), benign     Gout     Hypercholesteremia     Hyponatremia        Past Medical History:   Diagnosis Date     GERD (gastroesophageal reflux disease)      Gout      HTN (hypertension)      HTN (hypertension), benign         Past Surgical History:   Procedure Laterality Date     HERNIA REPAIR  2010    left      HERNIA REPAIR  2010     LAPAROSCOPIC HERNIORRHAPHY INGUINAL BILATERAL Bilateral 6/1/2017    Procedure: LAPAROSCOPIC HERNIORRHAPHY INGUINAL BILATERAL;  LAPAROSCOPIC HERNIORRHAPHY INGUINAL BILATERAL with mesh;  Surgeon: Pola Barbosa MD;  Location:  OR       Social History   Substance Use Topics     Smoking status: Never Smoker     Smokeless tobacco: Never Used     Alcohol use No       Family History   Problem Relation Age of Onset     Unknown/Adopted Mother      Unknown/Adopted Father        Prior to Admission medications    Medication Sig Start Date End Date Taking? Authorizing Provider   losartan-hydrochlorothiazide (HYZAAR) 100-25 MG per tablet Take 1 tablet by mouth daily 7/18/17  Yes Carola Grullon APRN CNP   omeprazole (PRILOSEC) 20 MG  "CR capsule Take 1 capsule (20 mg) by mouth 2 times daily 7/18/17  Yes Carola Grullon APRN CNP   ranitidine (ZANTAC) 150 MG tablet Take 1 tablet (150 mg) by mouth 2 times daily for 10 days 7/14/17 7/24/17 Yes Jocelyn Moreau MD   atenolol (TENORMIN) 50 MG tablet Take 2 tablets (100 mg) by mouth daily 5/23/17  Yes Carola Grullon APRN CNP       No Known Allergies     REVIEW OF SYSTEMS:   5 point ROS negative except as noted above in HPI, including Gen., Resp., CV, GI &  system review.    PHYSICAL EXAM:   /89  Pulse 63  Temp 98.2  F (36.8  C) (Oral)  Resp 16  Ht 1.575 m (5' 2\")  Wt 67.7 kg (149 lb 4.8 oz)  SpO2 96%  BMI 27.31 kg/m2 Estimated body mass index is 27.31 kg/(m^2) as calculated from the following:    Height as of this encounter: 1.575 m (5' 2\").    Weight as of this encounter: 67.7 kg (149 lb 4.8 oz).   GENERAL APPEARANCE: alert, and oriented  MENTAL STATUS: alert  AIRWAY EXAM: Mallampatti Class I (visualization of the soft palate, fauces, uvula, anterior and posterior pillars)  RESP: lungs clear to auscultation - no rales, rhonchi or wheezes  CV: regular rates and rhythm  DIAGNOSTICS:    Not indicated    IMPRESSION   ASA Class 2 - Mild systemic disease    PLAN:   Plan for Gastroscopy with possible biopsy, possible dilation. We discussed the risks, benefits and alternatives and the patient wished to proceed.    The above has been forwarded to the consulting provider.      Signed Electronically by: Darrick Moseley  July 22, 2017          "

## 2017-07-22 NOTE — OR NURSING
Pt recovery completed, pt transferred back to his primary care floor ,full report given to tonja coates primary care rn ,also the endoscopy discharged instruction explained to pt and daughter via  ,pt denies pain and transferred back to his hospital bed in stable condition

## 2017-07-22 NOTE — PROGRESS NOTES
"[facility]    Hospitalist Progress Note    Date of Service (when I saw the patient): 07/22/2017    Assessment & Plan   Lusi Yates is a 57 year old male with HTN, gout, GERD who was admitted on 7/21/2017 with abdominal pain    1. Neuro- pain control as below  2. CVS- HTN- cont home meds (stopped HCTZ, cont atenolol and losartan)  3. Pulm- no issues  4. GI- abdominal pain. Has had full work-up with recent ED visits including CT and RUQ US. All normal. EGD done today which shows some gastritis. Will continue PPI. Try carafate. Follow-up on biopsy results as an outpt. Advance to regular diet  5. ID- no issues  6. Renal- hyponatremia. Was recently started on HCTZ and gives history of drinking \"a lot\" of water. Likely Na did not improved overnight because he was given IVF. Now stopped.   7. Endo- not diabetic  8. Heme/onc- no issues  9. Musculoskel- ambulate  10. Prophylaxis- start if has prolonged stay  11. Dispo- patient wants to DC home. He currently has some abdominal pain after eating. Would like to try GI cocktail. If pain controlled, he would like to DC home. I indicated I would like to see his Na come up. He would DC with losartan only (HCTZ stopped, new script given because patient was on Hyzar). He will decrease his water intake and have labs done on Monday. I indicated he would need to be sure he followed these instructions to DC home. He agreed.       Code Status: Full Code    Disposition: Expected discharge today or tomorrow.   Blas Best    Interval History   Patient had no pain after EGD. All symptoms gone and wanted to DC home. I had him eat first. He then had some nausea and some pain. He still would like to DC home if his symptoms could be controlled.    -Data reviewed today: I reviewed all new labs and imaging results over the last 24 hours. I personally reviewed no images or EKG's today.    Physical Exam   Temp: 98.2  F (36.8  C) Temp src: Oral BP: 133/75 Pulse: 63 Heart Rate: " 70 Resp: 15 SpO2: 97 % O2 Device: None (Room air) Oxygen Delivery: 1 LPM  Vitals:    07/21/17 1903 07/21/17 2315   Weight: 72.6 kg (160 lb) 67.7 kg (149 lb 4.8 oz)     Vital Signs with Ranges  Temp:  [97.7  F (36.5  C)-98.3  F (36.8  C)] 98.2  F (36.8  C)  Pulse:  [63] 63  Heart Rate:  [54-85] 70  Resp:  [10-18] 15  BP: (114-190)/(64-96) 133/75  SpO2:  [84 %-100 %] 97 %       Constitutional: Awake, alert, cooperative, no apparent distress   Respiratory: Clear to auscultation bilaterally, no crackles or wheezing   Cardiovascular: Regular rate and rhythm, normal S1 and S2, and no murmur noted   Abdomen: Normal bowel sounds, soft, non-distended, + epigastric tenderness   Skin: No rashes, no cyanosis, dry to touch   Neuro: Alert and oriented x3, no weakness, numbness, memory loss   Extremities: No edema, normal range of motion   Other(s):        All other systems: Negative     Medications     - MEDICATION INSTRUCTIONS -       - MEDICATION INSTRUCTIONS -       - MEDICATION INSTRUCTIONS -         pantoprazole  40 mg Intravenous QAM AC     sodium chloride (PF)  3 mL Intravenous Q8H     midazolam  1-2 mg Intravenous Once within 24 hrs     fentaNYL   mcg Intravenous Once within 24 hrs     sucralfate  1 g Oral 4x Daily AC & HS     omeprazole  20 mg Oral BID AC     losartan  100 mg Oral Daily     lidocaine viscous 2% 15 mL and maalox/mylanta w/ simethicone 15 mL (GI COCKTAIL)  30 mL Oral Once     atenolol  100 mg Oral Daily       Data     Recent Labs  Lab 07/22/17  1208 07/22/17  0648 07/21/17  2020 07/16/17  1510 07/16/17  1440   WBC  --   --  5.6  --  6.3   HGB  --   --  13.6  --  13.1*   MCV  --   --  90  --  97   PLT  --   --  239  --  205   INR  --   --   --   --  1.14   * 122* 121*  --  140   POTASSIUM  --  3.7 4.3  --  3.7   CHLORIDE  --  86* 82*  --  104   CO2  --  28 31  --  33*   BUN  --  9 10  --  9   CR  --  0.52* 0.65*  --  0.88   ANIONGAP  --  8 8  --  3   MARIXA  --  8.2* 8.7  --  8.5   GLC  --  112*  102*  --  104*   ALBUMIN  --   --  3.9  --  3.7   PROTTOTAL  --   --  7.4  --  7.2   BILITOTAL  --   --  0.3  --  0.3   ALKPHOS  --   --  61  --  56   ALT  --   --  22  --  21   AST  --   --  18  --  17   LIPASE  --   --  192  --  240   TROPI  --   --  <0.015The 99th percentile for upper reference range is 0.045 ug/L.  Troponin values in the range of 0.045 - 0.120 ug/L may be associated with risks of adverse clinical events.  --   --    TROPONIN  --   --   --  0.00  --        No results found for this or any previous visit (from the past 24 hour(s)).

## 2017-07-23 LAB — UPPER GI ENDOSCOPY: NORMAL

## 2017-07-23 NOTE — DISCHARGE SUMMARY
"[facility]  Discharge Summary  Name: Luis Yates    MRN: [id number]  YOB: 1960    Age: 57 year old  Date of Discharge:  7/22/2017  3:51 PM  Date of Admission: 7/21/2017  Primary Care Provider: Carola Grullon  Discharge Physician:  Blas Best MD  Discharging Service:  Hospitalist  Date of Service (when I saw the patient): 7/22/17    Discharge Diagnosis:  Abdominal pain, duodenitis     Other Diagnosis:  HTN, gout, GERD      Discharge Disposition:  Discharged to home     Allergies:  No Known Allergies     Discharge Medications:   Discharge Medication List as of 7/22/2017  3:31 PM      START taking these medications    Details   sucralfate (CARAFATE) 1 GM/10ML suspension Take 10 mLs (1 g) by mouth 4 times daily as needed, Disp-1200 mL, R-0, Local Print      ondansetron (ZOFRAN-ODT) 4 MG ODT tab Take 1 tablet (4 mg) by mouth every 6 hours as needed for nausea or vomiting, Disp-20 tablet, R-0, Local Print         CONTINUE these medications which have CHANGED    Details   losartan (COZAAR) 50 MG tablet Take 2 tablets (100 mg) by mouth daily, Disp-30 tablet, R-1, Local Print         CONTINUE these medications which have NOT CHANGED    Details   omeprazole (PRILOSEC) 20 MG CR capsule Take 1 capsule (20 mg) by mouth 2 times daily, Disp-60 capsule, R-1, E-Prescribe      atenolol (TENORMIN) 50 MG tablet Take 2 tablets (100 mg) by mouth daily, Disp-180 tablet, R-1, E-Prescribe         STOP taking these medications       losartan-hydrochlorothiazide (HYZAAR) 100-25 MG per tablet Comments:   Reason for Stopping:         ranitidine (ZANTAC) 150 MG tablet Comments:   Reason for Stopping:                Condition on Discharge:  Discharge condition: Stable   Discharge vitals: Blood pressure 135/74, pulse 63, temperature 98.3  F (36.8  C), temperature source Oral, resp. rate 16, height 1.575 m (5' 2\"), weight 67.7 kg (149 lb 4.8 oz), SpO2 100 %.   Code status on discharge: Full Code     History " of Illness:  See detailed admission note for full details.    57 year old  male with a significant past medical history of HTN who presents with the following condition requiring a hospital admission:        Abdominal Pain  Patient complains of abdominal pain. The pain is described as cramping, and is 5/10 in intensity. The patient is experiencing epigastric pain with radiation to back. Onset was 2 weeks ago. Symptoms have been rapidly worsening. Aggravating factors: eating.  Alleviating factors: nothing. Associated symptoms: nausea, vomiting and melena. The patient denies diarrhea, fever, hematochezia and hematuria.    Significant Physical Exam Findings:  Please see my Progress Note dated the same date    Procedures:  EGD     Imaging:  Results for orders placed or performed during the hospital encounter of 07/16/17   XR Chest 2 Views    Narrative    CHEST TWO VIEW   7/16/2017 3:53 PM     HISTORY: Chest pain.    COMPARISON: 7/12/2017    FINDINGS: Minimal strand of fibrosis or atelectasis right base  unchanged in the interval. Chest otherwise negative.      Impression    IMPRESSION: Minimal linear opacity right base but unchanged from  7/12/2017.    ZANDRA REYES MD   CT Head w/o Contrast    Narrative    CT SCAN OF THE HEAD WITHOUT CONTRAST   7/16/2017 3:47 PM     HISTORY: Left-sided tingling with right eyelid droop.    TECHNIQUE:  Axial images of the head and coronal reformations without  IV contrast material. Radiation dose for this scan was reduced using  automated exposure control, adjustment of the mA and/or kV according  to patient size, or iterative reconstruction technique.    COMPARISON: 7/12/2017    FINDINGS:  Mild parenchymal volume loss. Ventricles are not enlarged  and there is no hydrocephalus. Thin linear lucency in the right  temporal lobe is unchanged and may be sequela of previous infectious,  inflammatory, or ischemic insult. There is no evidence of intracranial  hemorrhage, mass, acute infarct  or anomaly.     The visualized portions of the sinuses and mastoids appear normal.  There is no evidence of trauma. There are bilateral intraocular lens  implants.      Impression    IMPRESSION:  Normal CT scan of the head.      SKYLAR CHEUNG MD   CT Abdomen Pelvis w/o Contrast    Narrative    CT ABDOMEN AND PELVIS WITHOUT CONTRAST  7/16/2017 3:46 PM    HISTORY:  Pain    TECHNIQUE: Scans obtained from the diaphragm through the pelvis  without oral or IV contrast.   Radiation dose for this scan was reduced using automated exposure  control, adjustment of the mA and/or kV according to patient size, or  iterative reconstruction technique.    COMPARISON:  Abdominal ultrasound dated 7/14/2017.    FINDINGS:   0.6 cm diameter noncalcified nodule right lateral costophrenic angle  is noted. Visualized portions of lung bases are otherwise clear. There  are coronary artery calcifications and/or stents. Aortic valve annular  calcifications are noted. There are thoracic aortic calcifications.  There are no aggressive osseous lesions.    The liver, gallbladder, pancreas, spleen, bilateral adrenal glands and  bilateral kidneys are grossly normal in appearance for a noncontrast  CT. No hydronephrosis, nephrolithiasis, hydroureter, or ureteral  calculus is identified. Small outpouching left side of urinary bladder  wall (image 66 series 2) could represent a small diverticulum. Urinary  bladder is otherwise unremarkable.    Prostate gland is enlarged and contains some dystrophic calcification.  Prostate gland is otherwise unremarkable.    No adenopathy, free fluid, or free air is seen in the peritoneal  cavity. There is nonaneurysmal atherosclerosis.    Small left inguinal hernia contains fat and fluid. Small umbilical  hernia contains adipose tissue and has an opening measuring up to 1.3  cm.    The colon is grossly of normal caliber. No pericolonic inflammatory  change to suggest diverticulitis. Appendix is normal in  appearance.  Small bowel is of normal caliber. Questionable thickening of the  dorsal gastric fundal wall (image 20 series 2) could represent fluid  or other ingested material or incomplete distention measuring up to  2.8 cm in thickness. Ingested material is otherwise seen in the  stomach. Stomach is otherwise unremarkable.      Impression    IMPRESSION:  1. Mild prominence of the gastric fundal wall superiorly likely  secondary to ingested material including distention. Infiltrative  process in the stomach is difficult to entirely exclude in the  appropriate setting.  2. Nonaneurysmal atherosclerosis.  3. Noncalcified nodule measuring up to 0.6 cm in the right lung base.  For an indeterminate lung nodule  >4-6 mm:  Low risk patients: Follow-up CT at 12 months; if unchanged, no further  follow-up.  High risk patients: Initial follow-up CT at 6-12 months then at 18-24  months if no change.    - Low Risk: Minimal or absent history of smoking and of other known  risk factors.  - Nonsolid (ground glass) or partly solid nodules may require longer  follow-up to exclude indolent adenocarcinoma.  - Fleischner Society Recommendations, Radiology 2005.    4. No evidence for renal or ureteral calculus.  5. Small left inguinal hernia containing fat and fluid.  6. Question small urinary bladder diverticulum.    LOREN BARROS MD        Consultations:  Consultation during this admission received from gastroenterology.     Significant Lab Results:    Recent Labs  Lab 07/21/17  2020 07/16/17  1440   WBC 5.6 6.3   HGB 13.6 13.1*   HCT 38.8* 39.5*   MCV 90 97    205          Lab Results   Component Value Date     07/22/2017     07/22/2017     07/21/2017    Lab Results   Component Value Date    CHLORIDE 86 07/22/2017    CHLORIDE 82 07/21/2017    CHLORIDE 104 07/16/2017    Lab Results   Component Value Date    BUN 9 07/22/2017    BUN 10 07/21/2017    BUN 9 07/16/2017      Lab Results   Component Value Date     "POTASSIUM 3.7 07/22/2017    POTASSIUM 4.3 07/21/2017    POTASSIUM 3.7 07/16/2017    Lab Results   Component Value Date    CO2 28 07/22/2017    CO2 31 07/21/2017    CO2 33 07/16/2017    Lab Results   Component Value Date    CR 0.52 07/22/2017    CR 0.65 07/21/2017    CR 0.88 07/16/2017          Recent Labs  Lab 07/21/17 2020 07/16/17  1440   AST 18 17   ALT 22 21   ALKPHOS 61 56   BILITOTAL 0.3 0.3       Recent Labs  Lab 07/21/17 2020 07/16/17  1440   LIPASE 192 240          Pending Results:  Unresulted Labs Ordered in the Past 30 Days of this Admission     Date and Time Order Name Status Description    7/22/2017 1037 Surgical pathology exam In process            Discharge Instructions and Follow-Up:   Discharge diet:   Active Diet Order      Diet   Discharge activity: Activity as tolerated   Discharge follow-up: Follow up with primary care provider in 5 days   Outpatient therapy: None    Home Care agency: None    Other instructions: Follow-up on biopsy results      Hospital Course:  Please see my Progress Note for the same date:  1. Neuro- pain control as below  2. CVS- HTN- cont home meds (stopped HCTZ, cont atenolol and losartan)  3. Pulm- no issues  4. GI- abdominal pain. Has had full work-up with recent ED visits including CT and RUQ US. All normal. EGD done today which shows some gastritis. Will continue PPI. Try carafate. Follow-up on biopsy results as an outpt. Advance to regular diet  5. ID- no issues  6. Renal- hyponatremia. Was recently started on HCTZ and gives history of drinking \"a lot\" of water. Likely Na did not improved overnight because he was given IVF. Now stopped.   7. Endo- not diabetic  8. Heme/onc- no issues  9. Musculoskel- ambulate  10. Prophylaxis- start if has prolonged stay  11. Dispo- patient wants to DC home. He currently has some abdominal pain after eating. Would like to try GI cocktail. If pain controlled, he would like to DC home. I indicated I would like to see his Na come up. He " would DC with losartan only (HCTZ stopped, new script given because patient was on Hyzar). He will decrease his water intake and have labs done on Monday. I indicated he would need to be sure he followed these instructions to DC home. He agreed.    Patient did well and DC'd later in the afternoon.     Total time spent in face to face contact with the patient and coordinating discharge was:  40 Minutes.    Blas Best MD  Pager: [phone]

## 2017-07-24 ENCOUNTER — TELEPHONE (OUTPATIENT)
Dept: INTERNAL MEDICINE | Facility: CLINIC | Age: 57
End: 2017-07-24

## 2017-07-24 LAB
INTERPRETATION ECG - MUSE: NORMAL
INTERPRETATION ECG - MUSE: NORMAL

## 2017-07-24 NOTE — TELEPHONE ENCOUNTER
IP F/U    Date: 07/22/17  Diagnosis: Hyponatremia, Htn, Benign  Is patient active in care coordination? No  Was patient in TCU? No    Next 5 appointments (look out 90 days)     Aug 01, 2017  7:00 AM CDT   SHORT with PAYAL Baker CNP   [facility]    [address]  [facility] [address]   [phone]

## 2017-07-25 ENCOUNTER — OFFICE VISIT (OUTPATIENT)
Dept: INTERNAL MEDICINE | Facility: CLINIC | Age: 57
End: 2017-07-25
Payer: COMMERCIAL

## 2017-07-25 VITALS
SYSTOLIC BLOOD PRESSURE: 136 MMHG | HEIGHT: 62 IN | BODY MASS INDEX: 27.42 KG/M2 | RESPIRATION RATE: 12 BRPM | WEIGHT: 149 LBS | OXYGEN SATURATION: 97 % | HEART RATE: 57 BPM | DIASTOLIC BLOOD PRESSURE: 68 MMHG | TEMPERATURE: 98.4 F

## 2017-07-25 DIAGNOSIS — I10 HTN (HYPERTENSION), BENIGN: ICD-10-CM

## 2017-07-25 DIAGNOSIS — E87.1 LOW SODIUM LEVELS: Primary | ICD-10-CM

## 2017-07-25 LAB
ANION GAP SERPL CALCULATED.3IONS-SCNC: 8 MMOL/L (ref 3–14)
BUN SERPL-MCNC: 15 MG/DL (ref 7–30)
CALCIUM SERPL-MCNC: 9.3 MG/DL (ref 8.5–10.1)
CHLORIDE SERPL-SCNC: 95 MMOL/L (ref 94–109)
CO2 SERPL-SCNC: 29 MMOL/L (ref 20–32)
CREAT SERPL-MCNC: 0.81 MG/DL (ref 0.66–1.25)
GFR SERPL CREATININE-BSD FRML MDRD: ABNORMAL ML/MIN/1.7M2
GLUCOSE SERPL-MCNC: 100 MG/DL (ref 70–99)
POTASSIUM SERPL-SCNC: 5 MMOL/L (ref 3.4–5.3)
SODIUM SERPL-SCNC: 132 MMOL/L (ref 133–144)

## 2017-07-25 PROCEDURE — 36415 COLL VENOUS BLD VENIPUNCTURE: CPT | Performed by: NURSE PRACTITIONER

## 2017-07-25 PROCEDURE — 80048 BASIC METABOLIC PNL TOTAL CA: CPT | Performed by: NURSE PRACTITIONER

## 2017-07-25 PROCEDURE — 99496 TRANSJ CARE MGMT HIGH F2F 7D: CPT | Performed by: NURSE PRACTITIONER

## 2017-07-25 NOTE — MR AVS SNAPSHOT
After Visit Summary   7/25/2017    Luis Yates    MRN: [id number]           Patient Information     Date Of Birth          1960        Visit Information        Provider Department      7/25/2017 9:30 AM Carola Grullon APRN CNP; [facility]        Today's Diagnoses     Low sodium levels    -  1      Care Instructions    Lab in [address]    May eat a little salty food in next week     No change in blood pressure medication               Follow-ups after your visit        Your next 10 appointments already scheduled     Aug 01, 2017  7:00 AM CDT   [facility] PA[facility]AL [facility] CNP   [facility]    [address] [place]  [facility] [id number] [phone]            Aug 02, 2017   Procedure with Darrick Moseley MD   [facility] ([facility])    [address] [facility] [address]   [phone]           [facility] is located at [address] [facility]. [place]              Who to contact     If you have questions or need follow up information about today's clinic visit or your schedule please contact [facility] directly at [phone].  Normal or non-critical lab and imaging results will be communicated to you by Peekhart, letter or phone within 4 business days after the clinic has received the results. If you do not hear from us within 7 days, please contact the clinic through Peekhart or phone. If you have a critical or abnormal lab result, we will notify you by phone as soon as possible.  Submit refill requests through [employer] Great[employer] or call your pharmacy and they will forward the refill request to us. Please allow 3 business days for your refill to be completed.          Additional Information About Your Visit        [employer] Information     [employer] lets you send messages to your doctor, view your test results, renew your prescriptions,  "schedule appointments and more. To sign up, go to [email or web address] . Click on \"Log in\" on the left side of the screen, which will take you to the Welcome page. Then click on \"Sign up Now\" on the right side of the page.     You will be asked to enter the access code listed below, as well as some personal information. Please follow the directions to create your username and password.     Your access code is: [email or web address]  Expires: 10/23/2017 10:22 AM     Your access code will  in 90 days. If you need help or a new code, please call your [place] clinic or [phone].        Care EveryWhere ID     This is your Care EveryWhere ID. This could be used by other organizations to access your [place] medical records  [id number]        Your Vitals Were     Pulse Temperature Respirations Height Pulse Oximetry BMI (Body Mass Index)    57 98.4  F (36.9  C) (Oral) 12 5' 2\" (1.575 m) 97% 27.25 kg/m2       Blood Pressure from Last 3 Encounters:   17 136/68   17 135/74   17 170/72    Weight from Last 3 Encounters:   17 149 lb (67.6 kg)   17 149 lb 4.8 oz (67.7 kg)   17 148 lb 12.8 oz (67.5 kg)              We Performed the Following     Basic metabolic panel        Primary Care Provider Office Phone # Fax #    PAYAL Baker [phone]       [facility] [address] [place] [address]        Equal Access to Services     LILIANA KENDALL : Hadii marlene Chavira, omero peter, madison cronin. So [facility] [phone].    ATENCIÓN: Si habla español, tiene a urban disposición servicios gratuitos de asistencia lingüística. Llame al [phone].    We comply with applicable federal civil rights laws and [place] laws. We do not discriminate on the basis of race, color, national origin, age, disability sex, sexual orientation or gender identity.            Thank you!     Thank you for " choosing [facility]  for your care. Our goal is always to provide you with excellent care. Hearing back from our patients is one way we can continue to improve our services. Please take a few minutes to complete the written survey that you may receive in the mail after your visit with us. Thank you!             Your Updated Medication List - Protect others around you: Learn how to safely use, store and throw away your medicines at www.disposemymeds.org.          This list is accurate as of: 7/25/17 10:22 AM.  Always use your most recent med list.                   Brand Name Dispense Instructions for use Diagnosis    atenolol 50 MG tablet    TENORMIN    180 tablet    Take 2 tablets (100 mg) by mouth daily    Essential hypertension       losartan 50 MG tablet    COZAAR    30 tablet    Take 2 tablets (100 mg) by mouth daily    HTN (hypertension), benign       omeprazole 20 MG CR capsule    priLOSEC    60 capsule    Take 1 capsule (20 mg) by mouth 2 times daily    Gastroesophageal reflux disease without esophagitis       ondansetron 4 MG ODT tab    ZOFRAN-ODT    20 tablet    Take 1 tablet (4 mg) by mouth every 6 hours as needed for nausea or vomiting    Abdominal pain, epigastric       sucralfate 1 GM/10ML suspension    CARAFATE    1200 mL    Take 10 mLs (1 g) by mouth 4 times daily as needed    Abdominal pain, epigastric

## 2017-07-25 NOTE — PROGRESS NOTES
SUBJECTIVE:                                                    Luis Yates is a 57 year old male who presents to clinic today for the following health issues:          Hospital Follow-up Visit:    Hospital/Nursing Home/IP Rehab Facility: [facility]  Date of Admission: 7/21/17  Date of Discharge: 7/22/17  Reason(s) for Admission: Abdominal pain, Duodenitis            Problems taking medications regularly:  None       Medication changes since discharge: None       Problems adhering to non-medication therapy:  None    Summary of hospitalization:  [facility] discharge summary reviewed  Diagnostic Tests/Treatments reviewed.  Follow up needed: gastroenterology biopsy pending   Other Healthcare Providers Involved in Patient s Care:           Update since discharge: improved.     Post Discharge Medication Reconciliation: discharge medications reconciled and changed, per note/orders (see AVS).  Plan of care communicated with patient     Coding guidelines for this visit:  Type of Medical   Decision Making Face-to-Face Visit       within 7 Days of discharge Face-to-Face Visit        within 14 days of discharge   Moderate Complexity [id number]   High Complexity [id number]       Using Carafate and stomach is better    Nausea absent   Used zofran only x 2     Weakness and dizziness especially on left side - normal CT head                    Problem list and histories reviewed & adjusted, as indicated.  Additional history: as documented    Patient Active Problem List   Diagnosis     HTN (hypertension), benign     Gout     Hypercholesteremia     Hyponatremia     Asthma, mild intermittent     Past Surgical History:   Procedure Laterality Date     ESOPHAGOSCOPY, GASTROSCOPY, DUODENOSCOPY (EGD), COMBINED N/A 7/22/2017    Procedure: COMBINED ESOPHAGOSCOPY, GASTROSCOPY, DUODENOSCOPY (EGD), BIOPSY SINGLE OR MULTIPLE;  COMBINED ESOPHAGOSCOPY, GASTROSCOPY, DUODENOSCOPY with biopsies ;  Surgeon: Pradip  "Darrick SANTIAGO MD;  Location: [facility]     HERNIA REPAIR  2010    left      HERNIA REPAIR  2010     LAPAROSCOPIC HERNIORRHAPHY INGUINAL BILATERAL Bilateral 6/1/2017    Procedure: LAPAROSCOPIC HERNIORRHAPHY INGUINAL BILATERAL;  LAPAROSCOPIC HERNIORRHAPHY INGUINAL BILATERAL with mesh;  Surgeon: Pola Barbosa MD;  Location: [facility] OR       Social History   Substance Use Topics     Smoking status: Never Smoker     Smokeless tobacco: Never Used     Alcohol use No     Family History   Problem Relation Age of Onset     Unknown/Adopted Mother      Unknown/Adopted Father              Reviewed and updated as needed this visit by clinical staffTobacco  Allergies  Meds  Soc Hx      Reviewed and updated as needed this visit by Provider         ROS:  Constitutional, HEENT, cardiovascular, pulmonary, GI, , musculoskeletal, neuro, skin, endocrine and psych systems are negative, except as otherwise noted.      OBJECTIVE:   /68 (BP Location: Left arm, Patient Position: Chair, Cuff Size: Adult Regular)  Pulse 57  Temp 98.4  F (36.9  C) (Oral)  Resp 12  Ht 5' 2\" (1.575 m)  Wt 149 lb (67.6 kg)  SpO2 97%  BMI 27.25 kg/m2  Body mass index is 27.25 kg/(m^2).  GENERAL: alert and no distress; here with daughter and    RESP: lungs clear to auscultation - no rales, rhonchi or wheezes  CV: regular rate and rhythm, normal S1 S2, no S3 or S4, no murmur, click or rub, no peripheral edema   ABDOMEN: soft, nontender, and bowel sounds normal  MS: no gross musculoskeletal defects noted, no edema  NEURO: Normal strength and tone, mentation intact and speech normal  PSYCH: mentation appears normal, affect normal/bright    Diagnostic Test Results:  Lab today   Reviewed notes and results     ASSESSMENT/PLAN:     (E87.1) Low sodium levels  (primary encounter diagnosis)  Comment: thought to be because he was drinking too much fluids   Plan: Basic metabolic panel            (I10) HTN (hypertension), benign  Comment: in good range on " second check   Plan: no change in blood pressure medication         Patient Instructions   Lab in [address]    May eat a little salty food in next week     No change in blood pressure medication           PAYAL Baker [facility]

## 2017-07-25 NOTE — PATIENT INSTRUCTIONS
Lab in [address]    May eat a little salty food in next week     No change in blood pressure medication

## 2017-07-25 NOTE — NURSING NOTE
"Chief Complaint   Patient presents with     Hospital F/U       Initial /60 (BP Location: Left arm, Patient Position: Chair, Cuff Size: Adult Regular)  Pulse 57  Temp 98.4  F (36.9  C) (Oral)  Resp 12  Ht 5' 2\" (1.575 m)  Wt 149 lb (67.6 kg)  SpO2 97%  BMI 27.25 kg/m2 Estimated body mass index is 27.25 kg/(m^2) as calculated from the following:    Height as of this encounter: 5' 2\" (1.575 m).    Weight as of this encounter: 149 lb (67.6 kg).  Medication Reconciliation: complete     ANIKA Agudelo      "

## 2017-07-26 DIAGNOSIS — E87.1 LOW SODIUM LEVELS: Primary | ICD-10-CM

## 2017-07-26 LAB — COPATH REPORT: NORMAL

## 2017-08-08 ENCOUNTER — OFFICE VISIT (OUTPATIENT)
Dept: INTERNAL MEDICINE | Facility: CLINIC | Age: 57
End: 2017-08-08
Payer: COMMERCIAL

## 2017-08-08 VITALS
TEMPERATURE: 98.5 F | HEIGHT: 63 IN | SYSTOLIC BLOOD PRESSURE: 192 MMHG | HEART RATE: 69 BPM | WEIGHT: 149 LBS | OXYGEN SATURATION: 100 % | DIASTOLIC BLOOD PRESSURE: 86 MMHG | BODY MASS INDEX: 26.4 KG/M2

## 2017-08-08 DIAGNOSIS — I10 HTN (HYPERTENSION), BENIGN: ICD-10-CM

## 2017-08-08 DIAGNOSIS — K21.9 GASTROESOPHAGEAL REFLUX DISEASE WITHOUT ESOPHAGITIS: ICD-10-CM

## 2017-08-08 DIAGNOSIS — R30.0 DYSURIA: Primary | ICD-10-CM

## 2017-08-08 DIAGNOSIS — E87.1 LOW SODIUM LEVELS: ICD-10-CM

## 2017-08-08 LAB
ALBUMIN UR-MCNC: NEGATIVE MG/DL
APPEARANCE UR: CLEAR
BILIRUB UR QL STRIP: NEGATIVE
COLOR UR AUTO: YELLOW
GLUCOSE UR STRIP-MCNC: NEGATIVE MG/DL
HGB UR QL STRIP: NEGATIVE
KETONES UR STRIP-MCNC: NEGATIVE MG/DL
LEUKOCYTE ESTERASE UR QL STRIP: NEGATIVE
NITRATE UR QL: NEGATIVE
PH UR STRIP: 5.5 PH (ref 5–7)
SP GR UR STRIP: 1.01 (ref 1–1.03)
URN SPEC COLLECT METH UR: NORMAL
UROBILINOGEN UR STRIP-ACNC: 0.2 EU/DL (ref 0.2–1)

## 2017-08-08 PROCEDURE — 36415 COLL VENOUS BLD VENIPUNCTURE: CPT | Performed by: NURSE PRACTITIONER

## 2017-08-08 PROCEDURE — 81003 URINALYSIS AUTO W/O SCOPE: CPT | Performed by: NURSE PRACTITIONER

## 2017-08-08 PROCEDURE — 99214 OFFICE O/P EST MOD 30 MIN: CPT | Performed by: NURSE PRACTITIONER

## 2017-08-08 PROCEDURE — 80048 BASIC METABOLIC PNL TOTAL CA: CPT | Performed by: NURSE PRACTITIONER

## 2017-08-08 RX ORDER — LOSARTAN POTASSIUM 100 MG/1
100 TABLET ORAL DAILY
Qty: 90 TABLET | Refills: 3 | Status: SHIPPED | OUTPATIENT
Start: 2017-08-08 | End: 2018-09-05

## 2017-08-08 RX ORDER — AMLODIPINE BESYLATE 5 MG/1
5 TABLET ORAL DAILY
Qty: 90 TABLET | Refills: 1 | Status: SHIPPED | OUTPATIENT
Start: 2017-08-08 | End: 2022-02-22

## 2017-08-08 RX ORDER — SUCRALFATE 1 G/1
1 TABLET ORAL 4 TIMES DAILY
Qty: 360 TABLET | Refills: 1 | Status: SHIPPED | OUTPATIENT
Start: 2017-08-08 | End: 2022-02-22

## 2017-08-08 RX ORDER — ATENOLOL 100 MG/1
100 TABLET ORAL DAILY
Qty: 90 TABLET | Refills: 1 | Status: SHIPPED | OUTPATIENT
Start: 2017-08-08 | End: 2018-09-05

## 2017-08-08 NOTE — PATIENT INSTRUCTIONS
LAB IN [address]    WE WILL CALL YOU WITH URINE TEST     CALL SURGEON REGARDING PAIN IN SURGICAL AREA FOR RECHECK     ADD AMLODIPINE 5 MG DAILY FOR BP     RECHECK BP NEXT WEEK     WILL CHANGE TO THE PILL FORM OF CARAFATE

## 2017-08-08 NOTE — MR AVS SNAPSHOT
"              After Visit Summary   8/8/2017    Luis Yates    MRN: [id number]           Patient Information     Date Of Birth          1960        Visit Information        Provider Department      8/8/2017 10:30 AM Carola Grullon APRN CNP; LANGUAGE BANC [facility]        Today's Diagnoses     Dysuria    -  1    Low sodium levels        HTN (hypertension), benign        Gastroesophageal reflux disease without esophagitis          Care Instructions    LAB IN [address]    WE WILL CALL YOU WITH URINE TEST     CALL SURGEON REGARDING PAIN IN SURGICAL AREA FOR RECHECK     ADD AMLODIPINE 5 MG DAILY FOR BP     RECHECK BP NEXT WEEK     WILL CHANGE TO THE PILL FORM OF CARAFATE           Follow-ups after your visit        Who to contact     If you have questions or need follow up information about today's clinic visit or your schedule please contact [facility] directly at [phone].  Normal or non-critical lab and imaging results will be communicated to you by [employer], letter or phone within 4 business days after the clinic has received the results. If you do not hear from us within 7 days, please contact the clinic through [employer] or phone. If you have a critical or abnormal lab result, we will notify you by phone as soon as possible.  Submit refill requests through [employer] or call your pharmacy and they will forward the refill request to us. Please allow 3 business days for your refill to be completed.          Additional Information About Your Visit        [employer] Information     [employer] lets you send messages to your doctor, view your test results, renew your prescriptions, schedule appointments and more. To sign up, go to [email or web address] . Click on \"Log in\" on the left side of the screen, which will take you to the Welcome page. Then click on \"Sign up Now\" on the right side of the page.     You will be asked to enter the access code listed below, as well as " "some personal information. Please follow the directions to create your username and password.     Your access code is: [email or web address]  Expires: 10/23/2017 10:22 AM     Your access code will  in 90 days. If you need help or a new code, please call your [place] clinic or [phone].        Care EveryWhere ID     This is your Care EveryWhere ID. This could be used by other organizations to access your [place] medical records  [id number]        Your Vitals Were     Pulse Temperature Height Pulse Oximetry BMI (Body Mass Index)       69 98.5  F (36.9  C) (Oral) 5' 3\" (1.6 m) 100% 26.39 kg/m2        Blood Pressure from Last 3 Encounters:   17 192/86   17 136/68   17 135/74    Weight from Last 3 Encounters:   17 149 lb (67.6 kg)   17 149 lb (67.6 kg)   17 149 lb 4.8 oz (67.7 kg)              We Performed the Following     *UA reflex to Microscopic and Culture ([place] and [facility] (except [place] and [place])     Basic metabolic panel          Today's Medication Changes          These changes are accurate as of: 17 11:47 AM.  If you have any questions, ask your nurse or doctor.               Start taking these medicines.        Dose/Directions    amLODIPine 5 MG tablet   Commonly known as:  NORVASC   Used for:  HTN (hypertension), benign   Started by:  Carola Grullon APRN CNP        Dose:  5 mg   Take 1 tablet (5 mg) by mouth daily   Quantity:  90 tablet   Refills:  1         These medicines have changed or have updated prescriptions.        Dose/Directions    * atenolol 50 MG tablet   Commonly known as:  TENORMIN   This may have changed:  Another medication with the same name was added. Make sure you understand how and when to take each.   Used for:  Essential hypertension   Changed by:  Carola Grullon APRN CNP        Dose:  100 mg   Take 2 tablets (100 mg) by mouth daily   Quantity:  180 tablet   Refills:  1       * atenolol 100 MG tablet   Commonly " known as:  TENORMIN   This may have changed:  You were already taking a medication with the same name, and this prescription was added. Make sure you understand how and when to take each.   Used for:  HTN (hypertension), benign   Changed by:  Carola Grullon APRN CNP        Dose:  100 mg   Take 1 tablet (100 mg) by mouth daily   Quantity:  90 tablet   Refills:  1       * losartan 50 MG tablet   Commonly known as:  COZAAR   This may have changed:  Another medication with the same name was added. Make sure you understand how and when to take each.   Used for:  HTN (hypertension), benign        Dose:  100 mg   Take 2 tablets (100 mg) by mouth daily   Quantity:  30 tablet   Refills:  1       * losartan 100 MG tablet   Commonly known as:  COZAAR   This may have changed:  You were already taking a medication with the same name, and this prescription was added. Make sure you understand how and when to take each.   Used for:  HTN (hypertension), benign   Changed by:  Carola Grullon APRN CNP        Dose:  100 mg   Take 1 tablet (100 mg) by mouth daily   Quantity:  90 tablet   Refills:  3       * sucralfate 1 GM/10ML suspension   Commonly known as:  CARAFATE   This may have changed:  Another medication with the same name was added. Make sure you understand how and when to take each.   Used for:  Abdominal pain, epigastric        Dose:  1 g   Take 10 mLs (1 g) by mouth 4 times daily as needed   Quantity:  1200 mL   Refills:  0       * sucralfate 1 GM tablet   Commonly known as:  CARAFATE   This may have changed:  You were already taking a medication with the same name, and this prescription was added. Make sure you understand how and when to take each.   Used for:  Gastroesophageal reflux disease without esophagitis   Changed by:  Carola Grullon APRN CNP        Dose:  1 g   Take 1 tablet (1 g) by mouth 4 times daily   Quantity:  360 tablet   Refills:  1       * Notice:  This list has 6 medication(s) that are  the same as other medications prescribed for you. Read the directions carefully, and ask your doctor or other care provider to review them with you.         Where to get your medicines      These medications were sent to [id number] - [place], [place] - [address]  [address], [facility] [address]     Phone:  [phone]     amLODIPine 5 MG tablet    atenolol 100 MG tablet    losartan 100 MG tablet    omeprazole 20 MG CR capsule    sucralfate 1 GM tablet                Primary Care Provider Office Phone # Fax #    Carola Manzano Mitchel, APRN [phone]       [facility] [address] [facility] [address]        Equal Access to Services     LILIANA KENDALL : Hadii marlene mckeon hadasho Sopatali, waaxda luqadaha, qaybta kaalmada adeegyada, madison mojica . So [facility] [phone].    ATENCIÓN: Si habla español, tiene a urban disposición servicios gratuitos de asistencia lingüística. Llame al [phone].    We comply with applicable federal civil rights laws and [place] laws. We do not discriminate on the basis of race, color, national origin, age, disability sex, sexual orientation or gender identity.            Thank you!     Thank you for choosing [facility]  for your care. Our goal is always to provide you with excellent care. Hearing back from our patients is one way we can continue to improve our services. Please take a few minutes to complete the written survey that you may receive in the mail after your visit with us. Thank you!             Your Updated Medication List - Protect others around you: Learn how to safely use, store and throw away your medicines at www.disposemymeds.org.          This list is accurate as of: 8/8/17 11:47 AM.  Always use your most recent med list.                   Brand Name Dispense Instructions for use Diagnosis    amLODIPine 5 MG tablet    NORVASC    90 tablet    Take 1 tablet (5 mg) by  mouth daily    HTN (hypertension), benign       * atenolol 50 MG tablet    TENORMIN    180 tablet    Take 2 tablets (100 mg) by mouth daily    Essential hypertension       * atenolol 100 MG tablet    TENORMIN    90 tablet    Take 1 tablet (100 mg) by mouth daily    HTN (hypertension), benign       * losartan 50 MG tablet    COZAAR    30 tablet    Take 2 tablets (100 mg) by mouth daily    HTN (hypertension), benign       * losartan 100 MG tablet    COZAAR    90 tablet    Take 1 tablet (100 mg) by mouth daily    HTN (hypertension), benign       omeprazole 20 MG CR capsule    priLOSEC    180 capsule    Take 1 capsule (20 mg) by mouth 2 times daily    Gastroesophageal reflux disease without esophagitis       ondansetron 4 MG ODT tab    ZOFRAN-ODT    20 tablet    Take 1 tablet (4 mg) by mouth every 6 hours as needed for nausea or vomiting    Abdominal pain, epigastric       * sucralfate 1 GM/10ML suspension    CARAFATE    1200 mL    Take 10 mLs (1 g) by mouth 4 times daily as needed    Abdominal pain, epigastric       * sucralfate 1 GM tablet    CARAFATE    360 tablet    Take 1 tablet (1 g) by mouth 4 times daily    Gastroesophageal reflux disease without esophagitis       * Notice:  This list has 6 medication(s) that are the same as other medications prescribed for you. Read the directions carefully, and ask your doctor or other care provider to review them with you.

## 2017-08-08 NOTE — PROGRESS NOTES
SUBJECTIVE:                                                    Luis Yates is a 57 year old male who presents to clinic today for the following health issues:      Hyperlipidemia Follow-Up      Rate your low fat/cholesterol diet?: good    Taking statin?  Yes, no muscle aches from statin    Other lipid medications/supplements?:  none    Hypertension Follow-up      Outpatient blood pressures are being checked at home.  Results are 125/80.    Low Salt Diet: no added salt    135/ IS LOWEST  IS HIGHEST BP    High today - needs additional medication   Will recheck charla next week before he leaves country          Amount of exercise or physical activity: 6-7 days/week for an average of 30-45 minutes    Problems taking medications regularly: No    Medication side effects: none  Diet: low salt          Problem list and histories reviewed & adjusted, as indicated.  Additional history: as documented    Patient Active Problem List   Diagnosis     HTN (hypertension), benign     Gout     Hypercholesteremia     Hyponatremia     Asthma, mild intermittent     Past Surgical History:   Procedure Laterality Date     ESOPHAGOSCOPY, GASTROSCOPY, DUODENOSCOPY (EGD), COMBINED N/A 7/22/2017    Procedure: COMBINED ESOPHAGOSCOPY, GASTROSCOPY, DUODENOSCOPY (EGD), BIOPSY SINGLE OR MULTIPLE;  COMBINED ESOPHAGOSCOPY, GASTROSCOPY, DUODENOSCOPY with biopsies ;  Surgeon: Darrick Moseley MD;  Location:  GI     HERNIA REPAIR  2010    left      HERNIA REPAIR  2010     LAPAROSCOPIC HERNIORRHAPHY INGUINAL BILATERAL Bilateral 6/1/2017    Procedure: LAPAROSCOPIC HERNIORRHAPHY INGUINAL BILATERAL;  LAPAROSCOPIC HERNIORRHAPHY INGUINAL BILATERAL with mesh;  Surgeon: Pola Barbosa MD;  Location:  OR       Social History   Substance Use Topics     Smoking status: Never Smoker     Smokeless tobacco: Never Used     Alcohol use No     Family History   Problem Relation Age of Onset     Family History Negative Mother      Unknown/Adopted  "Father              ROS:  Constitutional, HEENT, cardiovascular, pulmonary, GI, , musculoskeletal, neuro, skin, endocrine and psych systems are negative, except as otherwise noted.      OBJECTIVE:   /86  Pulse 69  Temp 98.5  F (36.9  C) (Oral)  Ht 5' 3\" (1.6 m)  Wt 149 lb (67.6 kg)  SpO2 100%  BMI 26.39 kg/m2  Body mass index is 26.39 kg/(m^2).   Recheck 158/92  GENERAL: alert and no distress; here with  and daughter   RESP: lungs clear to auscultation - no rales, rhonchi or wheezes  CV: regular rate and rhythm, normal S1 S2, no S3 or S4, no murmur, click or rub, no peripheral edema   ABDOMEN: soft, slight discomfort in right inguinal area,  and bowel sounds normal  MS: no gross musculoskeletal defects noted, no edema  NEURO: Normal strength and tone, mentation intact and speech normal  PSYCH: mentation appears normal, affect normal/bright    Diagnostic Test Results:  Labs     ASSESSMENT/PLAN:             1. Dysuria    - *UA reflex to Microscopic and Culture ([place] [facility] (except [place] and [place])    2. Low sodium levels  Needs recheck   - Basic metabolic panel    3. HTN (hypertension), benign  Not in control- add amlodipine   - losartan (COZAAR) 100 MG tablet; Take 1 tablet (100 mg) by mouth daily  Dispense: 90 tablet; Refill: 3  - atenolol (TENORMIN) 100 MG tablet; Take 1 tablet (100 mg) by mouth daily  Dispense: 90 tablet; Refill: 1  - amLODIPine (NORVASC) 5 MG tablet; Take 1 tablet (5 mg) by mouth daily  Dispense: 90 tablet; Refill: 1    4. Gastroesophageal reflux disease without esophagitis  [place]- change Carafate to pill form    - omeprazole (PRILOSEC) 20 MG CR capsule; Take 1 capsule (20 mg) by mouth 2 times daily  Dispense: 180 capsule; Refill: 1  - sucralfate (CARAFATE) 1 GM tablet; Take 1 tablet (1 g) by mouth 4 times daily  Dispense: 360 tablet; Refill: 1    Patient Instructions   LAB IN [address]    WE WILL CALL YOU WITH URINE TEST     CALL " SURGEON REGARDING PAIN IN SURGICAL AREA FOR RECHECK     ADD AMLODIPINE 5 MG DAILY FOR BP     RECHECK BP NEXT WEEK     WILL CHANGE TO THE PILL FORM OF CARAFATE       PAYAL Baker CNP  [facility]

## 2017-08-09 ENCOUNTER — OFFICE VISIT (OUTPATIENT)
Dept: SURGERY | Facility: CLINIC | Age: 57
End: 2017-08-09
Payer: COMMERCIAL

## 2017-08-09 VITALS — WEIGHT: 145 LBS | BODY MASS INDEX: 25.69 KG/M2 | HEIGHT: 63 IN

## 2017-08-09 DIAGNOSIS — Z09 SURGICAL FOLLOWUP VISIT: Primary | ICD-10-CM

## 2017-08-09 LAB
ANION GAP SERPL CALCULATED.3IONS-SCNC: 11 MMOL/L (ref 3–14)
BUN SERPL-MCNC: 9 MG/DL (ref 7–30)
CALCIUM SERPL-MCNC: 9.4 MG/DL (ref 8.5–10.1)
CHLORIDE SERPL-SCNC: 104 MMOL/L (ref 94–109)
CO2 SERPL-SCNC: 26 MMOL/L (ref 20–32)
CREAT SERPL-MCNC: 0.71 MG/DL (ref 0.66–1.25)
GFR SERPL CREATININE-BSD FRML MDRD: ABNORMAL ML/MIN/1.7M2
GLUCOSE SERPL-MCNC: 102 MG/DL (ref 70–99)
POTASSIUM SERPL-SCNC: 4.1 MMOL/L (ref 3.4–5.3)
SODIUM SERPL-SCNC: 141 MMOL/L (ref 133–144)

## 2017-08-09 PROCEDURE — 99024 POSTOP FOLLOW-UP VISIT: CPT | Performed by: PHYSICIAN ASSISTANT

## 2017-08-09 NOTE — PROGRESS NOTES
[facility] Note   8/9/2017    Subjective:  *Seen with family and   Luis Yates is here for his second postoperative visit.  He underwent Laparoscopic repair of recurrent Bilateral Inguinal hernias (both were direct defects) with ProGrip Mesh by Dr. Gray.  He is now 2 months postop, and his primary complaint is genital pain and urinary incontinence for the past three days. He was seen yesterday at his primary care clinic during which time a UA was ordered, which appears normal. He also reports associated bilateral flank pain which occasionally radiates to his thoracic back. He denies groin pain or pain related to his hernia repairs. Of note, patient reports he has seen a urologist in the past for prostate issues.     Objective:  Abd - soft, non-tender, non-distended  Right groin - no hernia, no seroma, nontender  Left groin - no hernia, no seroma, nontender  Laparoscopic incisions - well healed, no erythema/bruising, +normal healing ridge, no seroma/hematoma noted, no hernia noted    Genital exam: skin without rashes, openings, or obvious signs of infection.     Assessment:  -S/p Laparoscopic repair of recurrent Bilateral Inguinal hernias (both were direct defects) with ProGrip Mesh  -small tender seroma at left groin, his recurrent hernia was reduced from this location at the time of surgery    Plan:  Recommend follow up with primary care provider for urinary incontinance, follow up on UA  Patient and family are in agreement with this plan.     Radha Mckeon PA-C        Please route or send letter to:  Primary Care Provider (PCP)

## 2017-08-09 NOTE — MR AVS SNAPSHOT
"              After Visit Summary   8/9/2017    Luis Yates    MRN: [id number]           Patient Information     Date Of Birth          1960        Visit Information        Provider Department      8/9/2017 1:45 PM Radha Mckeon PA-C; LANGUAGE [facility]      Today's Diagnoses     Surgical followup visit    -  1       Follow-ups after your visit        Your next 10 appointments already scheduled     Aug 14, 2017  9:30 AM CDT   SHORT with Shoshana López MD   [facility] ([facility])    [address]  [facility] [address]   [phone]              Who to contact     If you have questions or need follow up information about today's clinic visit or your schedule please contact [facility] directly at [phone].  Normal or non-critical lab and imaging results will be communicated to you by MyChart, letter or phone within 4 business days after the clinic has received the results. If you do not hear from us within 7 days, please contact the clinic through MyChart or phone. If you have a critical or abnormal lab result, we will notify you by phone as soon as possible.  Submit refill requests through Joyride or call your pharmacy and they will forward the refill request to us. Please allow 3 business days for your refill to be completed.          Additional Information About Your Visit        MyChart Information     Joyride lets you send messages to your doctor, view your test results, renew your prescriptions, schedule appointments and more. To sign up, go to [email or web address] . Click on \"Log in\" on the left side of the screen, which will take you to the Welcome page. Then click on \"Sign up Now\" on the right side of the page.     You will be asked to enter the access code listed below, as well as some personal information. Please follow the " "directions to create your username and password.     Your access code is: [email or web address]  Expires: 10/23/2017 10:22 AM     Your access code will  in 90 days. If you need help or a new code, please call your [place] clinic or [phone].        Care EveryWhere ID     This is your Care EveryWhere ID. This could be used by other organizations to access your [place] medical records  [id number]        Your Vitals Were     Height BMI (Body Mass Index)                1.6 m (5' 3\") 25.69 kg/m2           Blood Pressure from Last 3 Encounters:   17 192/86   17 136/68   17 135/74    Weight from Last 3 Encounters:   17 65.8 kg (145 lb)   17 67.6 kg (149 lb)   17 67.6 kg (149 lb)              Today, you had the following     No orders found for display       Primary Care Provider Office Phone # Fax #    PAYAL Baker [facility] [phone]       [address] [facility] [address]        Equal Access to Services     [facility] Center[facility]WAR : Hadii aad ku hadasho Soomaali, waaxda luqadaha, qaybta kaalmada adeegyada, madison vickin haylorin nimco mojica . So [facility] [phone].    ATENCIÓN: Si habla español, tiene a urban disposición servicios gratuitos de asistencia lingüística. Llame al [phone].    We comply with applicable federal civil rights laws and [place] laws. We do not discriminate on the basis of race, color, national origin, age, disability sex, sexual orientation or gender identity.            Thank you!     Thank you for choosing [facility]  for your care. Our goal is always to provide you with excellent care. Hearing back from our patients is one way we can continue to improve our services. Please take a few minutes to complete the written survey that you may receive in the mail after your visit with us. Thank you!             Your Updated Medication List - Protect others around you: Learn how to safely use, store and throw away " your medicines at www.disposemymeds.org.          This list is accurate as of: 8/9/17  2:46 PM.  Always use your most recent med list.                   Brand Name Dispense Instructions for use Diagnosis    amLODIPine 5 MG tablet    NORVASC    90 tablet    Take 1 tablet (5 mg) by mouth daily    HTN (hypertension), benign       * atenolol 50 MG tablet    TENORMIN    180 tablet    Take 2 tablets (100 mg) by mouth daily    Essential hypertension       * atenolol 100 MG tablet    TENORMIN    90 tablet    Take 1 tablet (100 mg) by mouth daily    HTN (hypertension), benign       * losartan 50 MG tablet    COZAAR    30 tablet    Take 2 tablets (100 mg) by mouth daily    HTN (hypertension), benign       * losartan 100 MG tablet    COZAAR    90 tablet    Take 1 tablet (100 mg) by mouth daily    HTN (hypertension), benign       omeprazole 20 MG CR capsule    priLOSEC    180 capsule    Take 1 capsule (20 mg) by mouth 2 times daily    Gastroesophageal reflux disease without esophagitis       ondansetron 4 MG ODT tab    ZOFRAN-ODT    20 tablet    Take 1 tablet (4 mg) by mouth every 6 hours as needed for nausea or vomiting    Abdominal pain, epigastric       * sucralfate 1 GM/10ML suspension    CARAFATE    1200 mL    Take 10 mLs (1 g) by mouth 4 times daily as needed    Abdominal pain, epigastric       * sucralfate 1 GM tablet    CARAFATE    360 tablet    Take 1 tablet (1 g) by mouth 4 times daily    Gastroesophageal reflux disease without esophagitis       * Notice:  This list has 6 medication(s) that are the same as other medications prescribed for you. Read the directions carefully, and ask your doctor or other care provider to review them with you.

## 2017-08-10 ENCOUNTER — RADIANT APPOINTMENT (OUTPATIENT)
Dept: GENERAL RADIOLOGY | Facility: CLINIC | Age: 57
End: 2017-08-10
Attending: NURSE PRACTITIONER
Payer: COMMERCIAL

## 2017-08-10 ENCOUNTER — OFFICE VISIT (OUTPATIENT)
Dept: INTERNAL MEDICINE | Facility: CLINIC | Age: 57
End: 2017-08-10
Payer: COMMERCIAL

## 2017-08-10 ENCOUNTER — TELEPHONE (OUTPATIENT)
Dept: INTERNAL MEDICINE | Facility: CLINIC | Age: 57
End: 2017-08-10

## 2017-08-10 VITALS
OXYGEN SATURATION: 96 % | HEART RATE: 69 BPM | BODY MASS INDEX: 26.93 KG/M2 | TEMPERATURE: 98.3 F | WEIGHT: 152 LBS | SYSTOLIC BLOOD PRESSURE: 148 MMHG | HEIGHT: 63 IN | DIASTOLIC BLOOD PRESSURE: 72 MMHG

## 2017-08-10 DIAGNOSIS — M54.50 CHRONIC BILATERAL LOW BACK PAIN WITHOUT SCIATICA: Primary | ICD-10-CM

## 2017-08-10 DIAGNOSIS — R30.0 DYSURIA: Primary | ICD-10-CM

## 2017-08-10 DIAGNOSIS — G89.29 CHRONIC BILATERAL LOW BACK PAIN WITHOUT SCIATICA: Primary | ICD-10-CM

## 2017-08-10 DIAGNOSIS — E87.1 HYPONATREMIA: ICD-10-CM

## 2017-08-10 DIAGNOSIS — G89.29 CHRONIC BILATERAL LOW BACK PAIN WITHOUT SCIATICA: ICD-10-CM

## 2017-08-10 DIAGNOSIS — M54.50 CHRONIC BILATERAL LOW BACK PAIN WITHOUT SCIATICA: ICD-10-CM

## 2017-08-10 DIAGNOSIS — I10 HTN (HYPERTENSION), BENIGN: ICD-10-CM

## 2017-08-10 PROCEDURE — 72100 X-RAY EXAM L-S SPINE 2/3 VWS: CPT

## 2017-08-10 PROCEDURE — 99213 OFFICE O/P EST LOW 20 MIN: CPT | Performed by: NURSE PRACTITIONER

## 2017-08-10 RX ORDER — IBUPROFEN 400 MG/1
400 TABLET, FILM COATED ORAL EVERY 8 HOURS PRN
Qty: 200 TABLET | Refills: 3 | Status: SHIPPED | OUTPATIENT
Start: 2017-08-10 | End: 2022-02-22

## 2017-08-10 NOTE — MR AVS SNAPSHOT
After Visit Summary   8/10/2017    Luis Yates    MRN: [id number]           Patient Information     Date Of Birth          1960        Visit Information        Provider Department      8/10/2017 2:30 PM Carola Grullon APRN CNP; LANGUAGE [facility]        Today's Diagnoses     Chronic bilateral low back pain without sciatica    -  1      Care Instructions    X ray in [address]    Ibuprofen 400 mg up to 3 times a day for pain in back   Take with food     No change in blood pressure medication     Follow up after you are back from your trip               Follow-ups after your visit        Your next 10 appointments already scheduled     Aug 14, 2017  9:30 AM CDT   SHORT with Shoshana López MD   [facility]    [address]  [facility] [address] [phone]              Future tests that were ordered for you today     Open Future Orders        Priority Expected Expires Ordered    XR Lumbar Spine 2/3 Views Routine 8/10/2017 8/10/2018 8/10/2017            Who to contact     If you have questions or need follow up information about today's clinic visit or your schedule please contact [facility] directly at [phone].  Normal or non-critical lab and imaging results will be communicated to you by MyChart, letter or phone within 4 business days after the clinic has received the results. If you do not hear from us within 7 days, please contact the clinic through [place] [employer] or phone. If you have a critical or abnormal lab result, we will notify you by phone as soon as possible.  Submit refill requests through Make YES! Happen or call your pharmacy and they will forward the refill request to us. Please allow 3 business days for your refill to be completed.          Additional Information About Your Visit        Make YES! Happen Information     [employer]! Happen lets you send messages to your doctor, view your  "test results, renew your prescriptions, schedule appointments and more. To sign up, go to [email or web address] . Click on \"Log in\" on the left side of the screen, which will take you to the Welcome page. Then click on \"Sign up Now\" on the right side of the page.     You will be asked to enter the access code listed below, as well as some personal information. Please follow the directions to create your username and password.     Your access code is: [email or web address]  Expires: 10/23/2017 10:22 AM     Your access code will  in 90 days. If you need help or a new code, please call your [place] clinic or [phone].        Care EveryWhere ID     This is your Care EveryWhere ID. This could be used by other organizations to access your [place] medical records  [id number]        Your Vitals Were     Pulse Temperature Height Pulse Oximetry BMI (Body Mass Index)       69 98.3  F (36.8  C) (Oral) 5' 3\" (1.6 m) 96% 26.93 kg/m2        Blood Pressure from Last 3 Encounters:   08/10/17 148/72   17 192/86   17 136/68    Weight from Last 3 Encounters:   08/10/17 152 lb (68.9 kg)   17 145 lb (65.8 kg)   17 149 lb (67.6 kg)                 Today's Medication Changes          These changes are accurate as of: 8/10/17  3:21 PM.  If you have any questions, ask your nurse or doctor.               Start taking these medicines.        Dose/Directions    ibuprofen 400 MG tablet   Commonly known as:  ADVIL/MOTRIN   Used for:  Chronic bilateral low back pain without sciatica   Started by:  Carola Grullon APRN CNP        Dose:  400 mg   Take 1 tablet (400 mg) by mouth every 8 hours as needed for moderate pain   Quantity:  200 tablet   Refills:  3            Where to get your medicines      These medications were sent to [id number] [address] [place] [address] [address]  [address] [place] [address]. [address], [facility] [address]     Phone:  [phone]     ibuprofen 400 MG tablet                Primary Care " Provider Office Phone # Fax #    PAYAL Baker [facility] [phone]       [address] [facility] [address]        Equal Access to Services     LILIANA KENDALL : Hadii marlene ku hadpko Soomaali, waaxda luqadaha, qaybta kaalmada adeegyada, madison box laClairlynsey orr. So [facility] [phone].    ATENCIÓN: Si habla español, tiene a urban disposición servicios gratuitos de asistencia lingüística. Llame al [phone].    We comply with applicable federal civil rights laws and [place] laws. We do not discriminate on the basis of race, color, national origin, age, disability sex, sexual orientation or gender identity.            Thank you!     Thank you for choosing [facility]  for your care. Our goal is always to provide you with excellent care. Hearing back from our patients is one way we can continue to improve our services. Please take a few minutes to complete the written survey that you may receive in the mail after your visit with us. Thank you!             Your Updated Medication List - Protect others around you: Learn how to safely use, store and throw away your medicines at www.disposemymeds.org.          This list is accurate as of: 8/10/17  3:21 PM.  Always use your most recent med list.                   Brand Name Dispense Instructions for use Diagnosis    amLODIPine 5 MG tablet    NORVASC    90 tablet    Take 1 tablet (5 mg) by mouth daily    HTN (hypertension), benign       atenolol 100 MG tablet    TENORMIN    90 tablet    Take 1 tablet (100 mg) by mouth daily    HTN (hypertension), benign       ibuprofen 400 MG tablet    ADVIL/MOTRIN    200 tablet    Take 1 tablet (400 mg) by mouth every 8 hours as needed for moderate pain    Chronic bilateral low back pain without sciatica       losartan 100 MG tablet    COZAAR    90 tablet    Take 1 tablet (100 mg) by mouth daily    HTN (hypertension), benign       omeprazole 20 MG CR capsule    priLOSEC    180 capsule     Take 1 capsule (20 mg) by mouth 2 times daily    Gastroesophageal reflux disease without esophagitis       ondansetron 4 MG ODT tab    ZOFRAN-ODT    20 tablet    Take 1 tablet (4 mg) by mouth every 6 hours as needed for nausea or vomiting    Abdominal pain, epigastric       sucralfate 1 GM tablet    CARAFATE    360 tablet    Take 1 tablet (1 g) by mouth 4 times daily    Gastroesophageal reflux disease without esophagitis

## 2017-08-10 NOTE — TELEPHONE ENCOUNTER
Patient is scheduled to see Carola this afternoon for urinary issues.  Per Carola, last 2 urine tests were negative.  If still having symptoms he may want to consider seeing a urologist.  If patient/daughter agree, sign referral and give contact information for [facility] and cancel appt with Carola.    Left message for daughter Kay to return call.  GERA Orr R.N.

## 2017-08-10 NOTE — NURSING NOTE
"Chief Complaint   Patient presents with     Pain     pt c/o lower back for 3 years but has gotten worse       Initial /72 (BP Location: Left arm, Patient Position: Sitting, Cuff Size: Adult Large)  Pulse 69  Temp 98.3  F (36.8  C) (Oral)  Ht 5' 3\" (1.6 m)  Wt 152 lb (68.9 kg)  SpO2 96%  BMI 26.93 kg/m2 Estimated body mass index is 26.93 kg/(m^2) as calculated from the following:    Height as of this encounter: 5' 3\" (1.6 m).    Weight as of this encounter: 152 lb (68.9 kg).  Medication Reconciliation: complete    "

## 2017-08-10 NOTE — TELEPHONE ENCOUNTER
Spoke with daughter, gave [facility] contact information.  She states pt would still like to see PCP today as he is having back pain.  GERA Orr R.N.

## 2017-08-10 NOTE — PROGRESS NOTES
.  SUBJECTIVE:                                                    Luis Yates is a 57 year old male who presents to clinic today for the following health issues:    Pain pt c/o lower back pain x 3 years but has gotten worse.  Low back pain in lumbar sacral area - not radiation to leg   This is not new - has been there in past   Was told at one point that he has a disc problem  Did therapy in [place]     Uses heat when needed     Going to see urology for urinary issues   Has had 2 normal urine tests and also saw surgery and they did not feel surgery was causing the issue          Problem list and histories reviewed & adjusted, as indicated.  Additional history:     Patient Active Problem List   Diagnosis     HTN (hypertension), benign     Gout     Hypercholesteremia     Hyponatremia     Asthma, mild intermittent     Past Surgical History:   Procedure Laterality Date     ESOPHAGOSCOPY, GASTROSCOPY, DUODENOSCOPY (EGD), COMBINED N/A 7/22/2017    Procedure: COMBINED ESOPHAGOSCOPY, GASTROSCOPY, DUODENOSCOPY (EGD), BIOPSY SINGLE OR MULTIPLE;  COMBINED ESOPHAGOSCOPY, GASTROSCOPY, DUODENOSCOPY with biopsies ;  Surgeon: Darrick Moseley MD;  Location:  GI     HERNIA REPAIR  2010    left      HERNIA REPAIR  2010     LAPAROSCOPIC HERNIORRHAPHY INGUINAL BILATERAL Bilateral 6/1/2017    Procedure: LAPAROSCOPIC HERNIORRHAPHY INGUINAL BILATERAL;  LAPAROSCOPIC HERNIORRHAPHY INGUINAL BILATERAL with mesh;  Surgeon: Pola Barbosa MD;  Location:  OR       Social History   Substance Use Topics     Smoking status: Never Smoker     Smokeless tobacco: Never Used     Alcohol use No     Family History   Problem Relation Age of Onset     Family History Negative Mother      Unknown/Adopted Father              Reviewed and updated as needed this visit by clinical staffTobacco  Allergies  Meds  Med Hx  Surg Hx  Fam Hx  Soc Hx      Reviewed and updated as needed this visit by Provider         ROS:  Constitutional,  "HEENT, cardiovascular, pulmonary, GI, , musculoskeletal, neuro, skin, endocrine and psych systems are negative, except as otherwise noted.      OBJECTIVE:   /72 (BP Location: Left arm, Patient Position: Sitting, Cuff Size: Adult Large)  Pulse 69  Temp 98.3  F (36.8  C) (Oral)  Ht 5' 3\" (1.6 m)  Wt 152 lb (68.9 kg)  SpO2 96%  BMI 26.93 kg/m2  Body mass index is 26.93 kg/(m^2).  GENERAL: healthy, alert and no distress  RESP: lungs clear to auscultation - no rales, rhonchi or wheezes  CV: regular rate and rhythm, normal S1 S2, no S3 or S4, no murmur, click or rub, no peripheral edema and peripheral pulses strong  MS: no gross musculoskeletal defects noted, no edema  NEURO: Normal strength and tone, mentation intact and speech normal  PSYCH: mentation appears normal, affect normal/bright    Diagnostic Test Results:  X ray   No acute findings     ASSESSMENT/PLAN:       (M54.5,  G89.29) Chronic bilateral low back pain without sciatica  (primary encounter diagnosis)  Comment: nothing acute on x ray - not really changed for years   Plan: XR Lumbar Spine 2/3 Views, ibuprofen         (ADVIL/MOTRIN) 400 MG tablet            (I10) HTN (hypertension), benign  Comment: blood pressure good range at home and almost normal range now here   Plan: will keep medication as they are   He is traveling next [place] to home country and I am satisfied with numbers he has today     (E87.1) Hyponatremia  Comment: resolved   Plan: resolved         Patient Instructions   X ray in [address]    Ibuprofen 400 mg up to 3 times a day for pain in back   Take with food     No change in blood pressure medication     Follow up after you are back from your trip           PAYAL Baker [facility]    "

## 2017-08-10 NOTE — PATIENT INSTRUCTIONS
X ray in [address]    Ibuprofen 400 mg up to 3 times a day for pain in back   Take with food     No change in blood pressure medication     Follow up after you are back from your trip

## 2017-08-13 ENCOUNTER — HOSPITAL ENCOUNTER (EMERGENCY)
Facility: CLINIC | Age: 57
Discharge: HOME | End: 2017-08-13
Attending: EMERGENCY MEDICINE | Admitting: EMERGENCY MEDICINE
Payer: COMMERCIAL

## 2017-08-13 ENCOUNTER — APPOINTMENT (OUTPATIENT)
Dept: CT IMAGING | Facility: CLINIC | Age: 57
End: 2017-08-13
Attending: EMERGENCY MEDICINE
Payer: COMMERCIAL

## 2017-08-13 VITALS — SYSTOLIC BLOOD PRESSURE: 164 MMHG | DIASTOLIC BLOOD PRESSURE: 88 MMHG | TEMPERATURE: 98.4 F | OXYGEN SATURATION: 95 %

## 2017-08-13 DIAGNOSIS — R10.32 ABDOMINAL PAIN, LEFT LOWER QUADRANT: ICD-10-CM

## 2017-08-13 DIAGNOSIS — K40.90 LEFT INGUINAL HERNIA: ICD-10-CM

## 2017-08-13 DIAGNOSIS — R10.31 RLQ ABDOMINAL PAIN: ICD-10-CM

## 2017-08-13 LAB
ALBUMIN SERPL-MCNC: 3.9 G/DL (ref 3.4–5)
ALBUMIN UR-MCNC: NEGATIVE MG/DL
ALP SERPL-CCNC: 55 U/L (ref 40–150)
ALT SERPL W P-5'-P-CCNC: 17 U/L (ref 0–70)
ANION GAP SERPL CALCULATED.3IONS-SCNC: 3 MMOL/L (ref 3–14)
APPEARANCE UR: CLEAR
AST SERPL W P-5'-P-CCNC: 14 U/L (ref 0–45)
BASOPHILS # BLD AUTO: 0.1 10E9/L (ref 0–0.2)
BASOPHILS NFR BLD AUTO: 1 %
BILIRUB SERPL-MCNC: 0.5 MG/DL (ref 0.2–1.3)
BILIRUB UR QL STRIP: NEGATIVE
BUN SERPL-MCNC: 4 MG/DL (ref 7–30)
CALCIUM SERPL-MCNC: 8.9 MG/DL (ref 8.5–10.1)
CHLORIDE SERPL-SCNC: 107 MMOL/L (ref 94–109)
CO2 SERPL-SCNC: 33 MMOL/L (ref 20–32)
COLOR UR AUTO: ABNORMAL
CREAT SERPL-MCNC: 0.66 MG/DL (ref 0.66–1.25)
DIFFERENTIAL METHOD BLD: ABNORMAL
EOSINOPHIL # BLD AUTO: 1.1 10E9/L (ref 0–0.7)
EOSINOPHIL NFR BLD AUTO: 17.2 %
ERYTHROCYTE [DISTWIDTH] IN BLOOD BY AUTOMATED COUNT: 12.2 % (ref 10–15)
GFR SERPL CREATININE-BSD FRML MDRD: ABNORMAL ML/MIN/1.7M2
GLUCOSE SERPL-MCNC: 91 MG/DL (ref 70–99)
GLUCOSE UR STRIP-MCNC: NEGATIVE MG/DL
HCT VFR BLD AUTO: 40.6 % (ref 40–53)
HGB BLD-MCNC: 13.5 G/DL (ref 13.3–17.7)
HGB UR QL STRIP: NEGATIVE
IMM GRANULOCYTES # BLD: 0 10E9/L (ref 0–0.4)
IMM GRANULOCYTES NFR BLD: 0.2 %
INTERPRETATION ECG - MUSE: NORMAL
KETONES UR STRIP-MCNC: NEGATIVE MG/DL
LEUKOCYTE ESTERASE UR QL STRIP: NEGATIVE
LYMPHOCYTES # BLD AUTO: 1.8 10E9/L (ref 0.8–5.3)
LYMPHOCYTES NFR BLD AUTO: 29.2 %
MCH RBC QN AUTO: 31.1 PG (ref 26.5–33)
MCHC RBC AUTO-ENTMCNC: 33.3 G/DL (ref 31.5–36.5)
MCV RBC AUTO: 94 FL (ref 78–100)
MONOCYTES # BLD AUTO: 0.5 10E9/L (ref 0–1.3)
MONOCYTES NFR BLD AUTO: 7.5 %
NEUTROPHILS # BLD AUTO: 2.8 10E9/L (ref 1.6–8.3)
NEUTROPHILS NFR BLD AUTO: 44.9 %
NITRATE UR QL: NEGATIVE
NRBC # BLD AUTO: 0 10*3/UL
NRBC BLD AUTO-RTO: 0 /100
PH UR STRIP: 8 PH (ref 5–7)
PLATELET # BLD AUTO: 225 10E9/L (ref 150–450)
POTASSIUM SERPL-SCNC: 3.5 MMOL/L (ref 3.4–5.3)
PROT SERPL-MCNC: 7.2 G/DL (ref 6.8–8.8)
RBC # BLD AUTO: 4.34 10E12/L (ref 4.4–5.9)
SODIUM SERPL-SCNC: 143 MMOL/L (ref 133–144)
SP GR UR STRIP: 1 (ref 1–1.03)
URN SPEC COLLECT METH UR: ABNORMAL
UROBILINOGEN UR STRIP-MCNC: 0 MG/DL (ref 0–2)
WBC # BLD AUTO: 6.2 10E9/L (ref 4–11)

## 2017-08-13 PROCEDURE — 25000128 H RX IP 250 OP 636: Performed by: EMERGENCY MEDICINE

## 2017-08-13 PROCEDURE — 96375 TX/PRO/DX INJ NEW DRUG ADDON: CPT

## 2017-08-13 PROCEDURE — 81003 URINALYSIS AUTO W/O SCOPE: CPT | Performed by: EMERGENCY MEDICINE

## 2017-08-13 PROCEDURE — 25000132 ZZH RX MED GY IP 250 OP 250 PS 637: Performed by: EMERGENCY MEDICINE

## 2017-08-13 PROCEDURE — 80053 COMPREHEN METABOLIC PANEL: CPT | Performed by: EMERGENCY MEDICINE

## 2017-08-13 PROCEDURE — 85025 COMPLETE CBC W/AUTO DIFF WBC: CPT | Performed by: EMERGENCY MEDICINE

## 2017-08-13 PROCEDURE — 96374 THER/PROPH/DIAG INJ IV PUSH: CPT

## 2017-08-13 PROCEDURE — 96376 TX/PRO/DX INJ SAME DRUG ADON: CPT

## 2017-08-13 PROCEDURE — 99285 EMERGENCY DEPT VISIT HI MDM: CPT | Mod: 25

## 2017-08-13 PROCEDURE — 74177 CT ABD & PELVIS W/CONTRAST: CPT

## 2017-08-13 PROCEDURE — 96361 HYDRATE IV INFUSION ADD-ON: CPT

## 2017-08-13 PROCEDURE — 93005 ELECTROCARDIOGRAM TRACING: CPT

## 2017-08-13 RX ORDER — IOPAMIDOL 755 MG/ML
500 INJECTION, SOLUTION INTRAVASCULAR ONCE
Status: COMPLETED | OUTPATIENT
Start: 2017-08-13 | End: 2017-08-13

## 2017-08-13 RX ORDER — ACETAMINOPHEN 325 MG/1
650 TABLET ORAL ONCE
Status: COMPLETED | OUTPATIENT
Start: 2017-08-13 | End: 2017-08-13

## 2017-08-13 RX ORDER — SODIUM CHLORIDE 9 MG/ML
1000 INJECTION, SOLUTION INTRAVENOUS CONTINUOUS
Status: DISCONTINUED | OUTPATIENT
Start: 2017-08-13 | End: 2017-08-13 | Stop reason: HOSPADM

## 2017-08-13 RX ORDER — MORPHINE SULFATE 4 MG/ML
4 INJECTION, SOLUTION INTRAMUSCULAR; INTRAVENOUS
Status: DISCONTINUED | OUTPATIENT
Start: 2017-08-13 | End: 2017-08-13 | Stop reason: HOSPADM

## 2017-08-13 RX ORDER — ONDANSETRON 2 MG/ML
4 INJECTION INTRAMUSCULAR; INTRAVENOUS EVERY 30 MIN PRN
Status: DISCONTINUED | OUTPATIENT
Start: 2017-08-13 | End: 2017-08-13 | Stop reason: HOSPADM

## 2017-08-13 RX ADMIN — MORPHINE SULFATE 4 MG: 4 INJECTION, SOLUTION INTRAMUSCULAR; INTRAVENOUS at 12:16

## 2017-08-13 RX ADMIN — ONDANSETRON 4 MG: 2 INJECTION INTRAMUSCULAR; INTRAVENOUS at 12:16

## 2017-08-13 RX ADMIN — MORPHINE SULFATE 4 MG: 4 INJECTION, SOLUTION INTRAMUSCULAR; INTRAVENOUS at 13:26

## 2017-08-13 RX ADMIN — SODIUM CHLORIDE 1000 ML: 9 INJECTION, SOLUTION INTRAVENOUS at 12:16

## 2017-08-13 RX ADMIN — IOPAMIDOL 76 ML: 755 INJECTION, SOLUTION INTRAVENOUS at 13:12

## 2017-08-13 RX ADMIN — SODIUM CHLORIDE 56 ML: 9 INJECTION, SOLUTION INTRAVENOUS at 13:16

## 2017-08-13 RX ADMIN — ACETAMINOPHEN 650 MG: 325 TABLET, FILM COATED ORAL at 14:41

## 2017-08-13 ASSESSMENT — ENCOUNTER SYMPTOMS
NUMBNESS: 0
WEAKNESS: 0
DYSURIA: 0
DIFFICULTY URINATING: 0
FEVER: 1
FREQUENCY: 0
CHILLS: 0
COUGH: 0
BACK PAIN: 1

## 2017-08-13 NOTE — ED NOTES
Patient presents with family member for back, abdominal pain & testicle fullness for about 9 day. MD at bedside.

## 2017-08-13 NOTE — ED AVS SNAPSHOT
[facility] Emergency Department    [address]    [facility] [address]    Phone:  [phone]    Fax:  [phone]                                       Luis Yates   MRN: [id number]    Department:  [facility] Emergency Department   Date of Visit:  8/13/2017           After Visit Summary Signature Page     I have received my discharge instructions, and my questions have been answered. I have discussed any challenges I see with this plan with the nurse or doctor.    ..........................................................................................................................................  Patient/Patient Representative Signature      ..........................................................................................................................................  Patient Representative Print Name and Relationship to Patient    ..................................................               ................................................  Date                                            Time    ..........................................................................................................................................  Reviewed by Signature/Title    ...................................................              ..............................................  Date                                                            Time

## 2017-08-13 NOTE — ED AVS SNAPSHOT
[facility] Emergency Department    [address]    [facility] [address]    Phone:  [phone]    Fax:  [phone]                                       Luis Yates   MRN: [id number]    Department:  [facility] Emergency Department   Date of Visit:  8/13/2017           Patient Information     Date Of Birth          1960        Your diagnoses for this visit were:     Abdominal pain, left lower quadrant     RLQ abdominal pain     Left inguinal hernia        You were seen by Sandie Estevez MD.      Follow-up Information     Go to Carola Grullon APRN CNP.    Specialty:  Nurse Practitioner - Family    Why:  For follow-up appointment Monday as previously scheduled    Contact information:    [address] [facility] [address]  [phone]          Follow up with [facility] Emergency Department.    Specialty:  EMERGENCY MEDICINE    Why:  If symptoms worsen    Contact information:    [address] [facility] [id number] [phone]        Discharge Instructions       Discharge Instructions  Abdominal Pain    Abdominal pain (belly pain) can be caused by many things. Your evaluation today does not show the exact cause for your pain. Your provider today has decided that it is unlikely your pain is due to a life threatening problem, or a problem requiring surgery or hospital admission. Sometimes those problems cannot be found right away, so it is very important that you follow up as directed.  Sometimes only the changes which occur over time allow the cause of your pain to be found.    Generally, every Emergency Department visit should have a follow-up clinic visit with either a primary or a specialty clinic/provider. Please follow-up as instructed by your emergency provider today. With abdominal pain, we often recommend very close follow-up, such as the following day.    ADULTS:  Return to the Emergency Department right away  if:      You get an oral temperature above 102oF or as directed by your provider.    You have blood in your stools. This may be bright red or appear as black, tarry stools.      You keep vomiting (throwing up) or cannot drink liquids.    You see blood when you vomit.     You cannot have a bowel movement or you cannot pass gas.    Your stomach gets bloated or bigger.    Your skin or the whites of your eyes look yellow.    You faint.    You have bloody, frequent or painful urination (peeing).    You have new symptoms or anything that worries you.    CHILDREN:  Return to the Emergency Department right away if your child has any of the above-listed symptoms or the following:      Pushes your hand away or screams/cries when his/her belly is touched.    You notice your child is very fussy or weak.    Your child is very tired and is too tired to eat or drink.    Your child is dehydrated.  Signs of dehydration can be:  o Significant change in the amount of wet diapers/urine.  o Your infant or child starts to have dry mouth and lips, or no saliva (spit) or tears.    PREGNANT WOMEN:  Return to the Emergency Department right away if you have any of the above-listed symptoms or the following:      You have bleeding, leaking fluid or passing tissue from the vagina.    You have worse pain or cramping, or pain in your shoulder or back.    You have vomiting that will not stop.    You have a temperature of 100oF or more.    Your baby is not moving as much as usual.    You faint.    You get a bad headache with or without eye problems and abdominal pain.    You have a seizure.    You have unusual discharge from your vagina and abdominal pain.    Abdominal pain is pretty common during pregnancy.  Your pain may or may not be related to your pregnancy. You should follow-up closely with your OB provider so they can evaluate you and your baby.  Until you follow-up with your regular provider, do the following:       Avoid sex and do not put  "anything in your vagina.    Drink clear fluids.    Only take medications approved by your provider.    MORE INFORMATION:    Appendicitis:  A possible cause of abdominal pain in any person who still has their appendix is acute appendicitis. Appendicitis is often hard to diagnose.  Testing does not always rule out early appendicitis or other causes of abdominal pain. Close follow-up with your provider and re-evaluations may be needed to figure out the reason for your abdominal pain.    Follow-up:  It is very important that you make an appointment with your clinic and go to the appointment.  If you do not follow-up with your primary provider, it may result in missing an important development which could result in permanent injury or disability and/or lasting pain.  If there is any problem keeping your appointment, call your provider or return to the Emergency Department.    Medications:  Take your medications as directed by your provider today.  Before using over-the-counter medications, ask your provider and make sure to take the medications as directed.  If you have any questions about medications, ask your provider.    Diet:  Resume your normal diet as much as possible, but do not eat fried, fatty or spicy foods while you have pain.  Do not drink alcohol or have caffeine.  Do not smoke tobacco.    Probiotics: If you have been given an antibiotic, you may want to also take a probiotic pill or eat yogurt with live cultures. Probiotics have \"good bacteria\" to help your intestines stay healthy. Studies have shown that probiotics help prevent diarrhea (loose stools) and other intestine problems (including C. diff infection) when you take antibiotics. You can buy these without a prescription in the pharmacy section of the store.     If you were given a prescription for medicine here today, be sure to read all of the information (including the package insert) that comes with your prescription.  This will include important " information about the medicine, its side effects, and any warnings that you need to know about.  The pharmacist who fills the prescription can provide more information and answer questions you may have about the medicine.  If you have questions or concerns that the pharmacist cannot address, please call or return to the Emergency Department.       Remember that you can always come back to the Emergency Department if you are not able to see your regular provider in the amount of time listed above, if you get any new symptoms, or if there is anything that worries you.      Future Appointments        Provider Department Dept Phone Center    8/14/2017 9:30 AM Shoshana López MD; LANGUAGE [facility] [phone] [place]      24 Hour Appointment Hotline       To make an appointment at any [facility], call [phone] ([phone]). If you don't have a family doctor or clinic, we will help you find one. [facility] are conveniently located to serve the needs of you and your family.             Review of your medicines      Our records show that you are taking the medicines listed below. If these are incorrect, please call your family doctor or clinic.        Dose / Directions Last dose taken    amLODIPine 5 MG tablet   Commonly known as:  NORVASC   Dose:  5 mg   Quantity:  90 tablet        Take 1 tablet (5 mg) by mouth daily   Refills:  1        atenolol 100 MG tablet   Commonly known as:  TENORMIN   Dose:  100 mg   Quantity:  90 tablet        Take 1 tablet (100 mg) by mouth daily   Refills:  1        ibuprofen 400 MG tablet   Commonly known as:  ADVIL/MOTRIN   Dose:  400 mg   Quantity:  200 tablet        Take 1 tablet (400 mg) by mouth every 8 hours as needed for moderate pain   Refills:  3        losartan 100 MG tablet   Commonly known as:  COZAAR   Dose:  100 mg   Quantity:  90 tablet        Take 1 tablet (100 mg) by mouth daily   Refills:  3        omeprazole 20 MG CR  capsule   Commonly known as:  priLOSEC   Dose:  20 mg   Quantity:  180 capsule        Take 1 capsule (20 mg) by mouth 2 times daily   Refills:  1        ondansetron 4 MG ODT tab   Commonly known as:  ZOFRAN-ODT   Dose:  4 mg   Quantity:  20 tablet        Take 1 tablet (4 mg) by mouth every 6 hours as needed for nausea or vomiting   Refills:  0        sucralfate 1 GM tablet   Commonly known as:  CARAFATE   Dose:  1 g   Quantity:  360 tablet        Take 1 tablet (1 g) by mouth 4 times daily   Refills:  1                Procedures and tests performed during your visit     CBC with platelets differential    CT Abdomen Pelvis w Contrast    Comprehensive metabolic panel    EKG 12-lead, tracing only    UA reflex to Microscopic      Orders Needing Specimen Collection     None      Pending Results     No orders found from 8/11/2017 to 8/14/2017.            Pending Culture Results     No orders found from 8/11/2017 to 8/14/2017.            Pending Results Instructions     If you had any lab results that were not finalized at the time of your Discharge, you can call the ED Lab Result RN at [phone]. You will be contacted by this team for any positive Lab results or changes in treatment. The nurses are available 7 days a week from 10A to 6:30P.  You can leave a message 24 hours per day and they will return your call.        Test Results From Your Hospital Stay        8/13/2017  1:27 PM      Narrative     Exam: CT ABDOMEN PELVIS W CONTRAST  8/13/2017 1:20 PM    History: Bilateral inguinal pain, history of right inguinal surgery.    Comparison: 7/16/2017    Technique: Volumetric acquisition with reconstruction in the axial,  coronal planes through the abdomen and pelvis with contrast. Radiation  dose for this scan was reduced using automated exposure control,  adjustment of the mA and/or kV according to patient size, or iterative  reconstruction technique.    Contrast: 76mL Isovue-370    Findings:   Lung Bases: Mild dependent  atelectasis. No pleural or pericardial  effusion.    Abdomen: Liver, spleen, kidneys, adrenal glands, pancreas and  gallbladder are normal.    No areas of bowel wall thickening or bowel dilatation. Normal  appendix.    Small amount of fluid and fat stranding in the left inguinal canal,  appears stable to slightly decreased since 7/16/2017. No free fluid  identified. No herniated bowel identified. Small fat-containing  umbilical hernia is also again seen, this is unchanged.    Bones: No concerning lytic or sclerotic lesions.        Impression     Impression: Small fat-containing left inguinal hernia with a small  amount of fluid and mild fat stranding. The amount of fluid appears  slightly decreased since 7/16/2017. No other findings in the abdomen  or pelvis to suggest an etiology of the patient's symptoms.    ZEV NINA         8/13/2017 11:48 AM      Component Results     Component Value Ref Range & Units Status    Color Urine Straw  Final    Appearance Urine Clear  Final    Glucose Urine Negative NEG mg/dL Final    Bilirubin Urine Negative NEG Final    Ketones Urine Negative NEG mg/dL Final    Specific Gravity Urine 1.005 1.003 - 1.035 Final    Blood Urine Negative NEG Final    pH Urine 8.0 (H) 5.0 - 7.0 pH Final    Protein Albumin Urine Negative NEG mg/dL Final    Urobilinogen mg/dL 0.0 0.0 - 2.0 mg/dL Final    Nitrite Urine Negative NEG Final    Leukocyte Esterase Urine Negative NEG Final    Source Midstream Urine  Final         8/13/2017 12:25 PM      Component Results     Component Value Ref Range & Units Status    WBC 6.2 4.0 - 11.0 10e9/L Final    RBC Count 4.34 (L) 4.4 - 5.9 10e12/L Final    Hemoglobin 13.5 13.3 - 17.7 g/dL Final    Hematocrit 40.6 40.0 - 53.0 % Final    MCV 94 78 - 100 fl Final    MCH 31.1 26.5 - 33.0 pg Final    MCHC 33.3 31.5 - 36.5 g/dL Final    RDW 12.2 10.0 - 15.0 % Final    Platelet Count 225 150 - 450 10e9/L Final    Diff Method Automated Method  Final    % Neutrophils 44.9 %  Final    % Lymphocytes 29.2 % Final    % Monocytes 7.5 % Final    % Eosinophils 17.2 % Final    % Basophils 1.0 % Final    % Immature Granulocytes 0.2 % Final    Nucleated RBCs 0 0 /100 Final    Absolute Neutrophil 2.8 1.6 - 8.3 10e9/L Final    Absolute Lymphocytes 1.8 0.8 - 5.3 10e9/L Final    Absolute Monocytes 0.5 0.0 - 1.3 10e9/L Final    Absolute Eosinophils 1.1 (H) 0.0 - 0.7 10e9/L Final    Absolute Basophils 0.1 0.0 - 0.2 10e9/L Final    Abs Immature Granulocytes 0.0 0 - 0.4 10e9/L Final    Absolute Nucleated RBC 0.0  Final         8/13/2017 12:46 PM      Component Results     Component Value Ref Range & Units Status    Sodium 143 133 - 144 mmol/L Final    Potassium 3.5 3.4 - 5.3 mmol/L Final    Chloride 107 94 - 109 mmol/L Final    Carbon Dioxide 33 (H) 20 - 32 mmol/L Final    Anion Gap 3 3 - 14 mmol/L Final    Glucose 91 70 - 99 mg/dL Final    Urea Nitrogen 4 (L) 7 - 30 mg/dL Final    Creatinine 0.66 0.66 - 1.25 mg/dL Final    GFR Estimate >90  Non  GFR Calc   >60 mL/min/1.7m2 Final    GFR Estimate If Black >90   GFR Calc   >60 mL/min/1.7m2 Final    Calcium 8.9 8.5 - 10.1 mg/dL Final    Bilirubin Total 0.5 0.2 - 1.3 mg/dL Final    Albumin 3.9 3.4 - 5.0 g/dL Final    Protein Total 7.2 6.8 - 8.8 g/dL Final    Alkaline Phosphatase 55 40 - 150 U/L Final    ALT 17 0 - 70 U/L Final    AST 14 0 - 45 U/L Final                Clinical Quality Measure: Blood Pressure Screening     Your blood pressure was checked while you were in the emergency department today. The last reading we obtained was  BP: 164/88 . Please read the guidelines below about what these numbers mean and what you should do about them.  If your systolic blood pressure (the top number) is less than 120 and your diastolic blood pressure (the bottom number) is less than 80, then your blood pressure is normal. There is nothing more that you need to do about it.  If your systolic blood pressure (the top number) is 120-139  "or your diastolic blood pressure (the bottom number) is 80-89, your blood pressure may be higher than it should be. You should have your blood pressure rechecked within a year by a primary care provider.  If your systolic blood pressure (the top number) is 140 or greater or your diastolic blood pressure (the bottom number) is 90 or greater, you may have high blood pressure. High blood pressure is treatable, but if left untreated over time it can put you at risk for heart attack, stroke, or kidney failure. You should have your blood pressure rechecked by a primary care provider within the next 4 weeks.  If your provider in the emergency department today gave you specific instructions to follow-up with your doctor or provider even sooner than that, you should follow that instruction and not wait for up to 4 weeks for your follow-up visit.        Thank you for choosing [facility]       Thank you for choosing [facility] for your care. Our goal is always to provide you with excellent care. Hearing back from our patients is one way we can continue to improve our services. Please take a few minutes to complete the written survey that you may receive in the mail after you visit with us. Thank you!        [employer] Information     [employer] lets you send messages to your doctor, view your test results, renew your prescriptions, schedule appointments and more. To sign up, go to [email or web address] . Click on \"Log in\" on the left side of the screen, which will take you to the Welcome page. Then click on \"Sign up Now\" on the right side of the page.     You will be asked to enter the access code listed below, as well as some personal information. Please follow the directions to create your username and password.     Your access code is: [email or web address]  Expires: 10/23/2017 10:22 AM     Your access code will  in 90 days. If you need help or a new code, please call your [facility] clinic or [phone].        Care EveryWhere ID     " This is your Care EveryWhere ID. This could be used by other organizations to access your [facility] medical records  [id number]        Equal Access to Services     LILIANA KENDALL : Abbie Chavira, omero peter, stephan ford, [place] [facility]. So [facility] [phone].    ATENCIÓN: Si habla español, tiene a urban disposición servicios gratuitos de asistencia lingüística. Llame al [phone].    We comply with applicable federal civil rights laws and [place] laws. We do not discriminate on the basis of race, color, national origin, age, disability sex, sexual orientation or gender identity.            After Visit Summary       This is your record. Keep this with you and show to your community pharmacist(s) and doctor(s) at your next visit.

## 2017-08-13 NOTE — ED PROVIDER NOTES
History     Chief Complaint:  Back pain    HPI   HPI was translated by the patient's daughter.  Luis Yates is a 57 year old male who presents with his daughter to the ED for evaluation of back pain. The patient reports that he has chronic lower back pain that for the past 8-9 days radiates into his testicles. He notes that his testicles feel full, swollen, and there is a sharp pain. He reports that his biggest concern today is pain in his groin and he is concerned something is wrong with his previous hernia repair. The pain is predominately in his testicles. He reports having a subjective fever. The patient denies dysuria, difficulty urinating, weakness, numbness, urinary incontinence or retention, or previous symptoms like this before. Of note the patient had a hernia repair 2 months ago.    Allergies:  No known drug allergies     Medications:    Ibuprofen  Cozaar  Tenormin  Norvasc  Prilosec  Carafate  Zofran    Past Medical History:    Asthma  GERD  Gout  Hypertension  Hyponatremia  Hypercholestermia    Past Surgical History:    Hernia Repair   Laparoscopic Herniorrhaphy Inguinal Bilateral    Family History:    History reviewed. No pertinent family history.     Social History:  Smoking status: Never  Alcohol use: No  Marital Status:   [2]     Review of Systems   Constitutional: Positive for fever. Negative for chills.   Respiratory: Negative for cough.    Genitourinary: Positive for scrotal swelling and testicular pain. Negative for difficulty urinating, dysuria, enuresis and frequency.   Musculoskeletal: Positive for back pain.   Neurological: Negative for weakness and numbness.   All other systems reviewed and are negative.      Physical Exam     Patient Vitals for the past 24 hrs:   BP Temp Temp src Heart Rate SpO2   08/13/17 1355 164/88 - - - 95 %   08/13/17 1325 (!) 195/94 - - - 93 %   08/13/17 1250 160/84 - - - 95 %   08/13/17 1235 (!) 166/91 - - - 97 %   08/13/17 1220 176/89 - - - 99 %    08/13/17 1205 (!) 176/95 - - - 99 %   08/13/17 1150 (!) 186/97 - - - 96 %   08/13/17 1135 (!) 187/97 - - - 97 %   08/13/17 1120 (!) 188/91 - - - 97 %   08/13/17 1118 (!) 194/95 - - - 97 %   08/13/17 1108 (!) 211/106 98.4  F (36.9  C) Oral 72 94 %       Physical Exam  Constitutional: Alert, attentive, GCS 15, speaks  dialect (daughter translating)   HENT:    Nose: Nose normal.    Mouth/Throat: Oropharynx is clear, mucous membranes are moist   Eyes: Normal conjunctiva. Pupils are equal, round, and reactive to light.   CV: regular rate and rhythm; no murmurs, rubs or gallups  Chest: Effort normal and breath sounds normal.   GI:  Lower abdominal tenderness to palpation, no rebound or guarding.  No distension. Hypoactive bowel sounds.  : Tenderness to palpation in bilateral groin area.  No bulges or edema noted.  Testicles nontender to palpation.  No nodules or masses palpation.  No erythema.  No penile discharge.  No gross blood on digital rectal exam. No prostate tenderness or bogginess. Normal rectal tone.    MSK: Normal range of motion  Back: tenderness to palpation in lumbar spine, and left paraspinal muscles   Neurological: Alert, attentive, strength 5/5 in upper and lower extremities, 2+ patellar reflexes bilaterally, sensation intact   Skin: Skin is warm and dry.      Emergency Department Course   ECG (11:31:05):  Rate 63 bpm. FL interval 144. QRS duration 80. QT/QTc 382/390. P-R-T axes 65 30 25. Normal sinus rhythm. Normal ECG. No significant change compared to 7/21/17. Previously sinus bradycardia/ Interpreted at 11:36pm by Sandie Estevez MD.    Imaging:  Radiographic findings were communicated with the patient who voiced understanding of the findings.    CT: Abdomen Pelvis, w/o Contrast:  Small fat-containing left inguinal hernia with a small  amount of fluid and mild fat stranding. The amount of fluid appears  slightly decreased since 7/16/2017. No other findings in the abdomen  or pelvis to  suggest an etiology of the patient's symptoms.  As read by Radiology.    Laboratory:  CBC: WNL (WBC 6.2, HGB 13.5, )   CMP: Carbon Dioxide 33(H), BUN 4(L), o/w WNL (Creatinine 0.66)  UA: pH 8.0(H), o/w Negative    Interventions:  1216: Morphine 4 mg, IV  1216: Zofran 4 mg, IV  1216: NS 1L IV Bolus  1316: NS 1L IV Bolus  1326: Morphine 4 mg, IV  1441: Tylenol 650 mg, Oral    Emergency Department Course:  Past medical records, nursing notes, and vitals reviewed.  11:05am: I performed an exam of the patient and obtained history, as documented above.  IV inserted and blood drawn.  The patient was sent for a CT while in the emergency department, findings above.    2:15pm: I rechecked the patient. Explained findings to the patient. His abdomen pain is improved after pain medication but endorses a mild headache, will be given Tylenol before discharge. Patient has follow up with primary care on Monday.    I rechecked the patient. Findings and plan explained to the Patient. Patient discharged home with instructions regarding supportive care, medications, and reasons to return. The importance of close follow-up was reviewed.     Impression & Plan    Medical Decision Makin-year-old male presenting with multiple complaints including lower abdominal pain, groin pain, and back pain.  Differential diagnosis includes inguinal hernia, testicular torsion, urinary tract infection, pyelonephritis, prostatitis, epididymitis, diverticulitis, kidney stone, chronic low back pain, herniated disc.  His neurologic exam is normal and I have low suspicion of cauda equina syndrome.  His abdominal/groin pain appears to be his biggest concern.  Labs are reassuring and he does not have evidence of urinary tract infection or prostatitis.  A CT abdomen and pelvis shows a fat containing left inguinal hernia, without evidence of emergent abdominal process.  Reexamination of abdomen is benign.  He never had testicular pain on exam.  He felt  improved after after the above interventions.  Instructed that he should follow-up with his primary care provider.  Return precautions were discussed.    Diagnosis:    ICD-10-CM   1. Abdominal pain, left lower quadrant R10.32   2. RLQ abdominal pain R10.31   3. Left inguinal hernia K40.90       Disposition:  discharged to home        Ladi Jonny  8/13/2017   [facility] EMERGENCY DEPARTMENT  I, Ladi Fuller, am serving as a scribe at 11:05 AM on 8/13/2017 to document services personally performed by Sandie Estevez MD based on my observations and the provider's statements to me.        Sandie Estevez MD  08/13/17 1925

## 2017-08-14 ENCOUNTER — OFFICE VISIT (OUTPATIENT)
Dept: INTERNAL MEDICINE | Facility: CLINIC | Age: 57
End: 2017-08-14
Payer: COMMERCIAL

## 2017-08-14 VITALS
DIASTOLIC BLOOD PRESSURE: 80 MMHG | TEMPERATURE: 98.7 F | SYSTOLIC BLOOD PRESSURE: 172 MMHG | HEART RATE: 62 BPM | BODY MASS INDEX: 26.74 KG/M2 | WEIGHT: 150.9 LBS | OXYGEN SATURATION: 97 % | HEIGHT: 63 IN

## 2017-08-14 DIAGNOSIS — G89.29 CHRONIC LOW BACK PAIN, UNSPECIFIED BACK PAIN LATERALITY, WITH SCIATICA PRESENCE UNSPECIFIED: ICD-10-CM

## 2017-08-14 DIAGNOSIS — R12 HEARTBURN: Primary | ICD-10-CM

## 2017-08-14 DIAGNOSIS — I10 HTN (HYPERTENSION), BENIGN: ICD-10-CM

## 2017-08-14 DIAGNOSIS — M54.5 CHRONIC LOW BACK PAIN, UNSPECIFIED BACK PAIN LATERALITY, WITH SCIATICA PRESENCE UNSPECIFIED: ICD-10-CM

## 2017-08-14 PROCEDURE — 99214 OFFICE O/P EST MOD 30 MIN: CPT | Performed by: INTERNAL MEDICINE

## 2017-08-14 RX ORDER — ACETAMINOPHEN 325 MG/1
325-650 TABLET ORAL 3 TIMES DAILY PRN
Qty: 100 TABLET | Refills: 0 | Status: SHIPPED | OUTPATIENT
Start: 2017-08-14 | End: 2022-02-22

## 2017-08-14 RX ORDER — MAGNESIUM HYDROXIDE/ALUMINUM HYDROXICE/SIMETHICONE 120; 1200; 1200 MG/30ML; MG/30ML; MG/30ML
30 SUSPENSION ORAL EVERY 4 HOURS PRN
Qty: 355 ML | Refills: 0 | Status: SHIPPED | OUTPATIENT
Start: 2017-08-14 | End: 2022-02-22

## 2017-08-14 NOTE — PROGRESS NOTES
Dr Bates's note      Patient's instructions / PLAN:                                                        Plan:  1. Continue same meds, same doses for now   2. Do not take Ibuprofen  3. Take Tylenol for back pain  4. Mylanta 30 ml every 4 hours as needed for heartburn        ASSESSMENT & PLAN:                                                      (R12) Heartburn  (primary encounter diagnosis)  Comment: Not controlled   Plan: alum & mag hydroxide-simethicone         (MYLANTA/MAALOX) 200-200-20 MG/5ML SUSP         suspension            (M54.5,  G89.29) Chronic low back pain, unspecified back pain laterality, with sciatica presence unspecified  Comment:   Plan: acetaminophen (TYLENOL) 325 MG tablet            (I10) HTN (hypertension), benign  Comment: Not controlled   Plan: low salt diet, f/u when back to [place]       Chief complaint:                                                      ER follow-up  Heartburn  Due to language barrier, an  was present during the history-taking and subsequent discussion (and for part of the physical exam) with this patient.       SUBJECTIVE:                                                    Luis Yates is a 57 year old male who presents to clinic today for the following health issues:    he was evaluated yesterday in the emergency room for abdominal pain.  I reviewed ER physician note.  She also had abdominal CT which basically didn't show any acute pathology.    He feels better today, but still some heartburn. He takes carafate qid and omeprazole bid.     No exertional CP. The heartburn doesn't get worse with exercise     Upper endoscopy 7/21/2017: Normal esophagus. Biopsied. - Erythematous mucosa in the greater curvature of the gastric body. - Erythematous duodenopathy.     He doesn't feel better with carafate and omeprazole     Takes ibuprofen -  6 tabs in 2 weeks     Leaves for Etiopia tomorrow    Some urine leaking - wants meds. We will do urology referral when  "back to [facility]    Abd CT yesterday: Impression: Small fat-containing left inguinal hernia with a small  amount of fluid and mild fat stranding. The amount of fluid appears  slightly decreased since 7/16/2017. No other findings in the abdomen  or pelvis to suggest an etiology of the patient's symptoms.    ED/UC Followup:    Facility:  [facility], [facility]  Date of visit: 08/13/2017  Reason for visit: Abdominal pain  Current Status: Pt is still having heartburn      ER MD: 57-year-old male presenting with multiple complaints including lower abdominal pain, groin pain, and back pain.  Differential diagnosis includes inguinal hernia, testicular torsion, urinary tract infection, pyelonephritis, prostatitis, epididymitis, diverticulitis, kidney stone, chronic low back pain, herniated disc.  His neurologic exam is normal and I have low suspicion of cauda equina syndrome.  His abdominal/groin pain appears to be his biggest concern.  Labs are reassuring and he does not have evidence of urinary tract infection or prostatitis.  A CT abdomen and pelvis shows a fat containing left inguinal hernia, without evidence of emergent abdominal process.  Reexamination of abdomen is benign.  He never had testicular pain on exam.  He felt improved after after the above interventions.  Instructed that he should follow-up with his primary care provider.  Return precautions were discussed.    Review of Systems:                                                      ROS: negative for fever, chills, cough, wheezes, chest pain, shortness of breath, vomiting, abdominal pain, leg swelling     A 10-point review of systems was obtained.  Those pertinent are above and in the in the Subjective section.  The rest of the systems are negative.           OBJECTIVE:             Physical exam:  Blood pressure 172/80, pulse 62, temperature 98.7  F (37.1  C), temperature source Oral, height 5' 3\" (1.6 m), weight 150 lb 14.4 oz (68.4 kg), SpO2 97 %.   NAD, appears " comfortable  Skin: no rashes   Neck: supple, no JVD,  No thyroidmegaly. Lymph nodes nonpalpable cervical and supraclavicular.  Chest: clear to auscultation bilaterally, good respiratory effort  Heart: S1 S2, RRR, no mgr appreciated  Abdomen: soft, epig tender, no hepatosplenomegaly or masses appreciated, no abdominal bruit, present bowel sounds  Extremities: no edema,   Neurologic: A, Ox3, no focal signs appreciated    PMHx: reviewed  Past Medical History:   Diagnosis Date     Asthma, mild intermittent      Asthma, mild intermittent      GERD (gastroesophageal reflux disease)      Gout      HTN (hypertension)      HTN (hypertension), benign       PSHx: reviewed  Past Surgical History:   Procedure Laterality Date     ESOPHAGOSCOPY, GASTROSCOPY, DUODENOSCOPY (EGD), COMBINED N/A 7/22/2017    Procedure: COMBINED ESOPHAGOSCOPY, GASTROSCOPY, DUODENOSCOPY (EGD), BIOPSY SINGLE OR MULTIPLE;  COMBINED ESOPHAGOSCOPY, GASTROSCOPY, DUODENOSCOPY with biopsies ;  Surgeon: Darrick Moseley MD;  Location:  GI     HERNIA REPAIR  2010    left      HERNIA REPAIR  2010     LAPAROSCOPIC HERNIORRHAPHY INGUINAL BILATERAL Bilateral 6/1/2017    Procedure: LAPAROSCOPIC HERNIORRHAPHY INGUINAL BILATERAL;  LAPAROSCOPIC HERNIORRHAPHY INGUINAL BILATERAL with mesh;  Surgeon: Pola Barbosa MD;  Location:  OR        Meds: reviewed  Current Outpatient Prescriptions   Medication Sig Dispense Refill     ibuprofen (ADVIL/MOTRIN) 400 MG tablet Take 1 tablet (400 mg) by mouth every 8 hours as needed for moderate pain 200 tablet 3     losartan (COZAAR) 100 MG tablet Take 1 tablet (100 mg) by mouth daily 90 tablet 3     atenolol (TENORMIN) 100 MG tablet Take 1 tablet (100 mg) by mouth daily 90 tablet 1     amLODIPine (NORVASC) 5 MG tablet Take 1 tablet (5 mg) by mouth daily 90 tablet 1     omeprazole (PRILOSEC) 20 MG CR capsule Take 1 capsule (20 mg) by mouth 2 times daily 180 capsule 1     sucralfate (CARAFATE) 1 GM tablet Take 1 tablet (1  g) by mouth 4 times daily (Patient not taking: Reported on 8/14/2017) 360 tablet 1     ondansetron (ZOFRAN-ODT) 4 MG ODT tab Take 1 tablet (4 mg) by mouth every 6 hours as needed for nausea or vomiting (Patient not taking: Reported on 8/14/2017) 20 tablet 0       Soc Hx: reviewed  Fam Hx: reviewed          Shoshana Bates MD  Internal Medicine

## 2017-08-14 NOTE — NURSING NOTE
"Chief Complaint   Patient presents with     RECHECK     Pt is F/U on abdominal pain and has chest pain presently, Per Pt it comes and goes       Initial /80 (BP Location: Left arm, Patient Position: Sitting, Cuff Size: Adult Large)  Pulse 62  Temp 98.7  F (37.1  C) (Oral)  Ht 5' 3\" (1.6 m)  Wt 150 lb 14.4 oz (68.4 kg)  SpO2 97%  BMI 26.73 kg/m2 Estimated body mass index is 26.73 kg/(m^2) as calculated from the following:    Height as of this encounter: 5' 3\" (1.6 m).    Weight as of this encounter: 150 lb 14.4 oz (68.4 kg).  Medication Reconciliation: complete   Evelyn Damon MA    "

## 2017-08-14 NOTE — PATIENT INSTRUCTIONS
Plan:  1. Continue same meds, same doses for now   2. Do not take Ibuprofen  3. Take Tylenol for back pain  4. Mylanta 30 ml every 4 hours as needed for heartburn

## 2017-08-14 NOTE — MR AVS SNAPSHOT
"              After Visit Summary   8/14/2017    Luis Yates    MRN: [id number]           Patient Information     Date Of Birth          1960        Visit Information        Provider Department      8/14/2017 9:30 AM Shoshana López MD; LANGUAGE [facility]        Today's Diagnoses     Chronic low back pain, unspecified back pain laterality, with sciatica presence unspecified    -  1    Heartburn          Care Instructions    Plan:  1. Continue same meds, same doses for now   2. Do not take Ibuprofen  3. Take Tylenol for back pain  4. Mylanta 30 ml every 4 hours as needed for heartburn          Follow-ups after your visit        Who to contact     If you have questions or need follow up information about today's clinic visit or your schedule please contact [facility] directly at [phone].  Normal or non-critical lab and imaging results will be communicated to you by MyChart, letter or phone within 4 business days after the clinic has received the results. If you do not hear from us within 7 days, please contact the clinic through MyChart or phone. If you have a critical or abnormal lab result, we will notify you by phone as soon as possible.  Submit refill requests through [employer] or call your pharmacy and they will forward the refill request to us. Please allow 3 business days for your refill to be completed.          Additional Information About Your Visit        MyChart Information     [employer] lets you send messages to your doctor, view your test results, renew your prescriptions, schedule appointments and more. To sign up, go to [email or web address] . Click on \"Log in\" on the left side of the screen, which will take you to the Welcome page. Then click on \"Sign up Now\" on the right side of the page.     You will be asked to enter the access code listed below, as well as some personal information. Please follow the directions to create your " "username and password.     Your access code is: [email or web address]  Expires: 10/23/2017 10:22 AM     Your access code will  in 90 days. If you need help or a new code, please call your [facility] or [phone].        Care EveryWhere ID     This is your Care EveryWhere ID. This could be used by other organizations to access your [place] medical records  [id number]        Your Vitals Were     Pulse Temperature Height Pulse Oximetry BMI (Body Mass Index)       62 98.7  F (37.1  C) (Oral) 5' 3\" (1.6 m) 97% 26.73 kg/m2        Blood Pressure from Last 3 Encounters:   17 172/80   17 164/88   08/10/17 148/72    Weight from Last 3 Encounters:   17 150 lb 14.4 oz (68.4 kg)   08/10/17 152 lb (68.9 kg)   17 145 lb (65.8 kg)              Today, you had the following     No orders found for display         Today's Medication Changes          These changes are accurate as of: 17 10:22 AM.  If you have any questions, ask your nurse or doctor.               Start taking these medicines.        Dose/Directions    acetaminophen 325 MG tablet   Commonly known as:  TYLENOL   Used for:  Chronic low back pain, unspecified back pain laterality, with sciatica presence unspecified   Started by:  Shoshana López MD        Dose:  325-650 mg   Take 1-2 tablets (325-650 mg) by mouth 3 times daily as needed for mild pain   Quantity:  100 tablet   Refills:  0       alum & mag hydroxide-simethicone 200-200-20 MG/5ML Susp suspension   Commonly known as:  MYLANTA/MAALOX   Used for:  Heartburn   Started by:  Shoshana López MD        Dose:  30 mL   Take 30 mLs by mouth every 4 hours as needed for indigestion   Quantity:  355 mL   Refills:  0            Where to get your medicines      These medications were sent to [id number] - [place], [place] - [address] [place] [address], [facility] [address]     Phone:  [phone]     acetaminophen 325 MG tablet    alum " & mag hydroxide-simethicone 200-200-20 MG/5ML Susp suspension                Primary Care Provider Office Phone # Fax #    PAYAL Baker [facility] [phone]       [address] [facility] [address]        Equal Access to Services     LILIANA KENDALL : Hadii aad ku hadasho Soomaali, waaxda luqadaha, qaybta kaalmada adeegyada, waxay idiin hayaan adeeg khpatricksh laja . So [facility] [phone].    ATENCIÓN: Si habla español, tiene a urban disposición servicios gratuitos de asistencia lingüística. Jessica al [phone].    We comply with applicable federal civil rights laws and [place] laws. We do not discriminate on the basis of race, color, national origin, age, disability sex, sexual orientation or gender identity.            Thank you!     Thank you for choosing [facility]  for your care. Our goal is always to provide you with excellent care. Hearing back from our patients is one way we can continue to improve our services. Please take a few minutes to complete the written survey that you may receive in the mail after your visit with us. Thank you!             Your Updated Medication List - Protect others around you: Learn how to safely use, store and throw away your medicines at www.disposemymeds.org.          This list is accurate as of: 8/14/17 10:22 AM.  Always use your most recent med list.                   Brand Name Dispense Instructions for use Diagnosis    acetaminophen 325 MG tablet    TYLENOL    100 tablet    Take 1-2 tablets (325-650 mg) by mouth 3 times daily as needed for mild pain    Chronic low back pain, unspecified back pain laterality, with sciatica presence unspecified       alum & mag hydroxide-simethicone 200-200-20 MG/5ML Susp suspension    MYLANTA/MAALOX    355 mL    Take 30 mLs by mouth every 4 hours as needed for indigestion    Heartburn       amLODIPine 5 MG tablet    NORVASC    90 tablet    Take 1 tablet (5 mg) by mouth daily    HTN (hypertension),  benign       atenolol 100 MG tablet    TENORMIN    90 tablet    Take 1 tablet (100 mg) by mouth daily    HTN (hypertension), benign       ibuprofen 400 MG tablet    ADVIL/MOTRIN    200 tablet    Take 1 tablet (400 mg) by mouth every 8 hours as needed for moderate pain    Chronic bilateral low back pain without sciatica       losartan 100 MG tablet    COZAAR    90 tablet    Take 1 tablet (100 mg) by mouth daily    HTN (hypertension), benign       omeprazole 20 MG CR capsule    priLOSEC    180 capsule    Take 1 capsule (20 mg) by mouth 2 times daily    Gastroesophageal reflux disease without esophagitis       ondansetron 4 MG ODT tab    ZOFRAN-ODT    20 tablet    Take 1 tablet (4 mg) by mouth every 6 hours as needed for nausea or vomiting    Abdominal pain, epigastric       sucralfate 1 GM tablet    CARAFATE    360 tablet    Take 1 tablet (1 g) by mouth 4 times daily    Gastroesophageal reflux disease without esophagitis

## 2018-02-15 ENCOUNTER — TELEPHONE (OUTPATIENT)
Dept: INTERNAL MEDICINE | Facility: CLINIC | Age: 58
End: 2018-02-15

## 2018-08-15 ENCOUNTER — TELEPHONE (OUTPATIENT)
Dept: INTERNAL MEDICINE | Facility: CLINIC | Age: 58
End: 2018-08-15

## 2018-08-15 NOTE — LETTER
[facility] [place] [address] [place] [address]  [phone]    8/15/2018    Luis Yates  [address] [place] [facility] [address]      Dear Luis    At [facility] we care about your health and well-being.  A review of your chart has indicated that you are due for a follow up appointment for your blood pressure and Asthma.  Please contact us at [phone] to schedule an appointment.      If you have already had one or all of the above screening tests at another facility, please call us to update your chart.        [facility] Internal Medicine  Healthcare Team

## 2018-08-15 NOTE — TELEPHONE ENCOUNTER
Panel Management Review      Patient has the following on his problem list:     Asthma review   No flowsheet data found.   1. Is Asthma diagnosis on the Problem List? Yes    2. Is Asthma listed on Health Maintenance? Yes    3. Patient is due for:  ACT    Hypertension   Last three blood pressure readings:  BP Readings from Last 3 Encounters:   08/14/17 172/80   08/13/17 164/88   08/10/17 148/72     Blood pressure: FAILED    HTN Guidelines:  Age 18-59 BP range:  Less than 140/90  Age 60-85 with Diabetes:  Less than 140/90  Age 60-85 without Diabetes:  less than 150/90      Composite cancer screening  Chart review shows that this patient is due/due soon for the following Colonoscopy  Summary:    Patient is due/failing the following:   ACT,HTN,Colonoscopy, follow up    Action needed:   Patient needs office visit for above.    Type of outreach:    Sent letter.    Questions for provider review:    None                                                                                                                                      ANIKA Agudelo       Chart routed to none .

## 2018-09-05 DIAGNOSIS — I10 HTN (HYPERTENSION), BENIGN: ICD-10-CM

## 2018-09-05 RX ORDER — LOSARTAN POTASSIUM 100 MG/1
100 TABLET ORAL DAILY
Qty: 30 TABLET | Refills: 0 | Status: SHIPPED | OUTPATIENT
Start: 2018-09-05 | End: 2022-02-22

## 2018-09-05 RX ORDER — ATENOLOL 100 MG/1
100 TABLET ORAL DAILY
Qty: 30 TABLET | Refills: 0 | Status: SHIPPED | OUTPATIENT
Start: 2018-09-05 | End: 2022-02-22

## 2018-09-05 NOTE — TELEPHONE ENCOUNTER
"Requested Prescriptions   Pending Prescriptions Disp Refills     atenolol (TENORMIN) 100 MG tablet  Last Written Prescription Date:  8/8/17  Last Fill Quantity: 90,  # refills: 1   Last office visit: 8/14/2017 with prescribing provider:  No   Future Office Visit:     90 tablet 1     Sig: Take 1 tablet (100 mg) by mouth daily    Beta-Blockers Protocol Failed    9/5/2018  8:40 AM       Failed - Blood pressure under 140/90 in past 12 months    BP Readings from Last 3 Encounters:   08/14/17 172/80   08/13/17 164/88   08/10/17 148/72          Failed - Recent (12 mo) or future (30 days) visit within the authorizing provider's specialty    Patient had office visit in the last 12 months or has a visit in the next 30 days with authorizing provider or within the authorizing provider's specialty.  See \"Patient Info\" tab in inbasket, or \"Choose Columns\" in Meds & Orders section of the refill encounter.           Passed - Patient is age 6 or older              losartan (COZAAR) 100 MG tablet  Last Written Prescription Date:  8/8/17  Last Fill Quantity: 90,  # refills: 3   Last office visit: 8/14/2017 with prescribing provider:  No   Future Office Visit:     90 tablet 3     Sig: Take 1 tablet (100 mg) by mouth daily    Angiotensin-II Receptors Failed    9/5/2018  8:40 AM       Failed - Blood pressure under 140/90 in past 12 months    BP Readings from Last 3 Encounters:   08/14/17 172/80   08/13/17 164/88   08/10/17 148/72          Failed - Recent (12 mo) or future (30 days) visit within the authorizing provider's specialty    Patient had office visit in the last 12 months or has a visit in the next 30 days with authorizing provider or within the authorizing provider's specialty.  See \"Patient Info\" tab in inbasket, or \"Choose Columns\" in Meds & Orders section of the refill encounter.           Failed - Normal serum creatinine on file in past 12 months    Recent Labs   Lab Test  08/13/17   1215   CR  0.66          Failed - Normal " serum potassium on file in past 12 months    Recent Labs   Lab Test  08/13/17   1215   POTASSIUM  3.5          Passed - Patient is age 18 or older        Patient is overdue for appointment. Left voice message to call clinic back.    Routing refill request to provider for review/approval because:  A break in medication  Patient needs to be seen because it has been more than 1 year since last office visit.

## 2018-09-06 DIAGNOSIS — I10 HTN (HYPERTENSION), BENIGN: ICD-10-CM

## 2018-09-06 NOTE — TELEPHONE ENCOUNTER
"Requested Prescriptions   Pending Prescriptions Disp Refills     atenolol (TENORMIN) 50 MG tablet [Pharmacy Med Name: Atenolol Oral Tablet 50 MG] 60 tablet 0    Last Written Prescription Date:  09/05/2018  Last Fill Quantity: 30,  # refills: 0   Last office visit: 8/14/2017 with prescribing provider:     Future Office Visit:   Sig: TAKE TWO TABLETS BY MOUTH DAILY    Beta-Blockers Protocol Failed    9/6/2018 11:17 AM       Failed - Blood pressure under 140/90 in past 12 months    BP Readings from Last 3 Encounters:   08/14/17 172/80   08/13/17 164/88   08/10/17 148/72                Failed - Recent (12 mo) or future (30 days) visit within the authorizing provider's specialty    Patient had office visit in the last 12 months or has a visit in the next 30 days with authorizing provider or within the authorizing provider's specialty.  See \"Patient Info\" tab in inbasket, or \"Choose Columns\" in Meds & Orders section of the refill encounter.           Passed - Patient is age 6 or older        losartan (COZAAR) 100 MG tablet [Pharmacy Med Name: Losartan Potassium Oral Tablet 100 MG] 30 tablet 2    Last Written Prescription Date:  09/05/2018  Last Fill Quantity: 30,  # refills: 0   Last office visit: 8/14/2017 with prescribing provider:     Future Office Visit:   Sig: TAKE ONE TABLET BY MOUTH ONE TIME DAILY    Angiotensin-II Receptors Failed    9/6/2018 11:17 AM       Failed - Blood pressure under 140/90 in past 12 months    BP Readings from Last 3 Encounters:   08/14/17 172/80   08/13/17 164/88   08/10/17 148/72                Failed - Recent (12 mo) or future (30 days) visit within the authorizing provider's specialty    Patient had office visit in the last 12 months or has a visit in the next 30 days with authorizing provider or within the authorizing provider's specialty.  See \"Patient Info\" tab in inbasket, or \"Choose Columns\" in Meds & Orders section of the refill encounter.           Failed - Normal serum creatinine " on file in past 12 months    Recent Labs   Lab Test  08/13/17   1215   CR  0.66            Failed - Normal serum potassium on file in past 12 months    Recent Labs   Lab Test  08/13/17   1215   POTASSIUM  3.5                   Passed - Patient is age 18 or older

## 2018-09-07 RX ORDER — LOSARTAN POTASSIUM 100 MG/1
TABLET ORAL
Qty: 30 TABLET | Refills: 2 | OUTPATIENT
Start: 2018-09-07

## 2018-09-07 RX ORDER — ATENOLOL 50 MG/1
TABLET ORAL
Qty: 60 TABLET | Refills: 0 | OUTPATIENT
Start: 2018-09-07

## 2018-10-02 ENCOUNTER — TELEPHONE (OUTPATIENT)
Dept: INTERNAL MEDICINE | Facility: CLINIC | Age: 58
End: 2018-10-02

## 2018-10-02 NOTE — LETTER
[facility]  [address], [address]  [place], [place]  [address]                                            TEL:[phone]  FAX:[phone]      Luis Yates  [address] [facility] [address]      October 11, 2018    Dear Luis,          We sent you a letter a couple of weeks ago informing you of health maintenance that is due. We hope that you received it. This letter is just a follow up to remind you to schedule an appointment.            Sincerely,      JP Barajas

## 2018-10-02 NOTE — LETTER
[facility]  [address], [address]  [place], [place]  [address]                                            TEL:[phone]  FAX:[phone]      Luis Bello [place] [facility] [address]      October 2, 2018    Dear Luis,          At [facility], we care about your health and well-being. A review of your chart has indicated that you are due for a fasting physical, a follow up on blood pressure and asthma.  Please contact us at [phone] to schedule an appointment.     If you have already had one or all of the above screening tests at another facility, please call us to update your chart.        Sincerely,      JP Barajas

## 2018-10-02 NOTE — TELEPHONE ENCOUNTER
Panel Management Review      Patient has the following on his problem list:     Asthma review   No flowsheet data found.   1. Is Asthma diagnosis on the Problem List? Yes    2. Is Asthma listed on Health Maintenance? Yes    3. Patient is due for:  ACT and AAP      IVD   ASA: FAILED    Last LDL:    Lab Results   Component Value Date    CHOL 234 05/18/2017     Lab Results   Component Value Date    HDL 43 05/18/2017     Lab Results   Component Value Date     05/18/2017     Lab Results   Component Value Date    TRIG 173 05/18/2017      No results found for: CHOLHDLRATIO     Is the patient on a Statin? NO   Is the patient on Aspirin? NO                    Last three blood pressure readings:  BP Readings from Last 3 Encounters:   08/14/17 172/80   08/13/17 164/88   08/10/17 148/72        Tobacco History:     History   Smoking Status     Never Smoker   Smokeless Tobacco     Never Used       Hypertension   Last three blood pressure readings:  BP Readings from Last 3 Encounters:   08/14/17 172/80   08/13/17 164/88   08/10/17 148/72     Blood pressure: FAILED    HTN Guidelines:  Age 18-59 BP range:  Less than 140/90  Age 60-85 with Diabetes:  Less than 140/90  Age 60-85 without Diabetes:  less than 150/90      Composite cancer screening  Chart review shows that this patient is due/due soon for the following Colonoscopy  Summary:    Patient is due/failing the following:   AAP, ACT, ASA, BP CHECK, COLONOSCOPY, FOLLOW UP, LDL and PHYSICAL    Action needed:   Patient needs office visit for as above.    Type of outreach:    Sent letter.    Questions for provider review:    ASA/statin?                                                                                                                                    JENNA Hunt LPN       Chart routed to Provider.

## 2020-03-10 ENCOUNTER — HEALTH MAINTENANCE LETTER (OUTPATIENT)
Age: 60
End: 2020-03-10

## 2020-12-27 ENCOUNTER — HEALTH MAINTENANCE LETTER (OUTPATIENT)
Age: 60
End: 2020-12-27

## 2021-04-24 ENCOUNTER — HEALTH MAINTENANCE LETTER (OUTPATIENT)
Age: 61
End: 2021-04-24

## 2021-10-04 NOTE — TELEPHONE ENCOUNTER
No protocol pt was last seen by sherif on 12/2020. Per sherif note she uses for  sleep disturbances  Overall stable  she uses lorazepam prn for mult days of disrutped sleep.    She is to return for cpe in 12/2021   Second Panel Management letter mailed.

## 2021-10-09 ENCOUNTER — HEALTH MAINTENANCE LETTER (OUTPATIENT)
Age: 61
End: 2021-10-09

## 2022-02-22 ENCOUNTER — OFFICE VISIT (OUTPATIENT)
Dept: INTERNAL MEDICINE | Facility: CLINIC | Age: 62
End: 2022-02-22
Payer: COMMERCIAL

## 2022-02-22 ENCOUNTER — ANCILLARY PROCEDURE (OUTPATIENT)
Dept: GENERAL RADIOLOGY | Facility: CLINIC | Age: 62
End: 2022-02-22
Attending: INTERNAL MEDICINE
Payer: COMMERCIAL

## 2022-02-22 VITALS
TEMPERATURE: 98.9 F | DIASTOLIC BLOOD PRESSURE: 73 MMHG | SYSTOLIC BLOOD PRESSURE: 139 MMHG | WEIGHT: 164 LBS | RESPIRATION RATE: 16 BRPM | HEART RATE: 68 BPM | OXYGEN SATURATION: 95 % | BODY MASS INDEX: 29.06 KG/M2 | HEIGHT: 63 IN

## 2022-02-22 DIAGNOSIS — Z23 NEED FOR PROPHYLACTIC VACCINATION AND INOCULATION AGAINST INFLUENZA: ICD-10-CM

## 2022-02-22 DIAGNOSIS — R30.0 DYSURIA: ICD-10-CM

## 2022-02-22 DIAGNOSIS — K59.00 CONSTIPATION, UNSPECIFIED CONSTIPATION TYPE: ICD-10-CM

## 2022-02-22 DIAGNOSIS — R07.9 CHEST PAIN, UNSPECIFIED TYPE: Primary | ICD-10-CM

## 2022-02-22 DIAGNOSIS — R06.02 SOB (SHORTNESS OF BREATH): ICD-10-CM

## 2022-02-22 DIAGNOSIS — K21.00 GASTROESOPHAGEAL REFLUX DISEASE WITH ESOPHAGITIS WITHOUT HEMORRHAGE: ICD-10-CM

## 2022-02-22 DIAGNOSIS — R07.9 CHEST PAIN, UNSPECIFIED TYPE: ICD-10-CM

## 2022-02-22 LAB
ALBUMIN UR-MCNC: NEGATIVE MG/DL
APPEARANCE UR: CLEAR
BILIRUB UR QL STRIP: NEGATIVE
COLOR UR AUTO: YELLOW
GLUCOSE UR STRIP-MCNC: NEGATIVE MG/DL
HGB UR QL STRIP: NEGATIVE
KETONES UR STRIP-MCNC: NEGATIVE MG/DL
LEUKOCYTE ESTERASE UR QL STRIP: NEGATIVE
NITRATE UR QL: NEGATIVE
PH UR STRIP: 5 [PH] (ref 5–7)
RBC #/AREA URNS AUTO: NORMAL /HPF
SP GR UR STRIP: 1.02 (ref 1–1.03)
UROBILINOGEN UR STRIP-ACNC: 0.2 E.U./DL
WBC #/AREA URNS AUTO: NORMAL /HPF

## 2022-02-22 PROCEDURE — 71046 X-RAY EXAM CHEST 2 VIEWS: CPT | Performed by: RADIOLOGY

## 2022-02-22 PROCEDURE — 90682 RIV4 VACC RECOMBINANT DNA IM: CPT | Performed by: INTERNAL MEDICINE

## 2022-02-22 PROCEDURE — 81001 URINALYSIS AUTO W/SCOPE: CPT | Performed by: INTERNAL MEDICINE

## 2022-02-22 PROCEDURE — 90471 IMMUNIZATION ADMIN: CPT | Performed by: INTERNAL MEDICINE

## 2022-02-22 PROCEDURE — 93000 ELECTROCARDIOGRAM COMPLETE: CPT | Performed by: INTERNAL MEDICINE

## 2022-02-22 PROCEDURE — 99205 OFFICE O/P NEW HI 60 MIN: CPT | Mod: 25 | Performed by: INTERNAL MEDICINE

## 2022-02-22 RX ORDER — PANTOPRAZOLE SODIUM 40 MG/1
40 TABLET, DELAYED RELEASE ORAL 2 TIMES DAILY
Qty: 180 TABLET | Refills: 1 | Status: SHIPPED | OUTPATIENT
Start: 2022-02-22 | End: 2024-05-15

## 2022-02-22 RX ORDER — POLYETHYLENE GLYCOL 3350 17 G/17G
1 POWDER, FOR SOLUTION ORAL DAILY
Qty: 850 G | Refills: 3 | Status: SHIPPED | OUTPATIENT
Start: 2022-02-22 | End: 2024-05-15

## 2022-02-22 RX ORDER — AMLODIPINE AND VALSARTAN 10; 160 MG/1; MG/1
1 TABLET ORAL DAILY
COMMUNITY
Start: 2022-02-22 | End: 2024-05-15

## 2022-02-22 RX ORDER — PANTOPRAZOLE SODIUM 40 MG/1
40 TABLET, DELAYED RELEASE ORAL DAILY
COMMUNITY
Start: 2022-02-22 | End: 2022-02-22

## 2022-02-22 RX ORDER — ATENOLOL 50 MG/1
50 TABLET ORAL AT BEDTIME
COMMUNITY
Start: 2022-02-22

## 2022-02-22 RX ORDER — DICLOFENAC POTASSIUM 50 MG/1
50 TABLET, FILM COATED ORAL 3 TIMES DAILY
COMMUNITY
Start: 2022-02-22 | End: 2024-05-15

## 2022-02-22 NOTE — PROGRESS NOTES
"  Assessment & Plan     Chest pain, unspecified type  Will check stress echo  - XR Chest 2 Views; Future  - EKG 12-lead complete w/read - Clinics    SOB (shortness of breath)  as above.   - XR Chest 2 Views; Future    Dysuria     - UA with Microscopic reflex to Culture - Clinic Collect    Constipation, unspecified constipation type   likely adding to his GERD  - polyethylene glycol (MIRALAX) 17 GM/Dose powder; Take 17 g (1 capful) by mouth daily    Gastroesophageal reflux disease with esophagitis without hemorrhage   will increase to bid for a month and see him back  - pantoprazole (PROTONIX) 40 MG EC tablet; Take 1 tablet (40 mg) by mouth 2 times daily    Need for prophylactic vaccination and inoculation against influenza     - INFLUENZA QUAD, RECOMBINANT, P-FREE (RIV4) (FLUBLOK)      60 minutes spent on the date of the encounter doing chart review, review of test results, interpretation of tests, patient visit, documentation and discussion with family        BMI:   Estimated body mass index is 29.05 kg/m  as calculated from the following:    Height as of this encounter: 1.6 m (5' 3\").    Weight as of this encounter: 74.4 kg (164 lb).         Return in about 4 weeks (around 3/22/2022).    Edwige Desai MD  [facility] FREDY Smith is a 61 year old who presents for the following health issues     HPI     Daughter in room. Interpretor # [id number].    ED/UC Followup:    Facility:  Urgent Care  Date of visit: 2-  Reason for visit: dysuria/asthma/abd pain       He does not speak english everything was through and  and his daughter suresh is with him.     For the past 3 months has been getting intermittent left sided chest pain that is pressure like. Generally happens 2-3 times a week and last about an hour. Does not necessarily happen with exertion. Can happen at restHe feels mildly short of breath with it even when at rest. He says he feels tired more often. No nausea or " "vomiting. He is a non-smoker. There is no familly history of ASCVD/CVA. He does not get exercise.      He has a long history of GERD and that has been worse recently but he thinks this is different than the above pain. Rare ETOH intake or caffeine. No carbonation. He is on Protonix and takes it regularly. Denies any fever chills or night sweats. Wt is stable. He can get burning from epigastrium to back of his throat. No dysphagia    He is chronically constipated. Strains every time he has a stool. And stool is small and hard. He takes nothing for it.     Her also has urinary frequency, urgency and burning for a couple months. Some low back pain       Review of Systems   Constitutional, HEENT, cardiovascular, pulmonary, GI, , musculoskeletal, neuro, skin, endocrine and psych systems are negative, except as otherwise noted.      Objective    /73 (BP Location: Left arm, Patient Position: Chair, Cuff Size: Adult Large)   Pulse 68   Temp 98.9  F (37.2  C) (Oral)   Resp 16   Ht 1.6 m (5' 3\")   Wt 74.4 kg (164 lb)   SpO2 95%   BMI 29.05 kg/m    Body mass index is 29.05 kg/m .  Physical Exam   GENERAL: healthy, alert and no distress  EYES: Eyes grossly normal to inspection, PERRL and conjunctivae and sclerae normal  NECK: no adenopathy, no asymmetry, masses, or scars and thyroid normal to palpation  RESP: lungs clear to auscultation - no rales, rhonchi or wheezes  CV: regular rate and rhythm, normal S1 S2, no S3 or S4, no murmur, click or rub, no peripheral edema and peripheral pulses strong  ABDOMEN: soft, nontender, no hepatosplenomegaly, no masses and bowel sounds normal  MS: no gross musculoskeletal defects noted, no edema  NEURO: Normal strength and tone, mentation intact and speech normal  PSYCH: mentation appears normal, affect normal/bright  LYMPH: no cervical, supraclavicular, axillary, or inguinal adenopathy    CHEST X-RAY: right sided scarring. no change from baseline     EKG: normal sinus rhythm "     UA: pending

## 2022-02-25 ENCOUNTER — HOSPITAL ENCOUNTER (OUTPATIENT)
Dept: CARDIOLOGY | Facility: CLINIC | Age: 62
Discharge: HOME | End: 2022-02-25
Attending: INTERNAL MEDICINE | Admitting: INTERNAL MEDICINE
Payer: COMMERCIAL

## 2022-02-25 DIAGNOSIS — R06.02 SOB (SHORTNESS OF BREATH): ICD-10-CM

## 2022-02-25 DIAGNOSIS — R07.9 CHEST PAIN, UNSPECIFIED TYPE: ICD-10-CM

## 2022-02-25 PROCEDURE — 93017 CV STRESS TEST TRACING ONLY: CPT

## 2022-02-25 PROCEDURE — 255N000002 HC RX 255 OP 636: Performed by: INTERNAL MEDICINE

## 2022-02-25 PROCEDURE — 93325 DOPPLER ECHO COLOR FLOW MAPG: CPT | Mod: 26 | Performed by: INTERNAL MEDICINE

## 2022-02-25 PROCEDURE — 93016 CV STRESS TEST SUPVJ ONLY: CPT | Performed by: INTERNAL MEDICINE

## 2022-02-25 PROCEDURE — 93350 STRESS TTE ONLY: CPT | Mod: 26 | Performed by: INTERNAL MEDICINE

## 2022-02-25 PROCEDURE — 93321 DOPPLER ECHO F-UP/LMTD STD: CPT | Mod: 26 | Performed by: INTERNAL MEDICINE

## 2022-02-25 PROCEDURE — 93325 DOPPLER ECHO COLOR FLOW MAPG: CPT | Mod: TC

## 2022-02-25 PROCEDURE — 93018 CV STRESS TEST I&R ONLY: CPT | Performed by: INTERNAL MEDICINE

## 2022-02-25 RX ADMIN — HUMAN ALBUMIN MICROSPHERES AND PERFLUTREN 3 ML: 10; .22 INJECTION, SOLUTION INTRAVENOUS at 10:06

## 2022-03-29 ENCOUNTER — OFFICE VISIT (OUTPATIENT)
Dept: INTERNAL MEDICINE | Facility: CLINIC | Age: 62
End: 2022-03-29
Payer: COMMERCIAL

## 2022-03-29 VITALS
TEMPERATURE: 97.8 F | BODY MASS INDEX: 28.53 KG/M2 | WEIGHT: 161 LBS | DIASTOLIC BLOOD PRESSURE: 90 MMHG | HEIGHT: 63 IN | OXYGEN SATURATION: 96 % | HEART RATE: 61 BPM | SYSTOLIC BLOOD PRESSURE: 160 MMHG

## 2022-03-29 DIAGNOSIS — G89.29 CHRONIC BILATERAL LOW BACK PAIN WITH RIGHT-SIDED SCIATICA: Primary | ICD-10-CM

## 2022-03-29 DIAGNOSIS — M54.41 CHRONIC BILATERAL LOW BACK PAIN WITH RIGHT-SIDED SCIATICA: Primary | ICD-10-CM

## 2022-03-29 DIAGNOSIS — N40.1 BPH WITH OBSTRUCTION/LOWER URINARY TRACT SYMPTOMS: ICD-10-CM

## 2022-03-29 DIAGNOSIS — R30.0 DYSURIA: ICD-10-CM

## 2022-03-29 DIAGNOSIS — N13.8 BPH WITH OBSTRUCTION/LOWER URINARY TRACT SYMPTOMS: ICD-10-CM

## 2022-03-29 PROBLEM — K40.90 LEFT INGUINAL HERNIA: Status: ACTIVE | Noted: 2017-04-16

## 2022-03-29 PROBLEM — M10.9 GOUT INVOLVING TOE OF RIGHT FOOT: Status: ACTIVE | Noted: 2017-03-19

## 2022-03-29 LAB
ALBUMIN UR-MCNC: NEGATIVE MG/DL
APPEARANCE UR: CLEAR
BILIRUB UR QL STRIP: NEGATIVE
COLOR UR AUTO: YELLOW
GLUCOSE UR STRIP-MCNC: NEGATIVE MG/DL
HGB UR QL STRIP: NEGATIVE
KETONES UR STRIP-MCNC: NEGATIVE MG/DL
LEUKOCYTE ESTERASE UR QL STRIP: NEGATIVE
NITRATE UR QL: NEGATIVE
PH UR STRIP: 6 [PH] (ref 5–7)
SP GR UR STRIP: 1.02 (ref 1–1.03)
UROBILINOGEN UR STRIP-ACNC: 0.2 E.U./DL

## 2022-03-29 PROCEDURE — 81003 URINALYSIS AUTO W/O SCOPE: CPT | Performed by: INTERNAL MEDICINE

## 2022-03-29 PROCEDURE — 99214 OFFICE O/P EST MOD 30 MIN: CPT | Performed by: INTERNAL MEDICINE

## 2022-03-29 RX ORDER — ALBUTEROL SULFATE 90 UG/1
1-2 AEROSOL, METERED RESPIRATORY (INHALATION)
COMMUNITY
Start: 2022-02-19 | End: 2024-05-15

## 2022-03-29 RX ORDER — TAMSULOSIN HYDROCHLORIDE 0.4 MG/1
0.4 CAPSULE ORAL DAILY
Qty: 30 CAPSULE | Refills: 2 | Status: SHIPPED | OUTPATIENT
Start: 2022-03-29 | End: 2024-05-15

## 2022-03-29 NOTE — PATIENT INSTRUCTIONS
"*  Review the information below.     *  Return to see your usual primary MD in 4-6 weeks to see how your symptoms are doing.  Further evaluation can be done at that time if your symptoms indicate.     *  Continue all other medications at the same doses.        BENIGN PROSTATIC HYPERTROPHY (ENLARGED PROSTATE):       *  Try the over the counter herbal supplement Saw Palmetto to see if this helps reduce prostate related symptoms.     * The decision for prescription medications for the prostate are totally based on the degree of symptoms, such as how many trips tot he bathroom at night, the degree of urgency to urinate, and the degree of hesitancy (difficulties in starting and stopping the stream of urine).      *  Minimizing prostate related symptoms include:       --Minimize intake of caffeine and alcohol (especially in the evenings)   --Avoid antihistames (Benadryl) because they can affect the bladder muscles.    --Careful fluid management close to bedtime, try to minimize fluid within 2-3 hours of sleep   --Do not ignore the urge to urinate, do not \"hold \" the urine as this can cause bladder injury    *  If you continue to have troublesome symptoms despite these conservative measures, then you may require a prescription medication.  Sometimes, even prescription medications are not enough and patients may require surgery.     *  Trial of the medication Tamsulosin (generic Flomax) 0.4 mg once per day (usually in the evening) to help relax the bladder and prostate to allow you to pass urine easier which should help reduce any urgency to urinate and reduce night time trips to the bathroom.  The main side effects are dizziness, low blood pressure.             LUMBAR STRAIN/SPASM:     *  based on your history, No evidence for herniated disc, spinal stenosis, or vertebral fracture.    *  take over the counter Motrin/Ibuprofen/Advil or Aleve to help relieve pain and inflammation.  follow the directions on the bottle.  Be " "sure to take with a small meal to prevent stomach upset.      --Motrin 600 mg ( 3 x 200 mg tablets) three times per day every day for 5-7 days, then 2-3 timers per day as needed (take with food to avoid stomach side effects)     OR     --Aleve 2 tablets twice per day for 5-7 days every day, then twice per day as needed     *  do not go out of your way for activity, however, do not avoid basic activates and gentle activity.  Do not lay on the sofa, do not go on bed rest.        *  moist heat as needed like a micorwavable bean bag.  Apply the heat for 10-15 minutes a few times per day as needed.  I would avoid any sort of electrical heating pad out of fear of causing skin burns.  If you do use an electric heating pad, do not use a heating pad for longer than 15 minutes to lower the risk of burns.    *  avoid any lifting for the next 1 week, then a slow return to activity.  Remember to always use proper lifting technique, always bending at the knees rather than the waist.  Your thigh muscles are MUCH stronger than the smaller muscle of your lower back.    *  Physical therapy through [facility] (in [facility] and many other locations throughout the [facility]) .    *  expect your back to be more easily re-aggravated over the next few months.  the soft tissue and muscles are going to be more easily re-irritated over the next several weeks so be careful to return to physical action slowly.    Look for back execises on Econotherm.  (search \"low back pain rehabilitation exercises\"):  Here are some examples:    --[email or web address]     --[email or web address]             "

## 2022-03-29 NOTE — PROGRESS NOTES
Assessment & Plan     (M54.41,  G89.29) Chronic bilateral low back pain with right-sided sciatica  (primary encounter diagnosis)  Comment: Discussed typical mechanical back pain, typical causes, and atypical back pain, including red flag symptoms.  Discussed conservative tratments inclduing physical therpy, stretching and strengthening, use of heat and/or ice, NSAIDs with food, antispasmodics where indicated, and the use of narcotic pain meds where indicated.    According to the previous notes from 2017, the patient reported relief of his symptoms with physical therapy when he was in [facility].  He vaguely remembers that.  At this time there are none of symptoms to warrant an MRI or further imaging at this time.  Patient preferred to start with conservative therapies first should he fail to improve then further investigation with MRI would be indicated.  Follow-up with his primary care clinic if he does not improve.  Plan: Physical Therapy Referral            (N40.1,  N13.8) BPH with obstruction/lower urinary tract symptoms  Comment: Urinalysis negative x3 in the last 6 weeks.  No evidence for active kidney disease or infection as a cause for his symptoms.  Given his age it sounds like a prostate related issue.  Trial of tamsulosin 0.4 mg once daily.  Reviewed side effects.  If no improvement, follow-up with primary physician.  Consider referral for urology.  Plan: tamsulosin (FLOMAX) 0.4 MG capsule            (R30.0) Dysuria  Comment:   Plan: UA macro with reflex to Microscopic and Culture        - Clinc Collect                            Return in about 6 weeks (around 5/10/2022) for in person Office visit, With your primary MD.    William Moon MD  [facility]    Harriett Smith is a 61 year old who presents for the following health issues     Here with daughter.   The patient does not speak English as their primary Language.  Today's visit was assisted via an Hungarian  phone interpretor.       History of Present Illness       Back Pain:  He presents for follow up of back pain. Patient's back pain is a recurring problem.  Location of back pain:  Right lower back, left lower back, right middle of back and left middle of back  Description of back pain: burning and cramping  Back pain spreads: right knee, left knee, right shoulder and left shoulder    Since patient first noticed back pain, pain is: rapidly worsening  Does back pain interfere with his job:  Yes      He eats 2-3 servings of fruits and vegetables daily.He consumes 0 sweetened beverage(s) daily.He exercises with enough effort to increase his heart rate 20 to 29 minutes per day.  He exercises with enough effort to increase his heart rate 5 days per week.   He is taking medications regularly.     Patient also  Points to upper mid back and neck area  Difficulty with certain movements.    He reports symptoms are better when he walks, worse when he lays down for long periods of time.    Also painful urination-burning and frequency.  No hematuria, no posterior flank pain.  Nocturia x1.  Overall urinary stream diminished from many years ago.  Mild occasional urgency and mild hesitancy    Reviewed urgent care notes from mid February and clinic notes from last month.  Urinalysis negative x2 in the past 6 weeks for these exact same symptoms.    Denies hematuria, denies fevers, denies pain.  Denies radicular symptoms, denies bowel or bladder incontinence.    Similar symptoms evaluated in 2017 with a lumbar x-ray showing some degenerative changes.          **I reviewed the information recorded in the patient's EPIC chart (including but not limited to medical history, surgical history, family history, problem list, medication list, and allergy list) and updated the information as indicated based on the patients reported information.         Review of Systems   Constitutional, HEENT, cardiovascular, pulmonary, gi and gu systems are  "negative, except as otherwise noted.      Objective    BP (!) 160/90   Pulse 61   Temp 97.8  F (36.6  C) (Tympanic)   Ht 1.6 m (5' 3\")   Wt 73 kg (161 lb)   SpO2 96%   BMI 28.52 kg/m    Body mass index is 28.52 kg/m .  Physical Exam   GENERAL alert and no distress  EYES:  Normal sclera,conjunctiva, EOMI  HENT: oral and posterior pharynx without lesions or erythema, facies symmetric  NECK: Neck supple. No LAD, without thyroidmegaly.  RESP: Clear to ausculation bilaterally without wheezes or crackles. Normal BS in all fields.  CV: RRR normal S1S2 without murmurs, rubs or gallops.  LYMPH: no cervical lymph adenopathy appreciated  MS: extremities- no gross deformities of the visible extremities noted,   EXT:  no lower extremity edema  PSYCH: Alert and oriented times 3; speech- coherent  SKIN:  No obvious significant skin lesions on visible portions of face   BACK:  Pt appears uncomfortable, but not in severe distress.    He is able to forward flex the lower back almost down to touch his toes without great difficulty.  Pain to palpation of paraspinal lumbar muscles, muscles in spasm, palpation reproduces pain exactly. straight leg-raises negative for radicular symptoms bilaterally, however he he has very tight hamstrings particular right side.  Able to move on and off the exam table easily, no obsevred weakness in the feet or legs with walking, standing, or ambulation. Normal reflexes in the achilles and patellar tendons.       Results for orders placed or performed in visit on 03/29/22   UA macro with reflex to Microscopic and Culture - Clinc Collect     Status: Normal    Specimen: Urine, Clean Catch   Result Value Ref Range    Color Urine Yellow Colorless, Straw, Light Yellow, Yellow    Appearance Urine Clear Clear    Glucose Urine Negative Negative mg/dL    Bilirubin Urine Negative Negative    Ketones Urine Negative Negative mg/dL    Specific Gravity Urine 1.025 1.003 - 1.035    Blood Urine Negative Negative    " pH Urine 6.0 5.0 - 7.0    Protein Albumin Urine Negative Negative mg/dL    Urobilinogen Urine 0.2 0.2, 1.0 E.U./dL    Nitrite Urine Negative Negative    Leukocyte Esterase Urine Negative Negative    Narrative    Microscopic not indicated

## 2022-05-21 ENCOUNTER — HEALTH MAINTENANCE LETTER (OUTPATIENT)
Age: 62
End: 2022-05-21

## 2022-09-11 ENCOUNTER — HEALTH MAINTENANCE LETTER (OUTPATIENT)
Age: 62
End: 2022-09-11

## 2023-06-03 ENCOUNTER — HEALTH MAINTENANCE LETTER (OUTPATIENT)
Age: 63
End: 2023-06-03

## 2023-07-29 ENCOUNTER — HEALTH MAINTENANCE LETTER (OUTPATIENT)
Age: 63
End: 2023-07-29

## 2024-05-15 ENCOUNTER — OFFICE VISIT (OUTPATIENT)
Dept: FAMILY MEDICINE | Facility: CLINIC | Age: 64
End: 2024-05-15
Payer: COMMERCIAL

## 2024-05-15 ENCOUNTER — ANCILLARY PROCEDURE (OUTPATIENT)
Dept: GENERAL RADIOLOGY | Facility: CLINIC | Age: 64
End: 2024-05-15
Attending: FAMILY MEDICINE
Payer: COMMERCIAL

## 2024-05-15 ENCOUNTER — ORDERS ONLY (AUTO-RELEASED) (OUTPATIENT)
Dept: FAMILY MEDICINE | Facility: CLINIC | Age: 64
End: 2024-05-15

## 2024-05-15 VITALS
BODY MASS INDEX: 28.21 KG/M2 | HEIGHT: 63 IN | HEART RATE: 65 BPM | WEIGHT: 159.2 LBS | RESPIRATION RATE: 10 BRPM | TEMPERATURE: 97.3 F | DIASTOLIC BLOOD PRESSURE: 66 MMHG | OXYGEN SATURATION: 96 % | SYSTOLIC BLOOD PRESSURE: 130 MMHG

## 2024-05-15 DIAGNOSIS — J45.22 MILD INTERMITTENT ASTHMA WITH STATUS ASTHMATICUS: ICD-10-CM

## 2024-05-15 DIAGNOSIS — Z12.11 SCREEN FOR COLON CANCER: ICD-10-CM

## 2024-05-15 DIAGNOSIS — Z11.59 NEED FOR HEPATITIS C SCREENING TEST: ICD-10-CM

## 2024-05-15 DIAGNOSIS — R35.0 BENIGN PROSTATIC HYPERPLASIA WITH URINARY FREQUENCY: ICD-10-CM

## 2024-05-15 DIAGNOSIS — N40.1 BENIGN PROSTATIC HYPERPLASIA WITH URINARY FREQUENCY: ICD-10-CM

## 2024-05-15 DIAGNOSIS — I10 HTN (HYPERTENSION), BENIGN: ICD-10-CM

## 2024-05-15 DIAGNOSIS — Z11.4 SCREENING FOR HIV (HUMAN IMMUNODEFICIENCY VIRUS): ICD-10-CM

## 2024-05-15 DIAGNOSIS — K80.20 CALCULUS OF GALLBLADDER WITHOUT CHOLECYSTITIS WITHOUT OBSTRUCTION: ICD-10-CM

## 2024-05-15 DIAGNOSIS — Z00.00 ROUTINE GENERAL MEDICAL EXAMINATION AT A HEALTH CARE FACILITY: Primary | ICD-10-CM

## 2024-05-15 PROCEDURE — 87389 HIV-1 AG W/HIV-1&-2 AB AG IA: CPT | Performed by: FAMILY MEDICINE

## 2024-05-15 PROCEDURE — 80061 LIPID PANEL: CPT | Performed by: FAMILY MEDICINE

## 2024-05-15 PROCEDURE — 86803 HEPATITIS C AB TEST: CPT | Performed by: FAMILY MEDICINE

## 2024-05-15 PROCEDURE — 99397 PER PM REEVAL EST PAT 65+ YR: CPT | Performed by: FAMILY MEDICINE

## 2024-05-15 PROCEDURE — 36415 COLL VENOUS BLD VENIPUNCTURE: CPT | Performed by: FAMILY MEDICINE

## 2024-05-15 PROCEDURE — 71046 X-RAY EXAM CHEST 2 VIEWS: CPT | Mod: TC | Performed by: RADIOLOGY

## 2024-05-15 PROCEDURE — 80048 BASIC METABOLIC PNL TOTAL CA: CPT | Performed by: FAMILY MEDICINE

## 2024-05-15 PROCEDURE — 99214 OFFICE O/P EST MOD 30 MIN: CPT | Mod: 25 | Performed by: FAMILY MEDICINE

## 2024-05-15 RX ORDER — ALBUTEROL SULFATE 90 UG/1
2 AEROSOL, METERED RESPIRATORY (INHALATION) EVERY 6 HOURS PRN
Qty: 18 G | Refills: 2 | Status: SHIPPED | OUTPATIENT
Start: 2024-05-15

## 2024-05-15 RX ORDER — AMLODIPINE AND VALSARTAN 10; 160 MG/1; MG/1
1 TABLET ORAL DAILY
Status: ON HOLD | COMMUNITY
Start: 2024-05-15 | End: 2024-06-06

## 2024-05-15 SDOH — HEALTH STABILITY: PHYSICAL HEALTH: ON AVERAGE, HOW MANY DAYS PER WEEK DO YOU ENGAGE IN MODERATE TO STRENUOUS EXERCISE (LIKE A BRISK WALK)?: 7 DAYS

## 2024-05-15 ASSESSMENT — LIFESTYLE VARIABLES
SKIP TO QUESTIONS 9-10: 1
AUDIT-C TOTAL SCORE: 0
HOW OFTEN DO YOU HAVE SIX OR MORE DRINKS ON ONE OCCASION: NEVER
HOW OFTEN DO YOU HAVE A DRINK CONTAINING ALCOHOL: NEVER
HOW MANY STANDARD DRINKS CONTAINING ALCOHOL DO YOU HAVE ON A TYPICAL DAY: PATIENT DOES NOT DRINK

## 2024-05-15 ASSESSMENT — SOCIAL DETERMINANTS OF HEALTH (SDOH)
HOW OFTEN DO YOU ATTENT MEETINGS OF THE CLUB OR ORGANIZATION YOU BELONG TO?: 1 TO 4 TIMES PER YEAR
HOW OFTEN DO YOU GET TOGETHER WITH FRIENDS OR RELATIVES?: ONCE A WEEK
HOW OFTEN DO YOU ATTEND CHURCH OR RELIGIOUS SERVICES?: 1 TO 4 TIMES PER YEAR
IN A TYPICAL WEEK, HOW MANY TIMES DO YOU TALK ON THE PHONE WITH FAMILY, FRIENDS, OR NEIGHBORS?: MORE THAN THREE TIMES A WEEK
DO YOU BELONG TO ANY CLUBS OR ORGANIZATIONS SUCH AS CHURCH GROUPS UNIONS, FRATERNAL OR ATHLETIC GROUPS, OR SCHOOL GROUPS?: NO
HOW OFTEN DO YOU GET TOGETHER WITH FRIENDS OR RELATIVES?: MORE THAN THREE TIMES A WEEK

## 2024-05-15 ASSESSMENT — ASTHMA QUESTIONNAIRES
QUESTION_5 LAST FOUR WEEKS HOW WOULD YOU RATE YOUR ASTHMA CONTROL: POORLY CONTROLLED
ACT_TOTALSCORE: 11
QUESTION_4 LAST FOUR WEEKS HOW OFTEN HAVE YOU USED YOUR RESCUE INHALER OR NEBULIZER MEDICATION (SUCH AS ALBUTEROL): TWO OR THREE TIMES PER WEEK
QUESTION_1 LAST FOUR WEEKS HOW MUCH OF THE TIME DID YOUR ASTHMA KEEP YOU FROM GETTING AS MUCH DONE AT WORK, SCHOOL OR AT HOME: MOST OF THE TIME
ACT_TOTALSCORE: 11
QUESTION_3 LAST FOUR WEEKS HOW OFTEN DID YOUR ASTHMA SYMPTOMS (WHEEZING, COUGHING, SHORTNESS OF BREATH, CHEST TIGHTNESS OR PAIN) WAKE YOU UP AT NIGHT OR EARLIER THAN USUAL IN THE MORNING: TWO OR THREE NIGHTS A WEEK
EMERGENCY_ROOM_LAST_YEAR_TOTAL: THREE OR MORE
HOSPITALIZATION_OVERNIGHT_LAST_YEAR_TOTAL: TWO
QUESTION_2 LAST FOUR WEEKS HOW OFTEN HAVE YOU HAD SHORTNESS OF BREATH: ONCE A DAY

## 2024-05-15 ASSESSMENT — ACTIVITIES OF DAILY LIVING (ADL): CURRENT_FUNCTION: NO ASSISTANCE NEEDED

## 2024-05-15 NOTE — COMMUNITY RESOURCES LIST (ENGLISH)
May 15, 2024           YOUR PERSONALIZED LIST OF SERVICES & PROGRAMS           & SHELTER    Housing      [facility] - Shelter for individuals  [address] [place] [address] (Distance: 9.7 miles)  Language: English, Central African  Fee: Free      [facility] - [facility]  [address] [place] [address] (Distance: 9.6 miles)  Phone: [phone]  Website: [email or web address]://www.[facility]  Language: English  Fee: Free, Sliding scale  Accessibility: Ada accessible      [facility]  Phone: [phone]  Website: [email or web address]/  Language: English, Hmong, Oromo, Macanese, Central African  Hours: Mon 9:00 AM - 5:00 PM Tue 9:00 AM - 5:00 PM Wed 9:00 AM - 5:00 PM Thu 9:00 AM - 5:00 PM Fri 9:00 AM - 5:00 PM  Fee: Insurance  Accessibility: Blind accommodation, Deaf or hard of hearing, Translation services  Transportation Options: Free transportation    Case Management      [facility] search assistance  [address] [place] [address] (Distance: 13.6 miles)  Phone: [phone]  Language: English  Fee: Free, Sliding scale, Insurance  Accessibility: Translation services, Ada accessible, Blind accommodation, Deaf or hard of hearing      [facility] - Housing search assistance  [address] [place] [address] (Distance: 2.2 miles)  Phone: [phone]  Language: English  Fee: Sliding scale, Self pay      [facility] - Housing Stabilization Services  Phone: [phone]  Website: https://homebasemn.com/  Language: English  Hours: Mon 8:00 AM - 4:00 PM Tue 8:00 AM - 4:00 PM Wed 8:00 AM - 4:00 PM Thu 8:00 AM - 4:00 PM Fri 8:00 AM - 4:00 PM  Fee: Free  Accessibility: Blind accommodation, Deaf or hard of hearing  Transportation Options: Free transportation    Drop-In Services      [facility]  [address] [place] [address]  [place] [address] (Distance: 17.7 miles)  Phone: [phone]  Language: English  Fee: Free      [facility] - Elder Independent Living Program  [address] [place] [place] [address] (Distance: 9.8 miles)  Website: [email or web address]://[email or web address].info/elders  Language: English      [facility] LOVE  Website: http://www.laundrylove.org               IMPORTANT NUMBERS & WEBSITES        Emergency Services  911  .   [employer]  211 http://211unitedway.org  .   Poison Control  (558) 126-3237 http://[place]poison.org http://[place]poison.org  .     Suicide and Crisis Lifeline  988 http://988SafeLogicline.org  .   Childhelp [place] Child Abuse Hotline  738.295.7275 http://Childhelphotline.org   .   [place] Sexual Assault Hotline  (235) 409-3116 (HOPE) http://Andigilog.org   .     [place] Runaway Safeline  (234) 438-1419 (RUNAWAY) http://Pyreos.CardFlight  .   Pregnancy & Postpartum Support  Call/text 560-319-7669  [place]: http://ppsupportmn.org  [place]: http://SezWho.com/wi  .   Substance Abuse National Helpline ([facility])  884-994-HELP (4048) http://Findtreatment.gov   .                DISCLAIMER: These resources have been generated via the Guidance Software Platform. Guidance Software does not endorse any service providers mentioned in this resource list. Guidance Software does not guarantee that the services mentioned in this resource list will be available to you or will improve your health or wellness.    [facility]

## 2024-05-15 NOTE — ASSESSMENT & PLAN NOTE
Pt lives in [place], and he comes her once a year.  His asthma is not controlled, and he doesn't use any medication, he feels pressure in the left side of the chest, and symptoms are worse at night, pt did have a stress test done in 2022 that was normal (related to the chest pain that he has been having).

## 2024-05-15 NOTE — COMMUNITY RESOURCES LIST (PATIENT PREFERRED LANGUAGE)
May 15, 2024           ????? ??? ???? ???????? ?? ?????? ????           ?? ????    ???? ?? ???? ??      [facility] - Shelter for individuals  [address] [place] [address] (???: 9.7 ???)  ???: ??????, ????  ???: ???      [facility] - [facility]  [address] [place] [address] (???: 9.6 ???)  ???: [phone]  ?????: [email or web address]://[email or web address].org  ???: ??????  ???: ??? ?????? ???  ??????: ?? ????      [facility] - [facility]  ???: [phone]  ?????: [email or web address]://www.[email or web address] Software/  ???: ??????, ???, ??????, ????, ????  ????: ?? 9:00 AM - 5:00 PM ??? 9:00 AM - 5:00 PM ??? 9:00 AM - 5:00 PM ??? 9:00 AM - 5:00 PM ??? 9:00 AM - 5:00 PM  ???: ??????  ??????: ??? ?????? ????? ???? ?????? ??? ????? ??? ?????? ????? ???????  ????? ?????: ?? ????    ???? ??? ??????      [facility] search assistance  [address] [place] [address] (???: 13.6 ???)  ???: [phone]  ???: ??????  ???: ??? ?????? ???? ??????  ??????: ????? ???????? ?? ????? ??? ?????? ????? ???? ?????? ??? ????? ??? ?????      [facility] - Housing search assistance  [address] [place] [address] (???: 2.2 ???)  ???: [phone]  ???: ??????  ???: ?????? ??? ? ??? ???      [facility] - Housing Stabilization Services  ???: [phone]  ?????: [email or web address]://[email or web address] Health/  ???: ??????  ????: ?? 8:00 AM - 4:00 PM ??? 8:00 AM - 4:00 PM ??? 8:00 AM - 4:00 PM ??? 8:00 AM - 4:00 PM ??? 8:00 AM - 4:00 PM  ???: ???  ??????: ??? ??? ????? ???? ?????? ??? ????? ??? ?????  ????? ?????: ?? ????    ?? ??? ?????? ???????      [facility] - [facility]  [facility] [address] [place] [address] (???: 17.7 ???)  ???: [phone]  ???: ??????  ???: ???      [facility] [place] - [facility] Independent Living Program  [address] [place]  [address] (???: 9.8 ???)  ?????: [email or web address]://[email or web address].info/[email or web address]  ???: ??????      [facility] - LYLE LOVE  ?????: http://www.[email or web address].org               ????? ???? ?? ??????        ???? ?? ???????  911  .   ????? ????  211 http://211unitedway.org  .   ???? ??????  [phone] [phone] http://[place]poison.org http://wisconsinpoison.org  .     ??? ???? ?? ??? ????? ????  980 http://988[facility].org  .   ????? ???? ???? ????? ??? ???? ????  [phone] http://Childhelphotline.org   .   ???? ???? ??? ???? ????  [phone] [phone] (???) http://Rainn.org   .     ???? ???? ?????  [phone] [phone] (RUNAWAY) http://[facility].org  .   ????? ?? ??? ??? ???  ????/??? [phone] [place]? http://TopFloorupportmn.org [place]? http://psichapters.com/wi  .   ??? ????? ???? ???? ????? ???? ([facility])  [phone]-??? (3363) http://Findtreatment.gov   .                ????? ??? ???? ???? ?Unite Us Platform ??? ????? ???? ?????? ??? ???? ???? ??? ??????? ?????? ?????? ??????? ??????? ????? ??? ???? ??? ???? ??? ?????? ??????? ????? ?????? ??? ???? ??? ???? ???????? ???? ??????    UTC

## 2024-05-15 NOTE — COMMUNITY RESOURCES LIST (PATIENT PREFERRED LANGUAGE)
May 15, 2024           ????? ??? ???? ???????? ?? ?????? ????           ?? ????    ???? ?? ???? ??      [facility] for [facility]  [address] [place] [address] (???: 9.7 ???)  ???: ??????, ????  ???: ???      [facility] Shelter - [facility]  [address] [place] [address] (???: 9.6 ???)  ???: [phone]  ?????: [email or web address]  ???: ??????  ???: ??? ?????? ???  ??????: ?? ????      [facility] - [facility]  ???: [phone]  ?????: [email or web address]  ???: ??????, ???, ??????, ????, ????  ????: ?? 9:00 AM - 5:00 PM ??? 9:00 AM - 5:00 PM ??? 9:00 AM - 5:00 PM ??? 9:00 AM - 5:00 PM ??? 9:00 AM - 5:00 PM  ???: ??????  ??????: ??? ?????? ????? ???? ?????? ??? ????? ??? ?????? ????? ???????  ????? ?????: ?? ????    ???? ??? ??????      [facility] search assistance  [address] [place] [address] (???: 13.6 ???)  ???: [phone]  ???: ??????  ???: ??? ?????? ???? ??????  ??????: ????? ???????? ?? ????? ??? ?????? ????? ???? ?????? ??? ????? ??? ?????      [facility] - Housing search assistance  [address] [place] [address] (???: 2.2 ???)  ???: [phone]  ???: ??????  ???: ?????? ??? ? ??? ???      [facility] - Housing Stabilization Services  ???: [phone]  ?????: [email or web address]://[email or web address]/  ???: ??????  ????: ?? 8:00 AM - 4:00 PM ??? 8:00 AM - 4:00 PM ??? 8:00 AM - 4:00 PM ??? 8:00 AM - 4:00 PM ??? 8:00 AM - 4:00 PM  ???: ???  ??????: ??? ??? ????? ???? ?????? ??? ????? ??? ?????  ????? ?????: ?? ????    ?? ??? ?????? ???????      Incorporated - [employer]  [facility] [address] [place] [address] (???: 17.7 ???)  ???: [phone]  ???: ??????  ???: ???      [facility] [place] [facility] Independent Living Program  [address] [place]  [address] (???: 9.8 ???)  ?????: [email or web address]://[email or web address]/[email or web address]  ???: ??????      LOVE - LYLE LOVE  ?????: http://[email or web address]               ????? ???? ?? ??????        ???? ?? ???????  911  .   ????? ????  211 http://[email or web address].org  .   ???? ??????  [phone] http://mnpoison.org http://wisconsinpoison.org  .     ??? ???? ?? ??? ????? ????  985 http://988[facility].org  .   ????? ???? ???? ????? ??? ???? ????  [phone] http://Childhelphotline.org   .   ???? ???? ??? ???? ????  [phone] (???) http://Rainn.org   .     ???? ???? ?????  [phone] (RUNAWAY) http://[facility].org  .   ????? ?? ??? ??? ???  ????/??? [phone] [place]? http://Farmanupportmn.org [place]? http://psichapters.com/wi  .   ??? ????? ???? ???? ????? ???? (SAMHSA)  [phone]-??? (2278) http://Findtreatment.gov   .                ????? ??? ???? ???? ?[employer] Platform ??? ????? ???? ?????? ??? ???? ???? ??? ??????? ?????? ?????? ??????? ??????? ????? ??? ???? ???? ??? ?????? ??????? ????? ?????? ??? ???? ??? ???? ???????? ???? ??????    UTC

## 2024-05-15 NOTE — COMMUNITY RESOURCES LIST (ENGLISH)
May 15, 2024           YOUR PERSONALIZED LIST OF SERVICES & PROGRAMS           & SHELTER    Housing      Action Partnership (CAP) of [facility] - Shelter for individuals  [address] [place] [address] (Distance: 9.7 miles)  Language: English, Trinidadian  Fee: Free      [facility] Shelter - [facility]  [address] [facility] [place] [address] (Distance: 9.6 miles)  Phone: [phone]  Website: http://www.[employer]  Language: English  Fee: Free, Sliding scale  Accessibility: Ada accessible      [facility] - [facility]  Phone: [phone]  Website: https://www.[employer]/  Language: English, Hmong, Oromo, Spanish, Trinidadian  Hours: Mon 9:00 AM - 5:00 PM Tue 9:00 AM - 5:00 PM Wed 9:00 AM - 5:00 PM Thu 9:00 AM - 5:00 PM Fri 9:00 AM - 5:00 PM  Fee: Insurance  Accessibility: Blind accommodation, Deaf or hard of hearing, Translation services  Transportation Options: Free transportation    Case Management      [facility] search assistance  [address] [place] [address] (Distance: 13.6 miles)  Phone: [phone]  Language: English  Fee: Free, Sliding scale, Insurance  Accessibility: Translation services, Ada accessible, Blind accommodation, Deaf or hard of hearing      [facility] - Housing search assistance  [address] [facility] [address] [place] [address] (Distance: 2.2 miles)  Phone: [phone]  Language: English  Fee: Sliding scale, Self pay      [facility] - Housing Stabilization Services  Phone: [phone]  Website: https://homebasemn.com/  Language: English  Hours: Mon 8:00 AM - 4:00 PM Tue 8:00 AM - 4:00 PM Wed 8:00 AM - 4:00 PM Thu 8:00 AM - 4:00 PM Fri 8:00 AM - 4:00 PM  Fee: Free  Accessibility: Blind accommodation, Deaf or hard of hearing  Transportation Options: Free transportation    Drop-In Services      Incorporated - [employer]  [address] [place] [address]  [place] [address] (Distance: 17.7 miles)  Phone: [phone]  Language: English  Fee: Free      [facility] - Elder Independent Living Program  [address] [address] [place] [address] (Distance: 9.8 miles)  Website: https://www.[facility].info/elders  Language: English      LOVE - LAUNDRY LOVE  Website: http://www.laundrylove.org               IMPORTANT NUMBERS & WEBSITES        Emergency Services  911  .   [employer] Way  211 http://211unitedway.org  .   Poison Control  (407) 879-2595 http://[place]poison.org http://wisconsinpoison.org  .     Suicide and Crisis Lifeline  988 http://988GoGuideline.org  .   Childhelp [place] Child Abuse Hotline  545.135.2349 http://Childhelphotline.org   .   [place] Sexual Assault Hotline  (553) 749-8122 (HOPE) http://ePod Solar.org   .     [place] Runaway Safeline  (758) 736-2942 (RUNAWAY) http://Finderly.[employer] Solutions  .   Pregnancy & Postpartum Support  Call/text [phone]  [place]: http://ppsupportmn.org  [place]: http://PillGuard.com/wi  .   Substance Abuse National Helpline ([facility])  708-647-HELP (0214) http://Findtreatment.gov   .                DISCLAIMER: These resources have been generated via the [employer] Platform. [employer] does not endorse any service providers mentioned in this resource list. [employer] does not guarantee that the services mentioned in this resource list will be available to you or will improve your health or wellness.    [facility]

## 2024-05-15 NOTE — PROGRESS NOTES
"Preventive Care Visit  [facility]  Griffin Marquis MD, Family Medicine  May 15, 2024      Assessment & Plan   Problem List Items Addressed This Visit       HTN (hypertension), benign     Controlled, pt to continue Atenolol 50 mg amlodipine 10 mg and Valsartan 160 mg.           Relevant Medications    amLODIPine-valsartan (EXFORGE)  MG tablet    Asthma, mild intermittent     Pt lives in [place] [facility], and he comes her once a year.  His asthma is not controlled, and he doesn't use any medication, he feels pressure in the left side of the chest, and symptoms are worse at night, pt did have a stress test done in 2022 that was normal (related to the chest pain that he has been having).           Relevant Medications    fluticasone (ARNUITY ELLIPTA) 100 MCG/ACT inhaler    albuterol (PROAIR HFA/PROVENTIL HFA/VENTOLIN HFA) 108 (90 Base) MCG/ACT inhaler    Other Relevant Orders    XR Chest 2 Views    Calculus of gallbladder without cholecystitis without obstruction     Diagnosed accidentally while having US of the abdomen in [place].          Benign prostatic hyperplasia with urinary frequency     S/P TURP in 2023, he has mild burning pain after he urinates.            Other Visit Diagnoses       Routine general medical examination at a health care facility    -  Primary    Relevant Orders    Glucose    Basic metabolic panel  (Ca, Cl, CO2, Creat, Gluc, K, Na, BUN)    Screen for colon cancer        Relevant Orders    COLOGUARD(EXACT SCIENCES)    Screening for HIV (human immunodeficiency virus)        Relevant Orders    HIV Antigen Antibody Combo    Need for hepatitis C screening test        Relevant Orders    Hepatitis C Screen Reflex to HCV RNA Quant and Genotype                    BMI  Estimated body mass index is 28.2 kg/m  as calculated from the following:    Height as of this encounter: 1.6 m (5' 3\").    Weight as of this encounter: 72.2 kg (159 lb 3.2 oz).       Counseling  Appropriate " "preventive services were discussed with this patient, including applicable screening as appropriate for fall prevention, nutrition, physical activity, Tobacco-use cessation, weight loss and cognition.  Checklist reviewing preventive services available has been given to the patient.  Reviewed patient's diet, addressing concerns and/or questions.   The patient was instructed to see the dentist every 6 months.   Discussed possible causes of fatigue. Information on urinary incontinence and treatment options given to patient.           Harriett Smith is a 63 year old, presenting for the following:  Medicare Visit        5/15/2024     3:23 PM   Additional Questions   Roomed by AT   Accompanied by daughter         5/15/2024     3:27 PM   Patient Reported Additional Medications   Patient reports taking the following new medications pandev 20mg, cimbisar 10mg/160mg, peptica gel, tramadol 50mg        Health Care Directive  Patient does not have a Health Care Directive or Living Will: Discussed advance care planning with patient; however, patient declined at this time.    Healthy Habits:     In general, how would you rate your overall health?  Good    Frequency of exercise:  6-7 days/week    Duration of exercise:  Greater than 60 minutes    Do you usually eat at least 4 servings of fruit and vegetables a day, include whole grains    & fiber and avoid regularly eating high fat or \"junk\" foods?  No    Taking medications regularly:  Yes    Barriers to taking medications:  None    Medication side effects:  None    Ability to successfully perform activities of daily living:  No assistance needed    Home Safety:  No safety concerns identified    Hearing Impairment:  No hearing concerns    In the past 6 months, have you been bothered by leaking of urine? Yes    In general, how would you rate your overall mental or emotional health?  Very good    Additional concerns today:  Yes        Hypertension Follow-up    Do you check your " blood pressure regularly outside of the clinic? No   Are you following a low salt diet? Yes  Are your blood pressures ever more than 140 on the top number (systolic) OR more   than 90 on the bottom number (diastolic), for example 140/90? No    Asthma Follow-Up    Was ACT completed today?  Yes        5/15/2024     3:34 PM   ACT Total Scores   ACT TOTAL SCORE (Goal Greater than or Equal to 20) 11   In the past 12 months, how many times did you visit the emergency room for your asthma without being admitted to the hospital? 3   In the past 12 months, how many times were you hospitalized overnight because of your asthma? 2       How many days per week do you miss taking your asthma controller medication?  I do not have an asthma controller medication  Please describe any recent triggers for your asthma: not asked.  Have you had any Emergency Room Visits, Urgent Care Visits, or Hospital Admissions since your last office visit?  No        5/15/2024   General Health   How would you rate your overall physical health? Good   Feel stress (tense, anxious, or unable to sleep) Not at all         5/15/2024   Nutrition   Diet: Low salt    Low fat/cholesterol    Carbohydrate counting         5/15/2024   Exercise   Days per week of moderate/strenous exercise 7 days         5/15/2024   Social Factors   Frequency of gathering with friends or relatives More than three times a week   Worry food won't last until get money to buy more No   Food not last or not have enough money for food? No   Do you have housing?  No   Are you worried about losing your housing? No   Lack of transportation? No   Unable to get utilities (heat,electricity)? No   Want help with housing or utility concern? No         5/15/2024   Activities of Daily Living- Home Safety   Needs help with the following daily activites None of the above   Safety concerns in the home None of the above         5/15/2024   Dental   Dentist two times every year? (!) NO          No  data to display                    5/15/2024   Urinary Incontinence Screening   Bothered by leaking urine in past 6 months Yes         5/15/2024   TB Screening   Were you born outside of the [place]? Yes         Today's PHQ-2 Score:       5/15/2024     3:29 PM   PHQ-2 ( 1999 Pfizer)   Q1: Little interest or pleasure in doing things 0   Q2: Feeling down, depressed or hopeless 0   PHQ-2 Score 0           5/15/2024   Substance Use   Alcohol more than 3/day or more than 7/wk No   Do you have a current opioid prescription? No   How severe/bad is pain from 1 to 10? 6/10   Do you use any other substances recreationally? No     Social History     Tobacco Use    Smoking status: Never    Smokeless tobacco: Never   Vaping Use    Vaping status: Never Used   Substance Use Topics    Alcohol use: No    Drug use: No       Last PSA:   PSA   Date Value Ref Range Status   05/18/2017 1.90 0 - 4 ug/L Final     Comment:     Assay Method:  Chemiluminescence using Siemens Vista analyzer     ASCVD Risk   The ASCVD Risk score (Huan KRUSE, et al., 2019) failed to calculate for the following reasons:    Cannot find a previous HDL lab    Cannot find a previous total cholesterol lab            Reviewed and updated as needed this visit by Provider                    Past Medical History:   Diagnosis Date    Asthma, mild intermittent     Asthma, mild intermittent     GERD (gastroesophageal reflux disease)     Gout     HTN (hypertension)     HTN (hypertension), benign      Past Surgical History:   Procedure Laterality Date    ESOPHAGOSCOPY, GASTROSCOPY, DUODENOSCOPY (EGD), COMBINED N/A 7/22/2017    Procedure: COMBINED ESOPHAGOSCOPY, GASTROSCOPY, DUODENOSCOPY (EGD), BIOPSY SINGLE OR MULTIPLE;  COMBINED ESOPHAGOSCOPY, GASTROSCOPY, DUODENOSCOPY with biopsies ;  Surgeon: Darrick Moseley MD;  Location:  GI    HERNIA REPAIR  2010    left     HERNIA REPAIR  2010    LAPAROSCOPIC HERNIORRHAPHY INGUINAL BILATERAL Bilateral 6/1/2017    Procedure:  "LAPAROSCOPIC HERNIORRHAPHY INGUINAL BILATERAL;  LAPAROSCOPIC HERNIORRHAPHY INGUINAL BILATERAL with mesh;  Surgeon: Pola Barbosa MD;  Location: [facility] OR     Current providers sharing in care for this patient include:  Patient Care Team:  Carola Grullon APRN CNP as PCP - General (Nurse Practitioner - Family)  No Ref-Primary, Physician  Carola Grullon APRN CNP (Nurse Practitioner - Family)  William Moon MD as Assigned PCP    The following health maintenance items are reviewed in Epic and correct as of today:  Health Maintenance   Topic Date Due    ANNUAL REVIEW OF HM ORDERS  Never done    ASTHMA ACTION PLAN  Never done    ASTHMA CONTROL TEST  Never done    ADVANCE CARE PLANNING  Never done    Pneumococcal Vaccine: 65+ Years (1 of 2 - PCV) Never done    COLORECTAL CANCER SCREENING  Never done    HIV SCREENING  Never done    HEPATITIS C SCREENING  Never done    DTAP/TDAP/TD IMMUNIZATION (1 - Tdap) Never done    ZOSTER IMMUNIZATION (1 of 2) Never done    LIPID  05/18/2018    RSV VACCINE (Pregnancy & 60+) (1 - 1-dose 60+ series) Never done    GLUCOSE  08/13/2020    FALL RISK ASSESSMENT  Never done    COVID-19 Vaccine (3 - 2023-24 season) 09/01/2023    INFLUENZA VACCINE (Season Ended) 09/01/2024    MEDICARE ANNUAL WELLNESS VISIT  05/15/2025    PHQ-2 (once per calendar year)  Completed    IPV IMMUNIZATION  Aged Out    HPV IMMUNIZATION  Aged Out    MENINGITIS IMMUNIZATION  Aged Out    RSV MONOCLONAL ANTIBODY  Aged Out         Review of Systems  CONSTITUTIONAL: NEGATIVE for fever, chills, change in weight  ENT/MOUTH: NEGATIVE for ear, mouth and throat problems  RESP: NEGATIVE for significant cough or SOB  CV: NEGATIVE for chest pain, palpitations or peripheral edema     Objective    Exam  /66 (BP Location: Left arm, Patient Position: Chair, Cuff Size: Adult Regular)   Pulse 65   Temp 97.3  F (36.3  C) (Oral)   Resp 10   Ht 1.6 m (5' 3\")   Wt 72.2 kg (159 lb 3.2 oz)   SpO2 96%   BMI " "28.20 kg/m     Estimated body mass index is 28.2 kg/m  as calculated from the following:    Height as of this encounter: 1.6 m (5' 3\").    Weight as of this encounter: 72.2 kg (159 lb 3.2 oz).    Physical Exam  GENERAL: alert and no distress  EYES: Eyes grossly normal to inspection, PERRL and conjunctivae and sclerae normal  HENT: ear canals and TM's normal, nose and mouth without ulcers or lesions  NECK: no adenopathy, no asymmetry, masses, or scars  RESP: lungs clear to auscultation - occasional wheezing heard.  CV: regular rate and rhythm, normal S1 S2, no S3 or S4, no murmur, click or rub, no peripheral edema  ABDOMEN: soft, nontender, no hepatosplenomegaly, no masses and bowel sounds normal  MS: no gross musculoskeletal defects noted, no edema  SKIN: no suspicious lesions or rashes  NEURO: Normal strength and tone, mentation intact and speech normal  PSYCH: mentation appears normal, affect normal/bright        5/15/2024   Mini Cog   Clock Draw Score 0 Abnormal   3 Item Recall 2 objects recalled   Mini Cog Total Score 2   This is mainly due to language barrier.  Pt         Signed Electronically by: Griffin Marquis MD    "

## 2024-05-15 NOTE — PATIENT INSTRUCTIONS
"Preventive Care Advice   This is general advice we often give to help people stay healthy. Your care team may have specific advice just for you. Please talk to your care team about your own preventive care needs.  Lifestyle  Exercise at least 150 minutes each week (30 minutes a day, 5 days a week).  Do muscle strengthening activities 2 days a week. These help control your weight and prevent disease.  No smoking.  Wear sunscreen to prevent skin cancer.  Have your home tested for radon every 2 to 5 years. Radon is a colorless, odorless gas that can harm your lungs. To learn more, go to www.health.[facility].mn. and search for \"Radon in Homes.\"  Keep guns unloaded and locked up in a safe place like a safe or gun vault, or, use a gun lock and hide the keys. Always lock away bullets separately. To learn more, visit [employer]rium[employer].mn.gov and search for \"safe gun storage.\"  Nutrition  Eat 5 or more servings of fruits and vegetables each day.  Try wheat bread, brown rice and whole grain pasta (instead of white bread, rice, and pasta).  Get enough calcium and vitamin D. Check the label on foods and aim for 100% of the RDA (recommended daily allowance).  Regular exams  Have a dental exam and cleaning every 6 months.  See your health care team every year to talk about:  Any changes in your health.  Any medicines your care team has prescribed.  Preventive care, family planning, and ways to prevent chronic diseases.  Shots (vaccines)   HPV shots (up to age 26), if you've never had them before.  Hepatitis B shots (up to age 59), if you've never had them before.  COVID-19 shot: Get this shot when it's due.  Flu shot: Get a flu shot every year.  Tetanus shot: Get a tetanus shot every 10 years.  Pneumococcal, hepatitis A, and RSV shots: Ask your care team if you need these based on your risk.  Shingles shot (for age 50 and up).  General health tests  Diabetes screening:  Starting at age 35, Get screened for diabetes at least every 3 years.  If " you are younger than age 35, ask your care team if you should be screened for diabetes.  Cholesterol test: At age 39, start having a cholesterol test every 5 years, or more often if advised.  Bone density scan (DEXA): At age 50, ask your care team if you should have this scan for osteoporosis (brittle bones).  Hepatitis C: Get tested at least once in your life.  Abdominal aortic aneurysm screening: Talk to your doctor about having this screening if you:  Have ever smoked; and  Are biologically male; and  Are between the ages of 65 and 75.  STIs (sexually transmitted infections)  Before age 24: Ask your care team if you should be screened for STIs.  After age 24: Get screened for STIs if you're at risk. You are at risk for STIs (including HIV) if:  You are sexually active with more than one person.  You don't use condoms every time.  You or a partner was diagnosed with a sexually transmitted infection.  If you are at risk for HIV, ask about PrEP medicine to prevent HIV.  Get tested for HIV at least once in your life, whether you are at risk for HIV or not.  Cancer screening tests  Cervical cancer screening: If you have a cervix, begin getting regular cervical cancer screening tests at age 21. Most people who have regular screenings with normal results can stop after age 65. Talk about this with your provider.  Breast cancer scan (mammogram): If you've ever had breasts, begin having regular mammograms starting at age 40. This is a scan to check for breast cancer.  Colon cancer screening: It is important to start screening for colon cancer at age 45.  Have a colonoscopy test every 10 years (or more often if you're at risk) Or, ask your provider about stool tests like a FIT test every year or Cologuard test every 3 years.  To learn more about your testing options, visit: www.[employer]/765702.pdf.  For help making a decision, visit: ni/nt59792.  Prostate cancer screening test: If you have a prostate and are age 55  to 69, ask your provider if you would benefit from a yearly prostate cancer screening test.  Lung cancer screening: If you are a current or former smoker age 50 to 80, ask your care team if ongoing lung cancer screenings are right for you.  For informational purposes only. Not to replace the advice of your health care provider. Copyright   2023 [employer]. All rights reserved. Clinically reviewed by the [facility] Transitions Program. [employer] [id number] - REV 04/24.

## 2024-05-16 ENCOUNTER — TELEPHONE (OUTPATIENT)
Dept: FAMILY MEDICINE | Facility: CLINIC | Age: 64
End: 2024-05-16
Payer: COMMERCIAL

## 2024-05-16 LAB
HCV AB SERPL QL IA: NONREACTIVE
HIV 1+2 AB+HIV1 P24 AG SERPL QL IA: NONREACTIVE

## 2024-05-16 NOTE — TELEPHONE ENCOUNTER
Outgoing call to patient's daughter Kay, CTC on file. Labs and xray are not resulted yet. We will call when they result. Patient states understanding. She states to please call her with the results (no  needed).    Charline Norwood RN

## 2024-05-16 NOTE — TELEPHONE ENCOUNTER
Test Results    Contacts         Type Contact Phone/Fax    05/16/2024 04:28 PM CDT Phone (Incoming) Kay Smith (Emergency Contact) [phone] (M)            Who ordered the test:  Dr. Marquis    Type of test: Lab    Date of test:  5/15/2024    Where was the test performed:  [facility]    What are your questions/concerns?:  Daughter is requesting a call back to go over lab results.    Could we send this information to you in [facility] or would you prefer to receive a phone call?:   Patient would prefer a phone call   Okay to leave a detailed message?: Yes at Other phone number:  [phone]

## 2024-05-17 ENCOUNTER — TELEPHONE (OUTPATIENT)
Dept: INTERNAL MEDICINE | Facility: CLINIC | Age: 64
End: 2024-05-17
Payer: COMMERCIAL

## 2024-05-17 LAB
ANION GAP SERPL CALCULATED.3IONS-SCNC: 10 MMOL/L (ref 7–15)
BUN SERPL-MCNC: 28.4 MG/DL (ref 8–23)
CALCIUM SERPL-MCNC: 9.5 MG/DL (ref 8.8–10.2)
CHLORIDE SERPL-SCNC: 103 MMOL/L (ref 98–107)
CHOLEST SERPL-MCNC: 263 MG/DL
CREAT SERPL-MCNC: 0.84 MG/DL (ref 0.67–1.17)
DEPRECATED HCO3 PLAS-SCNC: 27 MMOL/L (ref 22–29)
EGFRCR SERPLBLD CKD-EPI 2021: >90 ML/MIN/1.73M2
FASTING STATUS PATIENT QL REPORTED: NO
GLUCOSE SERPL-MCNC: 113 MG/DL (ref 70–99)
GLUCOSE SERPL-MCNC: 113 MG/DL (ref 70–99)
HDLC SERPL-MCNC: 58 MG/DL
LDLC SERPL CALC-MCNC: 158 MG/DL
NONHDLC SERPL-MCNC: 205 MG/DL
POTASSIUM SERPL-SCNC: 4.7 MMOL/L (ref 3.4–5.3)
SODIUM SERPL-SCNC: 140 MMOL/L (ref 135–145)
TRIGL SERPL-MCNC: 237 MG/DL

## 2024-05-17 NOTE — TELEPHONE ENCOUNTER
Called patient using assistance of [employer]  unable to reach,  full unable to leave message     Adilene Mata, Registered Nurse  [facility]

## 2024-05-20 NOTE — TELEPHONE ENCOUNTER
Attempt # 2. Unable to to leave message on machine.    Charline Norwood RN on 5/20/2024 at 1:55 PM

## 2024-05-20 NOTE — TELEPHONE ENCOUNTER
Dr. Marquis,    Pt's daughter calling back for results (CTC on file). Relayed provider message below. She states she will inform her father and inquire about starting a statin. She asks that the medication be sent to [facility] in [place] [id number]. Please review and advise, thanks!  Jeremie Dominguez RN on 5/20/2024 at 2:19 PM

## 2024-05-22 NOTE — TELEPHONE ENCOUNTER
Called daughter, Kay. Patient does not want to start cholesterol medication at this time.  Natalya Lucas RN

## 2024-05-29 LAB — NONINV COLON CA DNA+OCC BLD SCRN STL QL: NORMAL

## 2024-05-30 ENCOUNTER — HOSPITAL ENCOUNTER (EMERGENCY)
Facility: CLINIC | Age: 64
Discharge: HOME | End: 2024-05-30
Attending: EMERGENCY MEDICINE | Admitting: EMERGENCY MEDICINE
Payer: COMMERCIAL

## 2024-05-30 ENCOUNTER — APPOINTMENT (OUTPATIENT)
Dept: CT IMAGING | Facility: CLINIC | Age: 64
End: 2024-05-30
Attending: EMERGENCY MEDICINE
Payer: COMMERCIAL

## 2024-05-30 VITALS
BODY MASS INDEX: 27.02 KG/M2 | OXYGEN SATURATION: 96 % | HEART RATE: 59 BPM | TEMPERATURE: 97.9 F | DIASTOLIC BLOOD PRESSURE: 63 MMHG | SYSTOLIC BLOOD PRESSURE: 148 MMHG | RESPIRATION RATE: 16 BRPM | HEIGHT: 64 IN | WEIGHT: 158.29 LBS

## 2024-05-30 DIAGNOSIS — N39.0 URINARY TRACT INFECTION WITHOUT HEMATURIA, SITE UNSPECIFIED: ICD-10-CM

## 2024-05-30 LAB
ALBUMIN SERPL BCG-MCNC: 4.1 G/DL (ref 3.5–5.2)
ALBUMIN UR-MCNC: NEGATIVE MG/DL
ALP SERPL-CCNC: 63 U/L (ref 40–150)
ALT SERPL W P-5'-P-CCNC: 17 U/L (ref 0–70)
ANION GAP SERPL CALCULATED.3IONS-SCNC: 11 MMOL/L (ref 7–15)
APPEARANCE UR: CLEAR
AST SERPL W P-5'-P-CCNC: 22 U/L (ref 0–45)
ATRIAL RATE - MUSE: 61 BPM
BASOPHILS # BLD AUTO: 0 10E3/UL (ref 0–0.2)
BASOPHILS NFR BLD AUTO: 1 %
BILIRUB DIRECT SERPL-MCNC: <0.2 MG/DL (ref 0–0.3)
BILIRUB SERPL-MCNC: 0.3 MG/DL
BILIRUB UR QL STRIP: NEGATIVE
BUN SERPL-MCNC: 10.5 MG/DL (ref 8–23)
CALCIUM SERPL-MCNC: 9.1 MG/DL (ref 8.8–10.2)
CHLORIDE SERPL-SCNC: 103 MMOL/L (ref 98–107)
COLOR UR AUTO: ABNORMAL
CREAT SERPL-MCNC: 0.85 MG/DL (ref 0.67–1.17)
DEPRECATED HCO3 PLAS-SCNC: 27 MMOL/L (ref 22–29)
DIASTOLIC BLOOD PRESSURE - MUSE: NORMAL MMHG
EGFRCR SERPLBLD CKD-EPI 2021: >90 ML/MIN/1.73M2
EOSINOPHIL # BLD AUTO: 0.2 10E3/UL (ref 0–0.7)
EOSINOPHIL NFR BLD AUTO: 3 %
ERYTHROCYTE [DISTWIDTH] IN BLOOD BY AUTOMATED COUNT: 13.2 % (ref 10–15)
GLUCOSE SERPL-MCNC: 105 MG/DL (ref 70–99)
GLUCOSE UR STRIP-MCNC: NEGATIVE MG/DL
HCT VFR BLD AUTO: 40.5 % (ref 40–53)
HGB BLD-MCNC: 13 G/DL (ref 13.3–17.7)
HGB UR QL STRIP: NEGATIVE
IMM GRANULOCYTES # BLD: 0 10E3/UL
IMM GRANULOCYTES NFR BLD: 0 %
INTERPRETATION ECG - MUSE: NORMAL
KETONES UR STRIP-MCNC: NEGATIVE MG/DL
LACTATE SERPL-SCNC: 1.2 MMOL/L (ref 0.7–2)
LEUKOCYTE ESTERASE UR QL STRIP: ABNORMAL
LYMPHOCYTES # BLD AUTO: 1.9 10E3/UL (ref 0.8–5.3)
LYMPHOCYTES NFR BLD AUTO: 27 %
MAGNESIUM SERPL-MCNC: 2.5 MG/DL (ref 1.7–2.3)
MCH RBC QN AUTO: 30.8 PG (ref 26.5–33)
MCHC RBC AUTO-ENTMCNC: 32.1 G/DL (ref 31.5–36.5)
MCV RBC AUTO: 96 FL (ref 78–100)
MONOCYTES # BLD AUTO: 0.8 10E3/UL (ref 0–1.3)
MONOCYTES NFR BLD AUTO: 11 %
MUCOUS THREADS #/AREA URNS LPF: PRESENT /LPF
NEUTROPHILS # BLD AUTO: 4.3 10E3/UL (ref 1.6–8.3)
NEUTROPHILS NFR BLD AUTO: 58 %
NITRATE UR QL: NEGATIVE
NRBC # BLD AUTO: 0 10E3/UL
NRBC BLD AUTO-RTO: 0 /100
P AXIS - MUSE: 76 DEGREES
PH UR STRIP: 5 [PH] (ref 5–7)
PLATELET # BLD AUTO: 284 10E3/UL (ref 150–450)
POTASSIUM SERPL-SCNC: 4.7 MMOL/L (ref 3.4–5.3)
POTASSIUM SERPL-SCNC: 5.8 MMOL/L (ref 3.4–5.3)
PR INTERVAL - MUSE: 158 MS
PROT SERPL-MCNC: 7.1 G/DL (ref 6.4–8.3)
QRS DURATION - MUSE: 76 MS
QT - MUSE: 376 MS
QTC - MUSE: 378 MS
R AXIS - MUSE: 50 DEGREES
RBC # BLD AUTO: 4.22 10E6/UL (ref 4.4–5.9)
RBC URINE: 4 /HPF
SODIUM SERPL-SCNC: 141 MMOL/L (ref 135–145)
SP GR UR STRIP: 1.01 (ref 1–1.03)
SYSTOLIC BLOOD PRESSURE - MUSE: NORMAL MMHG
T AXIS - MUSE: 56 DEGREES
TROPONIN T SERPL HS-MCNC: 13 NG/L
UROBILINOGEN UR STRIP-MCNC: NORMAL MG/DL
VENTRICULAR RATE- MUSE: 61 BPM
WBC # BLD AUTO: 7.3 10E3/UL (ref 4–11)
WBC CLUMPS #/AREA URNS HPF: PRESENT /HPF
WBC URINE: 33 /HPF

## 2024-05-30 PROCEDURE — 250N000009 HC RX 250: Performed by: EMERGENCY MEDICINE

## 2024-05-30 PROCEDURE — 87086 URINE CULTURE/COLONY COUNT: CPT | Performed by: EMERGENCY MEDICINE

## 2024-05-30 PROCEDURE — 36415 COLL VENOUS BLD VENIPUNCTURE: CPT | Performed by: EMERGENCY MEDICINE

## 2024-05-30 PROCEDURE — 85025 COMPLETE CBC W/AUTO DIFF WBC: CPT | Performed by: EMERGENCY MEDICINE

## 2024-05-30 PROCEDURE — 84132 ASSAY OF SERUM POTASSIUM: CPT | Mod: 91 | Performed by: EMERGENCY MEDICINE

## 2024-05-30 PROCEDURE — 84484 ASSAY OF TROPONIN QUANT: CPT | Performed by: EMERGENCY MEDICINE

## 2024-05-30 PROCEDURE — 80048 BASIC METABOLIC PNL TOTAL CA: CPT | Performed by: EMERGENCY MEDICINE

## 2024-05-30 PROCEDURE — 250N000011 HC RX IP 250 OP 636: Performed by: EMERGENCY MEDICINE

## 2024-05-30 PROCEDURE — 82040 ASSAY OF SERUM ALBUMIN: CPT | Performed by: EMERGENCY MEDICINE

## 2024-05-30 PROCEDURE — 93005 ELECTROCARDIOGRAM TRACING: CPT

## 2024-05-30 PROCEDURE — 80053 COMPREHEN METABOLIC PANEL: CPT | Performed by: EMERGENCY MEDICINE

## 2024-05-30 PROCEDURE — 250N000013 HC RX MED GY IP 250 OP 250 PS 637: Performed by: EMERGENCY MEDICINE

## 2024-05-30 PROCEDURE — 83735 ASSAY OF MAGNESIUM: CPT | Performed by: EMERGENCY MEDICINE

## 2024-05-30 PROCEDURE — 71260 CT THORAX DX C+: CPT

## 2024-05-30 PROCEDURE — 99285 EMERGENCY DEPT VISIT HI MDM: CPT | Mod: 25

## 2024-05-30 PROCEDURE — 81001 URINALYSIS AUTO W/SCOPE: CPT | Performed by: EMERGENCY MEDICINE

## 2024-05-30 PROCEDURE — 83605 ASSAY OF LACTIC ACID: CPT | Performed by: EMERGENCY MEDICINE

## 2024-05-30 RX ORDER — IOPAMIDOL 755 MG/ML
500 INJECTION, SOLUTION INTRAVASCULAR ONCE
Status: COMPLETED | OUTPATIENT
Start: 2024-05-30 | End: 2024-05-30

## 2024-05-30 RX ORDER — SULFAMETHOXAZOLE/TRIMETHOPRIM 800-160 MG
1 TABLET ORAL 2 TIMES DAILY
Qty: 20 TABLET | Refills: 0 | Status: ON HOLD | OUTPATIENT
Start: 2024-05-30 | End: 2024-06-06

## 2024-05-30 RX ORDER — MAGNESIUM HYDROXIDE/ALUMINUM HYDROXICE/SIMETHICONE 120; 1200; 1200 MG/30ML; MG/30ML; MG/30ML
15 SUSPENSION ORAL ONCE
Status: COMPLETED | OUTPATIENT
Start: 2024-05-30 | End: 2024-05-30

## 2024-05-30 RX ADMIN — SODIUM CHLORIDE 60 ML: 9 INJECTION, SOLUTION INTRAVENOUS at 14:54

## 2024-05-30 RX ADMIN — IOPAMIDOL 72 ML: 755 INJECTION, SOLUTION INTRAVENOUS at 14:43

## 2024-05-30 RX ADMIN — ALUMINUM HYDROXIDE, MAGNESIUM HYDROXIDE, AND SIMETHICONE 15 ML: 1200; 120; 1200 SUSPENSION ORAL at 14:15

## 2024-05-30 ASSESSMENT — COLUMBIA-SUICIDE SEVERITY RATING SCALE - C-SSRS
2. HAVE YOU ACTUALLY HAD ANY THOUGHTS OF KILLING YOURSELF IN THE PAST MONTH?: NO
1. IN THE PAST MONTH, HAVE YOU WISHED YOU WERE DEAD OR WISHED YOU COULD GO TO SLEEP AND NOT WAKE UP?: NO
6. HAVE YOU EVER DONE ANYTHING, STARTED TO DO ANYTHING, OR PREPARED TO DO ANYTHING TO END YOUR LIFE?: NO

## 2024-05-30 ASSESSMENT — ACTIVITIES OF DAILY LIVING (ADL)
ADLS_ACUITY_SCORE: 36

## 2024-05-30 NOTE — ED PROVIDER NOTES
"  Emergency Department Note      History of Present Illness     Chief Complaint  Abdominal Pain    HPI  Luis Yates is a 63 year old male with a history of GERD, gout, and hypertension, who presents to the ED for abdominal pain that radiates to his upper and lower back. The pain started 4 days ago (5/26/24) as a burning pain, and worsened yesterday (5/29/24). The patient endorses pain during urination, and cannot sleep at night. He denies having dark stool, kidney problems, or being on blood thinners. Of note, the patient states that upper back pain is common with his gastritis, but lower back pain is uncommon.     Independent Historian  Daughter present at bedside to serve as  and provide partial history.     Review of External Notes  Urgent care encounter from today noted, however no documentation available yet.  Past Medical History   Medical History and Problem List  Asthma, mild intermittent  GERD   Gout  Hypertension   Hernia    Medications  Albuterol  Amlodipine-valsartan  Atenolol  Tamsolusin  Losartan   Colchicine  Azithromycin   Beclomethasone    Surgical History   Combined EGD   Hernia repair  Laparoscopic herniorrhaphy inguinal bilateral  Physical Exam   Patient Vitals for the past 24 hrs:   BP Temp Temp src Pulse Resp SpO2 Height Weight   05/30/24 1557 (!) 148/63 -- -- 59 16 96 % -- --   05/30/24 1146 117/64 97.9  F (36.6  C) Temporal 66 17 98 % 1.626 m (5' 4\") 71.8 kg (158 lb 4.6 oz)     Physical Exam  General: Sitting on the ED chair  HEENT: Normocephalic, atraumatic  Cardiac: Radial pulses 2+, regular rate and rhythm  Pulm: Breathing comfortably, no accessory muscle usage, no conversational dyspnea, and lungs clear bilaterally  GI: Abdomen soft, diffuse mild tenderness to palpation, no rigidity or guarding  MSK: No bony deformities, no CVAT  Skin: Warm and dry  Neuro: Moves all extremities  Psych: Pleasant mood and affect      Diagnostics   Lab Results   Labs Ordered and Resulted from " Time of ED Arrival to Time of ED Departure   BASIC METABOLIC PANEL - Abnormal       Result Value    Sodium 141      Potassium 5.8 (*)     Chloride 103      Carbon Dioxide (CO2) 27      Anion Gap 11      Urea Nitrogen 10.5      Creatinine 0.85      GFR Estimate >90      Calcium 9.1      Glucose 105 (*)    CBC WITH PLATELETS AND DIFFERENTIAL - Abnormal    WBC Count 7.3      RBC Count 4.22 (*)     Hemoglobin 13.0 (*)     Hematocrit 40.5      MCV 96      MCH 30.8      MCHC 32.1      RDW 13.2      Platelet Count 284      % Neutrophils 58      % Lymphocytes 27      % Monocytes 11      % Eosinophils 3      % Basophils 1      % Immature Granulocytes 0      NRBCs per 100 WBC 0      Absolute Neutrophils 4.3      Absolute Lymphocytes 1.9      Absolute Monocytes 0.8      Absolute Eosinophils 0.2      Absolute Basophils 0.0      Absolute Immature Granulocytes 0.0      Absolute NRBCs 0.0     MAGNESIUM - Abnormal    Magnesium 2.5 (*)    ROUTINE UA WITH MICROSCOPIC REFLEX TO CULTURE - Abnormal    Color Urine Light Yellow      Appearance Urine Clear      Glucose Urine Negative      Bilirubin Urine Negative      Ketones Urine Negative      Specific Gravity Urine 1.012      Blood Urine Negative      pH Urine 5.0      Protein Albumin Urine Negative      Urobilinogen Urine Normal      Nitrite Urine Negative      Leukocyte Esterase Urine Large (*)     WBC Clumps Urine Present (*)     Mucus Urine Present (*)     RBC Urine 4 (*)     WBC Urine 33 (*)    TROPONIN T, HIGH SENSITIVITY - Normal    Troponin T, High Sensitivity 13     HEPATIC FUNCTION PANEL - Normal    Protein Total 7.1      Albumin 4.1      Bilirubin Total 0.3      Alkaline Phosphatase 63      AST 22      ALT 17      Bilirubin Direct <0.20     LACTIC ACID WHOLE BLOOD WITH 1X REPEAT IN 2 HR WHEN >2 - Normal    Lactic Acid, Initial 1.2     POTASSIUM - Normal    Potassium 4.7     URINE CULTURE       Imaging  CT Aortic Survey w Contrast   Final Result   IMPRESSION:   1.  No aortic  aneurysm or dissection. No acute findings in the chest,   abdomen and pelvis.   2.  Cholelithiasis.   3.  Indeterminate nodular enhancement of the left half of the prostate   gland. Correlate with serum prostate-specific antigen levels and   consider nonemergent follow-up prostrate MRI.   4.  A few subcentimeter foci of enhancement in the liver are too small   to characterize and not well characterized on this single contrast   phase. These are favored to be benign perfusion abnormalities, flash   filling hemangiomas or benign focal nodular hyperplasia. If there is a   history of malignancy, liver MRI can be considered.      CECI POLO MD            SYSTEM ID:  [id number]        EKG   ECG results from 05/30/24   EKG 12-lead, tracing only     Value    Systolic Blood Pressure     Diastolic Blood Pressure     Ventricular Rate 61    Atrial Rate 61    NV Interval 158    QRS Duration 76        QTc 378    P Axis 76    R AXIS 50    T Axis 56    Interpretation ECG      Sinus rhythm  Normal ECG  When compared with ECG of 25-FEB-2022 09:52,  Vent. rate has decreased BY  32 BPM  T wave amplitude has increased in Anterior leads  QT has shortened  Interpreted by Dr. Hess at 1136         Independent Interpretation  None  ED Course    Medications Administered  Medications   alum & mag hydroxide-simethicone (MAALOX) suspension 15 mL (15 mLs Oral $Given 5/30/24 1415)   CT scan flush use (60 mLs Intravenous $Given 5/30/24 1454)   iopamidol (ISOVUE-370) solution 500 mL (72 mLs Intravenous $Given 5/30/24 1443)       Procedures  Procedures     Discussion of Management  None    Social Determinants of Health adding to complexity of care  None    ED Course  ED Course as of 05/30/24 1956   Thu May 30, 2024   1354 I obtained the history and examined the patient as above.    1608 I rechecked and updated the patient as above      Medical Decision Making / Diagnosis   CMS Diagnoses: None    MIPS     None    HALIE Yates is a  63-year-old male who presents with abdominal pain, upper and lower back pain. Vital signs are thankfully stable. The patient states he has had similar symptoms in the past with gastritis. However, given his age, intrascapular upper back pain I do think that further workup is warranted with labs and imaging. CT aortogram was ordered and shows no dissection. Lab work is overall reassuring.  Urinalysis is consistent with a UTI which I suspect is the cause of the patient's lower abdomen and lower back pain. Patient was given a GI cocktail. Upon reevaluation, discussed the findings above.  With all the above in mind, I do think that he is stable for further management as an outpatient. Plan is discharge home on Bactrim for the UTI, also initiating omeprazole for the patient's epigastric pain that seems consistent with gastritis.  Also recommending primary care follow-up.  Patient and his family member expressed understanding and agreement.    Disposition  The patient was discharged.     ICD-10 Codes:    ICD-10-CM    1. Urinary tract infection without hematuria, site unspecified  N39.0            Discharge Medications  Discharge Medication List as of 5/30/2024  4:19 PM        START taking these medications    Details   sulfamethoxazole-trimethoprim (BACTRIM DS) 800-160 MG tablet Take 1 tablet by mouth 2 times daily for 10 days, Disp-20 tablet, R-0, Local Print      omeprazole (PRILOSEC) 2 mg/mL suspension Take 10 mLs (20 mg) by mouth daily, Disp-300 mL, R-0, Local Print               Scribe Disclosure:  I, Teagan Chaidez, am serving as a scribe at 2:05 PM on 5/30/2024 to document services personally performed by Zain Garcia MD based on my observations and the provider's statements to me.        Zain Garcia MD  05/30/24 1956

## 2024-05-30 NOTE — ED NOTES
All patient questions answered, no further questions at this time. Discharge paperwork reviewed with patient. Patient verbalized understanding of discharge teaching, medications, when to return, and the importance of follow up. Patient verbalizes feeling safe to return home.

## 2024-05-30 NOTE — ED TRIAGE NOTES
Pt comes in with upper abdominal pain that he states started 4 days ago.  Daughter is willing to translate for triage.  Pt states that he has a hx of gastritis and he thinks this may he the issue again.  He states the pain is in his abdomen and back and that he has noted increased burping.       Triage Assessment (Adult)       Row Name 05/30/24 1147          Triage Assessment    Airway WDL WDL        Respiratory WDL    Respiratory WDL WDL        Cardiac WDL    Cardiac WDL WDL

## 2024-05-30 NOTE — DISCHARGE INSTRUCTIONS
Your CT scan showed some nonemergent abnormalities of the prostate and liver.  Please speak with your primary care provider regarding the possibility of MRI of those areas for further evaluation.

## 2024-05-31 LAB — BACTERIA UR CULT: NO GROWTH

## 2024-06-04 ENCOUNTER — APPOINTMENT (OUTPATIENT)
Dept: CT IMAGING | Facility: CLINIC | Age: 64
End: 2024-06-04
Attending: EMERGENCY MEDICINE
Payer: COMMERCIAL

## 2024-06-04 ENCOUNTER — HOSPITAL ENCOUNTER (INPATIENT)
Facility: CLINIC | Age: 64
LOS: 2 days | Discharge: HOME | End: 2024-06-06
Attending: EMERGENCY MEDICINE | Admitting: HOSPITALIST
Payer: COMMERCIAL

## 2024-06-04 ENCOUNTER — APPOINTMENT (OUTPATIENT)
Dept: MRI IMAGING | Facility: CLINIC | Age: 64
End: 2024-06-04
Attending: PSYCHIATRY & NEUROLOGY
Payer: COMMERCIAL

## 2024-06-04 DIAGNOSIS — R53.1 LEFT-SIDED WEAKNESS: ICD-10-CM

## 2024-06-04 DIAGNOSIS — R10.13 EPIGASTRIC PAIN: ICD-10-CM

## 2024-06-04 DIAGNOSIS — R09.02 HYPOXIA: ICD-10-CM

## 2024-06-04 DIAGNOSIS — I10 HTN (HYPERTENSION), BENIGN: Primary | ICD-10-CM

## 2024-06-04 LAB
ALBUMIN SERPL BCG-MCNC: 3.4 G/DL (ref 3.5–5.2)
ALP SERPL-CCNC: 53 U/L (ref 40–150)
ALT SERPL W P-5'-P-CCNC: 9 U/L (ref 0–70)
ANION GAP SERPL CALCULATED.3IONS-SCNC: 10 MMOL/L (ref 7–15)
APTT PPP: 29 SECONDS (ref 22–38)
AST SERPL W P-5'-P-CCNC: 23 U/L (ref 0–45)
ATRIAL RATE - MUSE: 62 BPM
BASOPHILS # BLD AUTO: 0 10E3/UL (ref 0–0.2)
BASOPHILS NFR BLD AUTO: 1 %
BILIRUB DIRECT SERPL-MCNC: <0.2 MG/DL (ref 0–0.3)
BILIRUB SERPL-MCNC: 0.2 MG/DL
BUN SERPL-MCNC: 12.5 MG/DL (ref 8–23)
CALCIUM SERPL-MCNC: 8.5 MG/DL (ref 8.8–10.2)
CHLORIDE SERPL-SCNC: 96 MMOL/L (ref 98–107)
COHGB MFR BLD: 0.8 % (ref 0–2)
CREAT SERPL-MCNC: 1.2 MG/DL (ref 0.67–1.17)
D DIMER PPP FEU-MCNC: 1.84 UG/ML FEU (ref 0–0.5)
DEPRECATED HCO3 PLAS-SCNC: 22 MMOL/L (ref 22–29)
DIASTOLIC BLOOD PRESSURE - MUSE: NORMAL MMHG
EGFRCR SERPLBLD CKD-EPI 2021: 67 ML/MIN/1.73M2
EOSINOPHIL # BLD AUTO: 0.2 10E3/UL (ref 0–0.7)
EOSINOPHIL NFR BLD AUTO: 4 %
ERYTHROCYTE [DISTWIDTH] IN BLOOD BY AUTOMATED COUNT: 13.1 % (ref 10–15)
FLUAV RNA SPEC QL NAA+PROBE: NEGATIVE
FLUBV RNA RESP QL NAA+PROBE: NEGATIVE
GLUCOSE SERPL-MCNC: 96 MG/DL (ref 70–99)
HCT VFR BLD AUTO: 34.7 % (ref 40–53)
HGB BLD-MCNC: 11.2 G/DL (ref 13.3–17.7)
IMM GRANULOCYTES # BLD: 0 10E3/UL
IMM GRANULOCYTES NFR BLD: 0 %
INR PPP: 1.07 (ref 0.85–1.15)
INTERPRETATION ECG - MUSE: NORMAL
LIPASE SERPL-CCNC: 29 U/L (ref 13–60)
LYMPHOCYTES # BLD AUTO: 1.9 10E3/UL (ref 0.8–5.3)
LYMPHOCYTES NFR BLD AUTO: 29 %
MCH RBC QN AUTO: 30.4 PG (ref 26.5–33)
MCHC RBC AUTO-ENTMCNC: 32.3 G/DL (ref 31.5–36.5)
MCV RBC AUTO: 94 FL (ref 78–100)
METHGB MFR BLD: 0.9 % (ref 0–3)
MONOCYTES # BLD AUTO: 1.1 10E3/UL (ref 0–1.3)
MONOCYTES NFR BLD AUTO: 16 %
NEUTROPHILS # BLD AUTO: 3.4 10E3/UL (ref 1.6–8.3)
NEUTROPHILS NFR BLD AUTO: 50 %
NRBC # BLD AUTO: 0 10E3/UL
NRBC BLD AUTO-RTO: 0 /100
NT-PROBNP SERPL-MCNC: 407 PG/ML (ref 0–900)
P AXIS - MUSE: 56 DEGREES
PLATELET # BLD AUTO: 244 10E3/UL (ref 150–450)
POTASSIUM SERPL-SCNC: 4.5 MMOL/L (ref 3.4–5.3)
PR INTERVAL - MUSE: 146 MS
PROT SERPL-MCNC: 6.1 G/DL (ref 6.4–8.3)
QRS DURATION - MUSE: 74 MS
QT - MUSE: 388 MS
QTC - MUSE: 393 MS
R AXIS - MUSE: 32 DEGREES
RBC # BLD AUTO: 3.69 10E6/UL (ref 4.4–5.9)
RSV RNA SPEC NAA+PROBE: NEGATIVE
SARS-COV-2 RNA RESP QL NAA+PROBE: NEGATIVE
SODIUM SERPL-SCNC: 128 MMOL/L (ref 135–145)
SYSTOLIC BLOOD PRESSURE - MUSE: NORMAL MMHG
T AXIS - MUSE: 36 DEGREES
TROPONIN T SERPL HS-MCNC: 11 NG/L
TROPONIN T SERPL HS-MCNC: 12 NG/L
VENTRICULAR RATE- MUSE: 62 BPM
WBC # BLD AUTO: 6.7 10E3/UL (ref 4–11)

## 2024-06-04 PROCEDURE — 250N000011 HC RX IP 250 OP 636: Performed by: EMERGENCY MEDICINE

## 2024-06-04 PROCEDURE — 36415 COLL VENOUS BLD VENIPUNCTURE: CPT | Performed by: EMERGENCY MEDICINE

## 2024-06-04 PROCEDURE — 94640 AIRWAY INHALATION TREATMENT: CPT

## 2024-06-04 PROCEDURE — 85730 THROMBOPLASTIN TIME PARTIAL: CPT | Performed by: EMERGENCY MEDICINE

## 2024-06-04 PROCEDURE — 96374 THER/PROPH/DIAG INJ IV PUSH: CPT | Mod: 59

## 2024-06-04 PROCEDURE — 85379 FIBRIN DEGRADATION QUANT: CPT | Performed by: EMERGENCY MEDICINE

## 2024-06-04 PROCEDURE — 93005 ELECTROCARDIOGRAM TRACING: CPT

## 2024-06-04 PROCEDURE — 83880 ASSAY OF NATRIURETIC PEPTIDE: CPT | Performed by: EMERGENCY MEDICINE

## 2024-06-04 PROCEDURE — 120N000001 HC R&B MED SURG/OB

## 2024-06-04 PROCEDURE — 87637 SARSCOV2&INF A&B&RSV AMP PRB: CPT | Performed by: EMERGENCY MEDICINE

## 2024-06-04 PROCEDURE — 82248 BILIRUBIN DIRECT: CPT | Performed by: EMERGENCY MEDICINE

## 2024-06-04 PROCEDURE — 70551 MRI BRAIN STEM W/O DYE: CPT

## 2024-06-04 PROCEDURE — 250N000013 HC RX MED GY IP 250 OP 250 PS 637: Performed by: EMERGENCY MEDICINE

## 2024-06-04 PROCEDURE — 85049 AUTOMATED PLATELET COUNT: CPT | Performed by: EMERGENCY MEDICINE

## 2024-06-04 PROCEDURE — 83690 ASSAY OF LIPASE: CPT | Performed by: EMERGENCY MEDICINE

## 2024-06-04 PROCEDURE — 258N000003 HC RX IP 258 OP 636: Performed by: EMERGENCY MEDICINE

## 2024-06-04 PROCEDURE — 84484 ASSAY OF TROPONIN QUANT: CPT | Performed by: EMERGENCY MEDICINE

## 2024-06-04 PROCEDURE — 70450 CT HEAD/BRAIN W/O DYE: CPT

## 2024-06-04 PROCEDURE — 71275 CT ANGIOGRAPHY CHEST: CPT

## 2024-06-04 PROCEDURE — 99223 1ST HOSP IP/OBS HIGH 75: CPT | Performed by: HOSPITALIST

## 2024-06-04 PROCEDURE — 82375 ASSAY CARBOXYHB QUANT: CPT | Performed by: EMERGENCY MEDICINE

## 2024-06-04 PROCEDURE — 250N000009 HC RX 250: Performed by: EMERGENCY MEDICINE

## 2024-06-04 PROCEDURE — 83050 HGB METHEMOGLOBIN QUAN: CPT | Performed by: EMERGENCY MEDICINE

## 2024-06-04 PROCEDURE — 80048 BASIC METABOLIC PNL TOTAL CA: CPT | Performed by: EMERGENCY MEDICINE

## 2024-06-04 PROCEDURE — 70496 CT ANGIOGRAPHY HEAD: CPT

## 2024-06-04 PROCEDURE — 85610 PROTHROMBIN TIME: CPT | Performed by: EMERGENCY MEDICINE

## 2024-06-04 PROCEDURE — 99285 EMERGENCY DEPT VISIT HI MDM: CPT | Mod: 25

## 2024-06-04 RX ORDER — LIDOCAINE HYDROCHLORIDE 20 MG/ML
10 SOLUTION OROPHARYNGEAL ONCE
Status: COMPLETED | OUTPATIENT
Start: 2024-06-04 | End: 2024-06-04

## 2024-06-04 RX ORDER — MAGNESIUM HYDROXIDE/ALUMINUM HYDROXICE/SIMETHICONE 120; 1200; 1200 MG/30ML; MG/30ML; MG/30ML
15 SUSPENSION ORAL 3 TIMES DAILY PRN
Status: DISCONTINUED | OUTPATIENT
Start: 2024-06-04 | End: 2024-06-06 | Stop reason: HOSPADM

## 2024-06-04 RX ORDER — METHYLPREDNISOLONE SODIUM SUCCINATE 125 MG/2ML
125 INJECTION, POWDER, LYOPHILIZED, FOR SOLUTION INTRAMUSCULAR; INTRAVENOUS ONCE
Status: COMPLETED | OUTPATIENT
Start: 2024-06-04 | End: 2024-06-04

## 2024-06-04 RX ORDER — AMOXICILLIN 250 MG
2 CAPSULE ORAL 2 TIMES DAILY PRN
Status: DISCONTINUED | OUTPATIENT
Start: 2024-06-04 | End: 2024-06-06 | Stop reason: HOSPADM

## 2024-06-04 RX ORDER — PANTOPRAZOLE SODIUM 40 MG/1
40 TABLET, DELAYED RELEASE ORAL DAILY
Status: DISCONTINUED | OUTPATIENT
Start: 2024-06-05 | End: 2024-06-06 | Stop reason: HOSPADM

## 2024-06-04 RX ORDER — IOPAMIDOL 755 MG/ML
67 INJECTION, SOLUTION INTRAVASCULAR ONCE
Status: COMPLETED | OUTPATIENT
Start: 2024-06-04 | End: 2024-06-04

## 2024-06-04 RX ORDER — LIDOCAINE 40 MG/G
CREAM TOPICAL
Status: DISCONTINUED | OUTPATIENT
Start: 2024-06-04 | End: 2024-06-06 | Stop reason: HOSPADM

## 2024-06-04 RX ORDER — ALBUTEROL SULFATE 90 UG/1
2 AEROSOL, METERED RESPIRATORY (INHALATION) EVERY 6 HOURS PRN
Status: DISCONTINUED | OUTPATIENT
Start: 2024-06-04 | End: 2024-06-06 | Stop reason: HOSPADM

## 2024-06-04 RX ORDER — CALCIUM CARBONATE 500 MG/1
1000 TABLET, CHEWABLE ORAL 4 TIMES DAILY PRN
Status: DISCONTINUED | OUTPATIENT
Start: 2024-06-04 | End: 2024-06-06 | Stop reason: HOSPADM

## 2024-06-04 RX ORDER — ONDANSETRON 4 MG/1
4 TABLET, ORALLY DISINTEGRATING ORAL EVERY 6 HOURS PRN
Status: DISCONTINUED | OUTPATIENT
Start: 2024-06-04 | End: 2024-06-06 | Stop reason: HOSPADM

## 2024-06-04 RX ORDER — AMOXICILLIN 250 MG
1 CAPSULE ORAL 2 TIMES DAILY PRN
Status: DISCONTINUED | OUTPATIENT
Start: 2024-06-04 | End: 2024-06-06 | Stop reason: HOSPADM

## 2024-06-04 RX ORDER — IPRATROPIUM BROMIDE AND ALBUTEROL SULFATE 2.5; .5 MG/3ML; MG/3ML
3 SOLUTION RESPIRATORY (INHALATION) ONCE
Status: COMPLETED | OUTPATIENT
Start: 2024-06-04 | End: 2024-06-04

## 2024-06-04 RX ORDER — IOPAMIDOL 755 MG/ML
500 INJECTION, SOLUTION INTRAVASCULAR ONCE
Status: COMPLETED | OUTPATIENT
Start: 2024-06-04 | End: 2024-06-04

## 2024-06-04 RX ORDER — MAGNESIUM HYDROXIDE/ALUMINUM HYDROXICE/SIMETHICONE 120; 1200; 1200 MG/30ML; MG/30ML; MG/30ML
15 SUSPENSION ORAL ONCE
Status: COMPLETED | OUTPATIENT
Start: 2024-06-04 | End: 2024-06-04

## 2024-06-04 RX ORDER — ONDANSETRON 2 MG/ML
4 INJECTION INTRAMUSCULAR; INTRAVENOUS EVERY 6 HOURS PRN
Status: DISCONTINUED | OUTPATIENT
Start: 2024-06-04 | End: 2024-06-06 | Stop reason: HOSPADM

## 2024-06-04 RX ADMIN — SODIUM CHLORIDE 1000 ML: 9 INJECTION, SOLUTION INTRAVENOUS at 14:08

## 2024-06-04 RX ADMIN — LIDOCAINE HYDROCHLORIDE 10 ML: 20 SOLUTION ORAL at 13:51

## 2024-06-04 RX ADMIN — SODIUM CHLORIDE 1000 ML: 9 INJECTION, SOLUTION INTRAVENOUS at 14:46

## 2024-06-04 RX ADMIN — ALUMINUM HYDROXIDE, MAGNESIUM HYDROXIDE, AND DIMETHICONE 15 ML: 200; 20; 200 SUSPENSION ORAL at 13:51

## 2024-06-04 RX ADMIN — METHYLPREDNISOLONE SODIUM SUCCINATE 125 MG: 125 INJECTION, POWDER, FOR SOLUTION INTRAMUSCULAR; INTRAVENOUS at 14:47

## 2024-06-04 RX ADMIN — SODIUM CHLORIDE 80 ML: 9 INJECTION, SOLUTION INTRAVENOUS at 12:59

## 2024-06-04 RX ADMIN — IPRATROPIUM BROMIDE AND ALBUTEROL SULFATE 3 ML: .5; 3 SOLUTION RESPIRATORY (INHALATION) at 14:43

## 2024-06-04 RX ADMIN — SODIUM CHLORIDE 88 ML: 9 INJECTION, SOLUTION INTRAVENOUS at 15:31

## 2024-06-04 RX ADMIN — IOPAMIDOL 67 ML: 755 INJECTION, SOLUTION INTRAVENOUS at 12:52

## 2024-06-04 RX ADMIN — IOPAMIDOL 80 ML: 755 INJECTION, SOLUTION INTRAVENOUS at 15:31

## 2024-06-04 ASSESSMENT — ACTIVITIES OF DAILY LIVING (ADL)
ADLS_ACUITY_SCORE: 36
ADLS_ACUITY_SCORE: 19
ADLS_ACUITY_SCORE: 36
ADLS_ACUITY_SCORE: 36
ADLS_ACUITY_SCORE: 19
ADLS_ACUITY_SCORE: 36
ADLS_ACUITY_SCORE: 19

## 2024-06-04 ASSESSMENT — COLUMBIA-SUICIDE SEVERITY RATING SCALE - C-SSRS
1. IN THE PAST MONTH, HAVE YOU WISHED YOU WERE DEAD OR WISHED YOU COULD GO TO SLEEP AND NOT WAKE UP?: NO
6. HAVE YOU EVER DONE ANYTHING, STARTED TO DO ANYTHING, OR PREPARED TO DO ANYTHING TO END YOUR LIFE?: NO
2. HAVE YOU ACTUALLY HAD ANY THOUGHTS OF KILLING YOURSELF IN THE PAST MONTH?: NO

## 2024-06-04 NOTE — ED NOTES
Attempted to wean oxygen, saturations at 1 liter via NC 94%, saturations on room air 87-89%. No respiratory distress, patient does not wear oxygen at home, MD notified. Oxygen reapplied at 2 liters via NC.

## 2024-06-04 NOTE — ED TRIAGE NOTES
Seen here three days ago for the same complaint. Pt had a CT scan and was treated for gastritis. Pt continues to have upper and lower back pain. Pt had a 10 minute episode of being unsteady and numbness from face to foot on the left side at around 1015. This has since completely resolved. Stomach upset is more pronounced.     Pt is alert and oriented times four. Negative drift. Facial symmetry intact. Pt is alert and oriented times four. w

## 2024-06-04 NOTE — PHARMACY-ADMISSION MEDICATION HISTORY
Pharmacy Intern Admission Medication History    Admission medication history is complete. The information provided in this note is only as accurate as the sources available at the time of the update.    Information Source(s): Family member, CareEverywhere/[employer]s, and daughter  via in-person    Pertinent Information: Med list was verified from daughter. Pt have filled EXFORGE  mg and [place]enolol 50 [place]    Changes made to PTA medication list:  Added: None  Deleted: Herbals  Changed: None    Allergies reviewed with patient and updates made in EHR: yes    Medication History Completed By: Paresh Hussein 6/4/2024 6:05 PM    PTA Med List   Medication Sig Last Dose    albuterol (PROAIR HFA/PROVENTIL HFA/VENTOLIN HFA) 108 (90 Base) MCG/ACT inhaler Inhale 2 puffs into the lungs every 6 hours as needed for shortness of breath, wheezing or cough Past Week at -    amLODIPine-valsartan (EXFORGE)  MG tablet Take 1 tablet by mouth daily 6/4/2024 at am    atenolol (TENORMIN) 50 MG tablet Take 50 mg by mouth at bedtime 6/3/2024 at pm    fluticasone (ARNUITY ELLIPTA) 100 MCG/ACT inhaler Inhale 1 puff into the lungs daily Past Week at -    omeprazole (PRILOSEC) 20 MG DR capsule Take 1 capsule (20 mg) by mouth daily for 30 days 6/3/2024 at -    sulfamethoxazole-trimethoprim (BACTRIM DS) 800-160 MG tablet Take 1 tablet by mouth 2 times daily for 10 days 6/4/2024 at am

## 2024-06-04 NOTE — CONSULTS
[facility]    Stroke Telephone Note    I was called by  on 06/04/24 regarding patient Luis Yates. The patient is a 63 year old male with a PMH significant for astham, GERD, HTN, Gout. History obtained from ED provider. Luis presented after a 10 minute episode of unsteadiness and left sided (face, arm, leg) numbness that occurred at 10:15 am.     Vitals  BP: 115/64   Pulse: 105   Resp: 16   Temp: 99  F (37.2  C)   Weight: 71.7 kg (158 lb)    Stroke Code Data (for stroke code without tele)  Stroke code activated 06/04/24  1243   Stroke provider first response 06/04/24  1245   Last known normal 06/04/24  1015      Time of discovery (or onset of symptoms) 06/04/24  1015   Head CT read by Stroke Neuro Provider 06/04/24  1257   Was stroke code de-escalated? Yes  06/04/24  1316     Imaging Findings (per radiology)  CT head: No acute findings. No evidence of acute intracranial  hemorrhage.  CT ANGIOGRAM HEAD FINDINGS:  No evidence of large vessel occlusion,  high-grade stenosis, aneurysm, or high-flow vascular malformation.  Fetal origin of the left posterior cerebral artery.     CT ANGIOGRAM NECK FINDINGS:  No evidence of large vessel occlusion,  high-grade stenosis, or dissection.     Intravenous Thrombolysis  Not given due to:   - minor/isolated/quickly resolving symptoms    Endovascular Treatment  Not initiated due to absence of proximal vessel occlusion    Impression  Transient ischemic attack  Concern for possible TIA given rapidly resolving symptoms, but given the mild nature and associated global process in the form of abdominal pain unclear if a cerebrovascular cause versus personalization.    Recommendations   Recommending an MRI of the brain to evaluate for any signs of ischemia to direct further management.  Holding on antiplatelet therapy pending further evaluation of abdominal pain and whether or not any interventions need to be considered which may be impaired by the presence of  "dual antiplatelet.    Recommendations are based on the information provided over the phone by Luis Yates's in-person providers. They are not intended to replace the clinical judgment of his in-person providers. I was not requested to personally see or examine the patient at this time.     Kishan Lima MD  Vascular Neurology/Neurocritical Care    To page me or covering stroke neurology team member, click here: [facility]  Choose \"On Call\" tab at top, then select \"NEUROLOGY/ALL SITES\" from middle drop-down box, press Enter, then look for \"stroke\" or \"telestroke\" for your site.  "

## 2024-06-04 NOTE — ED PROVIDER NOTES
Emergency Department Note      History of Present Illness     Chief Complaint  One-sided Weakness    HPI  Luis Yates is a 63 year old male who presents to the emergency room with a 10 and an episode of left arm and left leg weakness to the point where he had a very unstable gait after getting out of the shower today, this was witnessed by his daughter.  He also complains of continued gastritis and upper and lower back pain.  Patient states that the back pain is more concerning to him at this time, and that the numbness and tingling have completely resolved.    Patient states however that it is much more concerning to him that he is still having some epigastric pain that is worse at night and similar to the same pain he had all last week.  He seems less concerned about the numbness and tingling aspect.      Independent Historian  Yes patient's daughter is at the bedside and supplies the majority of the history    Review of External Notes  Yes I reviewed the patient's last urgent care visit in the office setting on 30 May of this year the patient was seen for chest pain.  He was then sent to the emergency room where he received a diagnosis of a UTI and gastritis.      Past Medical History   Medical History and Problem List  Past Medical History:   Diagnosis Date    Asthma, mild intermittent     Asthma, mild intermittent     GERD (gastroesophageal reflux disease)     Gout     HTN (hypertension)     HTN (hypertension), benign        Medications  albuterol (PROAIR HFA/PROVENTIL HFA/VENTOLIN HFA) 108 (90 Base) MCG/ACT inhaler  amLODIPine-valsartan (EXFORGE)  MG tablet  atenolol (TENORMIN) 50 MG tablet  fluticasone (ARNUITY ELLIPTA) 100 MCG/ACT inhaler  HERBALS  omeprazole (PRILOSEC) 20 MG DR capsule  sulfamethoxazole-trimethoprim (BACTRIM DS) 800-160 MG tablet        Surgical History   Past Surgical History:   Procedure Laterality Date    ESOPHAGOSCOPY, GASTROSCOPY, DUODENOSCOPY (EGD), COMBINED N/A  "7/22/2017    Procedure: COMBINED ESOPHAGOSCOPY, GASTROSCOPY, DUODENOSCOPY (EGD), BIOPSY SINGLE OR MULTIPLE;  COMBINED ESOPHAGOSCOPY, GASTROSCOPY, DUODENOSCOPY with biopsies ;  Surgeon: Darrick Moseley MD;  Location:  GI    HERNIA REPAIR  2010    left     HERNIA REPAIR  2010    LAPAROSCOPIC HERNIORRHAPHY INGUINAL BILATERAL Bilateral 6/1/2017    Procedure: LAPAROSCOPIC HERNIORRHAPHY INGUINAL BILATERAL;  LAPAROSCOPIC HERNIORRHAPHY INGUINAL BILATERAL with mesh;  Surgeon: Pola Barbosa MD;  Location:  OR         Physical Exam   Patient Vitals for the past 24 hrs:   BP Temp Temp src Pulse Resp SpO2 Height Weight   06/04/24 1627 97/48 -- -- 64 -- 99 % -- --   06/04/24 1617 -- -- -- -- -- 96 % -- --   06/04/24 1616 95/49 -- -- 66 -- -- -- --   06/04/24 1509 -- -- -- -- -- 95 % -- --   06/04/24 1504 112/54 -- -- -- -- -- -- --   06/04/24 1502 -- -- -- -- -- (!) 86 % -- --   06/04/24 1500 112/54 -- -- 62 -- -- -- --   06/04/24 1459 -- -- -- -- -- (!) 88 % -- --   06/04/24 1458 110/57 -- -- 61 -- 90 % -- --   06/04/24 1455 -- -- -- -- -- (!) 88 % -- --   06/04/24 1413 100/63 -- -- -- -- 97 % -- --   06/04/24 1412 -- -- -- -- -- 97 % -- --   06/04/24 1406 -- -- -- -- -- (!) 87 % -- --   06/04/24 1358 98/63 -- -- -- -- 91 % -- --   06/04/24 1353 -- -- -- -- -- 94 % -- --   06/04/24 1340 101/57 -- -- -- -- 97 % -- --   06/04/24 1325 105/59 -- -- -- -- 98 % -- --   06/04/24 1311 115/64 -- -- -- -- 98 % -- --   06/04/24 1256 120/70 -- -- -- -- -- -- --   06/04/24 1240 107/67 99  F (37.2  C) Temporal 105 16 100 % 1.626 m (5' 4\") 71.7 kg (158 lb)       Physical Exam  Vitals: reviewed by me  General: Pt seen on [facility], cooperative, and alert to conversation  Eyes: Tracking well, clear conjunctiva BL  ENT: MMM, midline trachea.   Lungs: No tachypnea, no accessory muscle use. No respiratory distress.  No wheezing heard on auscultation  CV: Rate as above  Abd: Soft, does have epigastric tenderness to " palpation, no guarding, no rebound.  MSK: no joint effusion.  No evidence of trauma  Skin: No rash  Neuro: Clear speech and no facial droop.  Moving all extremity spontaneously and following all commands  Psych: Not RIS, no e/o AH/VH      Diagnostics   Lab Results   Labs Ordered and Resulted from Time of ED Arrival to Time of ED Departure   BASIC METABOLIC PANEL - Abnormal       Result Value    Sodium 128 (*)     Potassium 4.5      Chloride 96 (*)     Carbon Dioxide (CO2) 22      Anion Gap 10      Urea Nitrogen 12.5      Creatinine 1.20 (*)     GFR Estimate 67      Calcium 8.5 (*)     Glucose 96     HEPATIC FUNCTION PANEL - Abnormal    Protein Total 6.1 (*)     Albumin 3.4 (*)     Bilirubin Total 0.2      Alkaline Phosphatase 53      AST 23      ALT 9      Bilirubin Direct <0.20     CBC WITH PLATELETS AND DIFFERENTIAL - Abnormal    WBC Count 6.7      RBC Count 3.69 (*)     Hemoglobin 11.2 (*)     Hematocrit 34.7 (*)     MCV 94      MCH 30.4      MCHC 32.3      RDW 13.1      Platelet Count 244      % Neutrophils 50      % Lymphocytes 29      % Monocytes 16      % Eosinophils 4      % Basophils 1      % Immature Granulocytes 0      NRBCs per 100 WBC 0      Absolute Neutrophils 3.4      Absolute Lymphocytes 1.9      Absolute Monocytes 1.1      Absolute Eosinophils 0.2      Absolute Basophils 0.0      Absolute Immature Granulocytes 0.0      Absolute NRBCs 0.0     D DIMER QUANTITATIVE - Abnormal    D-Dimer Quantitative 1.84 (*)    INR - Normal    INR 1.07     PARTIAL THROMBOPLASTIN TIME - Normal    aPTT 29     TROPONIN T, HIGH SENSITIVITY - Normal    Troponin T, High Sensitivity 11     LIPASE - Normal    Lipase 29     NT PROBNP INPATIENT - Normal    N terminal Pro BNP Inpatient 407     METHEMOGLOBIN - Normal    Methemoglobin 0.9     CARBON MONOXIDE - Normal    Carbon Monoxide 0.8     GLUCOSE MONITOR NURSING POCT   TROPONIN T, HIGH SENSITIVITY   INFLUENZA A/B, RSV, & SARS-COV2 PCR       Imaging  CT Chest (PE) Abdomen  Pelvis w Contrast   Preliminary Result   IMPRESSION:   1.  No acute process demonstrated.   2.  Cholelithiasis without cholecystitis.      CTA Head Neck with Contrast   Final Result   IMPRESSION:  No evidence of large vessel occlusion or high-grade   stenosis.      TORSTEN SIMPSON MD            SYSTEM ID:  [id number]      CT Head w/o Contrast   Final Result   IMPRESSION:   No acute findings. No evidence of acute intracranial   hemorrhage.         LITZY JOHN MD            SYSTEM ID:  [id number]      MR Brain w/o Contrast    (Results Pending)       EKG   ECG results from 06/04/24   EKG 12-lead, tracing only     Value    Systolic Blood Pressure     Diastolic Blood Pressure     Ventricular Rate 62    Atrial Rate 62    PA Interval 146    QRS Duration 74        QTc 393    P Axis 56    R AXIS 32    T Axis 36    Interpretation ECG      Sinus rhythm  Normal ECG  When compared with ECG of 30-MAY-2024 11:32,  No significant change was found  Interpreted by Chris JOSHI           Independent Interpretation  I have independently reviewed the patient's CT scan of the head, no obvious hemorrhage noted.      ED Course    Medications Administered  Medications   iopamidol (ISOVUE-370) solution 67 mL (67 mLs Intravenous $Given 6/4/24 1252)     And   sodium chloride 0.9 % bag for CT scan flush use (80 mLs As instructed $Given 6/4/24 1259)   alum & mag hydroxide-simethicone (MAALOX) suspension 15 mL (15 mLs Oral $Given 6/4/24 1351)   lidocaine (viscous) (XYLOCAINE) 2 % solution 10 mL (10 mLs Mouth/Throat $Given 6/4/24 1351)   sodium chloride 0.9% BOLUS 1,000 mL (1,000 mLs Intravenous $New Bag 6/4/24 1408)   sodium chloride 0.9% BOLUS 1,000 mL (1,000 mLs Intravenous $New Bag 6/4/24 1446)   ipratropium - albuterol 0.5 mg/2.5 mg/3 mL (DUONEB) neb solution 3 mL (3 mLs Nebulization $Given 6/4/24 1443)   methylPREDNISolone sodium succinate (solu-MEDROL) injection 125 mg (125 mg Intravenous $Given 6/4/24 1447)   iopamidol (ISOVUE-370)  solution 500 mL (80 mLs Intravenous $Given 6/4/24 1531)   CT Scan Flush (88 mLs Intravenous $Given 6/4/24 1531)       Procedures  Procedures     Discussion of Management  Yes I discussed the patient's case with the stroke neurologist, Dr. Lima.  We agreed for an MRI and continued monitoring.    Social Determinants of Health adding to complexity of care  None      Disposition  The patient was admitted to the hospital.       Medical Decision Making / Diagnosis   MIPS     CT for PE was ordered because the patient had an abnormal d-dimer.        MDM  This is a very pleasant 63-year-old male who presents the emergency room initially with left-sided weakness, and concerned about his gastritis for a week.  Given the acute nature of his neurologic symptoms, a code stroke was called and thankfully his imaging is all negative, he is still pending an MRI but he has made a full recovery, and essentially is being worked up for a TIA at this point.  Regarding his abdominal pain, which seems to be bothering him the most, I do not see any clear cause, as his CT scan is unremarkable apart from some gallstones, and his abdomen is benign on my exam.  We are pending his liver function test, unclear whether such a delay, but this will be helpful as well.  He does have a low-grade fever, and we will have a low threshold to start antibiotics.  While the patient was staying and waiting for an MRI, I was alerted by the nurse that he actually was becoming hypoxic, to the point where he needed for 5 L nasal cannula.  This did prompt a CT scan to rule out a PE, which thankfully was ruled out.  No pneumonia, patient doing well on nasal cannula only.  I spoke to the admitting hospitalist and underscored the undifferentiated nature of the patient's hypoxia and how this would need to be further evaluated, and also that the MRI was pending.  Dr. Quevedo was okay with this, and accepted care of the patient, will plan for careful monitoring until  next inpatient bed is available.        ICD-10 Codes:    ICD-10-CM    1. Hypoxia  R09.02       2. Left-sided weakness  R53.1       3. Epigastric pain  R10.13                   Martín Perez MD  06/04/24 1717

## 2024-06-04 NOTE — H&P
[facility]    History and Physical - Hospitalist Service       Date of Admission:  6/4/2024    Assessment & Plan      Luis Yates is a 63 year old male admitted on 6/4/2024 who presents with months of epigastric pain, unintentional weight loss, 10-minute episode of left-sided weakness and numbness, acute episode of hypoxia in the ED which resolved.    Episodic epigastric pain  Unintentional weight loss  H/o atrophic gastritis -patient has been experiencing this for 4 months, is intermittent but increasing in severity, accompanied by symptoms of anorexia, nausea, unintentional weight loss of 10 to 15 pounds.  Last EGD was 2017 which demonstrated atrophic gastritis no evidence for H pylori.  Had complete resolution of pain with GI cocktail in the ED, has been taking PPI over the last week or 2 without improvement.  CT did demonstrate cholelithiasis with no evidence of cholecystitis, FTs were within normal limits.  -N.p.o. at midnight, GI consult tomorrow will determine if patient should have inpatient EGD  -Repeat GI cocktail as needed  -Continue PPI therapy  -Patient does routinely return to [facility], but has been H. pylori negative in the past  -No family or social risk factors for gastric malignancy    10 min episode of left sided weakness/numbness  Concern for CVA -CT imaging without evidence of acute ischemic stroke.  MRI ordered and is pending at this time.  Neurology has been consulted and continues to follow.  Patient denies history of stroke however given concurrent stress could represent locus minorus resistentiae with recrudescence of prior CVA.    -Neurology final recommendations to be determined    Acute hypoxic respiratory failure, resolved -unclear etiology.  Patient desatted to the 70s required supplemental oxygen.  During my interview he continues to sat 95+ in both the upright sitting position as well as lying flat throughout the entire interview.  COVID and viral workup  "was negative.  He had negative stress test in February 2022 with an EF of 70% at that time.  Did get methylprednisolone in the ED however this would be too short of a time to have any impact.  He got 2 L of IV fluids which would argue against any hypervolemic cause.  Extensive workup pursued in the ED which was unrevealing.  CT without evidence of pneumonia, mucous plugging, abrasion, effusion.  -Continue oximetry  -Will make short acting and controller medications which have previous fully been prescribed available    Med Rec -resume PTA medications once med rec completed        Diet: NPO for Medical/Clinical Reasons Except for: No Exceptions    DVT Prophylaxis: Pneumatic Compression Devices  Herman Catheter: Not present  Lines: None     Cardiac Monitoring: None  Code Status:  FULL    Clinically Significant Risk Factors Present on Admission         # Hyponatremia: Lowest Na = 128 mmol/L in last 2 days, will monitor as appropriate      # Hypoalbuminemia: Lowest albumin = 3.4 g/dL at 6/4/2024  1:13 PM, will monitor as appropriate     # Hypertension: Noted on problem list   # Acute Respiratory Failure: Documented O2 saturation < 91%.  Continue supplemental oxygen as needed            # Overweight: Estimated body mass index is 27.12 kg/m  as calculated from the following:    Height as of this encounter: 1.626 m (5' 4\").    Weight as of this encounter: 71.7 kg (158 lb).       # Asthma: noted on problem list        Disposition Plan     Medically Ready for Discharge: Anticipated in 2-4 Days       Martín Quevedo MD  Hospitalist Service  [facility]  Securely message with [employer] (?????) (more info)  Text page via [facility] Paging/Directory     ______________________________________________________________________    Chief Complaint   Epigastric pain    History is obtained from the patient and daughter who is acting as  per patient preference.    History of Present Illness   Luis Yates is a 63 " year old male who with history of atrophic gastritis who presents with 4 months of intermittent and increasingly severe abdominal pain.  He also had a 10-minute episode of left-sided numbness and tingling which spontaneously resolved.  In the ED he was noted to be quite hypoxic of unclear etiology.    Patient reports that he frequently experiences nausea and feels like he will throw up but does not.  The pain is pretty typically occurring at least daily and does not seem to be better or worse with food.  He notes a 10 to 15 pound weight loss which is unintentional over that time period as well.  He continues to have 1 bowel movement at every day or every other day and notes they typically are quite hard and may be sometimes are thinner?  He denies any difficulty with swallowing or sensation of food getting stuck.  He is Notta smoker nor was he previously and only occasionally uses alcohol.  Family history of stomach problems.    [place] from [place] and [place] been [place] the [place] since [place].  In 2017 he had an EGD which showed atrophic gastritis.  Sounds as if his daughter recently got a PPI prescription over the last week or so but per the patient that has not helped at all.  He did get a GI cocktail while in the emergency department and reports that resolved the pain.    As far as the 10-minute episode of left-sided numbness and tingling which spontaneously resolved he reports that that has happened in the past.  He denies history of stroke.  His daughter notes that he was having difficulty with his balance at that time.  Stroke workup was started in the emergency room and thus far has been negative, MRI is pending.    Also while in the emergency department he had an abrupt episode of hypoxia down to the 70s he was symptomatic at that time.  He denies any overt coughing or viral-like syndrome.  Upon my arrival he is satting 100% on 3 L of oxygen.      Past Medical History    Past Medical  History:   Diagnosis Date    Asthma, mild intermittent     Asthma, mild intermittent     GERD (gastroesophageal reflux disease)     Gout     HTN (hypertension)     HTN (hypertension), benign        Past Surgical History   Past Surgical History:   Procedure Laterality Date    ESOPHAGOSCOPY, GASTROSCOPY, DUODENOSCOPY (EGD), COMBINED N/A 7/22/2017    Procedure: COMBINED ESOPHAGOSCOPY, GASTROSCOPY, DUODENOSCOPY (EGD), BIOPSY SINGLE OR MULTIPLE;  COMBINED ESOPHAGOSCOPY, GASTROSCOPY, DUODENOSCOPY with biopsies ;  Surgeon: Darrick Moseley MD;  Location:  GI    HERNIA REPAIR  2010    left     HERNIA REPAIR  2010    LAPAROSCOPIC HERNIORRHAPHY INGUINAL BILATERAL Bilateral 6/1/2017    Procedure: LAPAROSCOPIC HERNIORRHAPHY INGUINAL BILATERAL;  LAPAROSCOPIC HERNIORRHAPHY INGUINAL BILATERAL with mesh;  Surgeon: Pola Barbosa MD;  Location:  OR       Prior to Admission Medications   Prior to Admission Medications   Prescriptions Last Dose Informant Patient Reported? Taking?   albuterol (PROAIR HFA/PROVENTIL HFA/VENTOLIN HFA) 108 (90 Base) MCG/ACT inhaler Past Week at -  No Yes   Sig: Inhale 2 puffs into the lungs every 6 hours as needed for shortness of breath, wheezing or cough   amLODIPine-valsartan (EXFORGE)  MG tablet 6/4/2024 at am  Yes Yes   Sig: Take 1 tablet by mouth daily   atenolol (TENORMIN) 50 MG tablet 6/3/2024 at pm  Yes Yes   Sig: Take 50 mg by mouth at bedtime   fluticasone (ARNUITY ELLIPTA) 100 MCG/ACT inhaler Past Week at -  No Yes   Sig: Inhale 1 puff into the lungs daily   omeprazole (PRILOSEC) 20 MG DR capsule 6/3/2024 at -  No Yes   Sig: Take 1 capsule (20 mg) by mouth daily for 30 days   sulfamethoxazole-trimethoprim (BACTRIM DS) 800-160 MG tablet 6/4/2024 at am  No Yes   Sig: Take 1 tablet by mouth 2 times daily for 10 days      Facility-Administered Medications: None        Review of Systems    The 10 point Review of Systems is negative other than noted in the HPI or here.       Physical Exam   Vital Signs: Temp: 99  F (37.2  C) Temp src: Temporal BP: 101/56 Pulse: 69   Resp: 16 SpO2: 96 % O2 Device: Oxymask Oxygen Delivery: 4 LPM  Weight: 158 lbs 0 oz    General Appearance: Lying in bed, NAD  Respiratory: Clear to auscultation in all lung fields.  No wheezing, no rhonchi.  I stopped the oxygen and patient continued to sat 95+ percent both sitting up and lying completely flat.  Cardiovascular: Regular rate and rhythm, no murmurs rubs or gallops  GI: Bowel sounds are present, abdomen is firm but nontender  Skin: No rashes or lesions  Other: Neuroexam is nonfocal at this time    Medical Decision Making       85 MINUTES SPENT BY ME on the date of service doing chart review, history, exam, documentation & further activities per the note.      Data     I have personally reviewed the following data over the past 24 hrs:    6.7  \   11.2 (L)   / 244     128 (L) 96 (L) 12.5 /  96   4.5 22 1.20 (H) \     ALT: 9 AST: 23 AP: 53 TBILI: 0.2   ALB: 3.4 (L) TOT PROTEIN: 6.1 (L) LIPASE: 29     Trop: 12 BNP: 407     INR:  1.07 PTT:  29   D-dimer:  1.84 (H) Fibrinogen:  N/A       Imaging results reviewed over the past 24 hrs:   Recent Results (from the past 24 hour(s))   CT Head w/o Contrast    Narrative    CT SCAN OF THE HEAD WITHOUT CONTRAST   6/4/2024 12:55 PM     HISTORY: Code Stroke to evaluate for potential thrombolysis and  thrombectomy. PLEASE READ IMMEDIATELY.    TECHNIQUE:  Axial images of the head and coronal reformations without  IV contrast material. Radiation dose for this scan was reduced using  automated exposure control, adjustment of the mA and/or kV according  to patient size, or iterative reconstruction technique.    COMPARISON: 7/16/2017.    FINDINGS: No midline shift or mass effect. No acute intracranial  hemorrhage. No hydrocephalus or extra-axial hemorrhage. Atheromatous  changes involve the intracranial vasculature. Mild to moderate global  cortical volume loss with ex vacuo  dilatation of the ventricular  system. No clear findings of acute loss of gray-white differentiation.  Remote lacunar infarct or slit hemorrhage involving this the left  basal ganglia posterolaterally. No acute calvarial fracture.      Impression    IMPRESSION:   No acute findings. No evidence of acute intracranial  hemorrhage.      LITZY JOHN MD         SYSTEM ID:  [id number]   CTA Head Neck with Contrast    Narrative    CT ANGIOGRAM OF THE HEAD AND NECK WITH CONTRAST  6/4/2024 1:00 PM     HISTORY: Code Stroke. Evaluate for potential thrombolysis and  thrombectomy. Left-sided numbness.    TECHNIQUE:  CT angiography with an injection of 67 mL Isovue-370 IV  with scans through the head and neck. Images were transferred to a  separate 3-D workstation where multiplanar reformations and 3-D images  were created. Estimates of carotid stenoses are made relative to the  distal internal carotid artery diameters except as noted. Radiation  dose for this scan was reduced using automated exposure control,  adjustment of the mA and/or kV according to patient size, or iterative  reconstruction technique.    COMPARISON: CTA of the head and neck 7/12/2017.     CT ANGIOGRAM HEAD FINDINGS:  No evidence of large vessel occlusion,  high-grade stenosis, aneurysm, or high-flow vascular malformation.  Fetal origin of the left posterior cerebral artery.    CT ANGIOGRAM NECK FINDINGS:  No evidence of large vessel occlusion,  high-grade stenosis, or dissection.     INCIDENTAL FINDINGS: Cervical spine degenerative change. Presumed  atelectasis at the lung apices. Dental disease. Presumed cerumen  within the left external auditory canal.      Impression    IMPRESSION:  No evidence of large vessel occlusion or high-grade  stenosis.    TORSTEN SIMPSON MD         SYSTEM ID:  [id number]   CT Chest (PE) Abdomen Pelvis w Contrast    Narrative    CT CHEST PE ABDOMEN AND PELVIS WITH CONTRAST 6/4/2024 3:38 PM    CLINICAL HISTORY: Chest Pain. + D-dimer,  hypoxia, abdominal pain.    TECHNIQUE: CT scan of the chest, abdomen, and pelvis was performed  following injection of IV contrast. Multiplanar reformats were  obtained. Dose reduction techniques were used.     CONTRAST: 80mL Isovue-370    COMPARISON: May 30, 2024    FINDINGS:   ANGIOGRAM CHEST: Pulmonary arteries are normal caliber and negative  for pulmonary emboli. Thoracic aorta is negative for dissection. No CT  evidence of right heart strain.    LUNGS AND PLEURA: 5 mm nodule in the lateral right costophrenic sulcus  is not significantly changed. 3 mm subpleural nodule right lower lobe  seen previously is less well seen due to respiratory motion. No  consolidation or effusion.    MEDIASTINUM/AXILLAE: No lymphadenopathy. No thoracic aortic aneurysms.    CORONARY ARTERY CALCIFICATIONS: Mild.    HEPATOBILIARY: Normal contour with no significant mass. No bile duct  dilatation. Calcified gallstones.    PANCREAS: No significant mass, duct dilatation, or inflammatory  change.    SPLEEN: Normal size.    ADRENAL GLANDS: No significant nodules.    KIDNEYS/BLADDER: No significant mass, stones, or hydronephrosis.  Bladder diverticulum.    BOWEL: No obstruction or inflammatory change.    PELVIC ORGANS: No pelvic masses.    ADDITIONAL FINDINGS: No adenopathy or free fluid. There are mild  atherosclerotic changes of the visualized aorta and its branches.  There is no evidence of aortic dissection or aneurysm.    MUSCULOSKELETAL: No frankly destructive bony lesions.      Impression    IMPRESSION:  1.  No acute process demonstrated.  2.  Cholelithiasis without cholecystitis.

## 2024-06-04 NOTE — ED NOTES
[facility]  ED Nurse Handoff Report    ED Chief complaint: One-sided Weakness  . ED Diagnosis:   Final diagnoses:   Hypoxia   Left-sided weakness   Epigastric pain       Allergies:   Allergies   Allergen Reactions    Lisinopril Cough       Code Status: Full Code    Activity level - Baseline/Home:  independent.  Activity Level - Current:   assist of 1.   Lift room needed: No.   Bariatric: No   Needed: Yes   Isolation: No.   Infection: Not Applicable.     Respiratory status: Oximask    Vital Signs (within 30 minutes):   Vitals:    06/04/24 1509 06/04/24 1616 06/04/24 1617 06/04/24 1627   BP:  95/49  97/48   Pulse:  66  64   Resp:       Temp:       TempSrc:       SpO2: 95%  96% 99%   Weight:       Height:           Cardiac Rhythm:  ,      Pain level:    Patient confused: No.   Patient Falls Risk: patient and family education.   Elimination Status: Has voided     Patient Report - Initial Complaint: One-sided weakness.   Focused Assessment: presents to the emergency room with a 10 and an episode of left arm and left leg weakness to the point where he had a very unstable gait after getting out of the shower today, this was witnessed by his daughter.  He also complains of continued gastritis and upper and lower back pain.  Patient states that the back pain is more concerning to him at this time, and that the numbness and tingling have completely resolved.     Patient states however that it is much more concerning to him that he is still having some epigastric pain that is worse at night and similar to the same pain he had all last week.  He seems less concerned about the numbness and tingling aspect.     Abnormal Results:   Labs Ordered and Resulted from Time of ED Arrival to Time of ED Departure   BASIC METABOLIC PANEL - Abnormal       Result Value    Sodium 128 (*)     Potassium 4.5      Chloride 96 (*)     Carbon Dioxide (CO2) 22      Anion Gap 10      Urea Nitrogen 12.5      Creatinine  1.20 (*)     GFR Estimate 67      Calcium 8.5 (*)     Glucose 96     HEPATIC FUNCTION PANEL - Abnormal    Protein Total 6.1 (*)     Albumin 3.4 (*)     Bilirubin Total 0.2      Alkaline Phosphatase 53      AST 23      ALT 9      Bilirubin Direct <0.20     CBC WITH PLATELETS AND DIFFERENTIAL - Abnormal    WBC Count 6.7      RBC Count 3.69 (*)     Hemoglobin 11.2 (*)     Hematocrit 34.7 (*)     MCV 94      MCH 30.4      MCHC 32.3      RDW 13.1      Platelet Count 244      % Neutrophils 50      % Lymphocytes 29      % Monocytes 16      % Eosinophils 4      % Basophils 1      % Immature Granulocytes 0      NRBCs per 100 WBC 0      Absolute Neutrophils 3.4      Absolute Lymphocytes 1.9      Absolute Monocytes 1.1      Absolute Eosinophils 0.2      Absolute Basophils 0.0      Absolute Immature Granulocytes 0.0      Absolute NRBCs 0.0     D DIMER QUANTITATIVE - Abnormal    D-Dimer Quantitative 1.84 (*)    INR - Normal    INR 1.07     PARTIAL THROMBOPLASTIN TIME - Normal    aPTT 29     TROPONIN T, HIGH SENSITIVITY - Normal    Troponin T, High Sensitivity 11     LIPASE - Normal    Lipase 29     NT PROBNP INPATIENT - Normal    N terminal Pro BNP Inpatient 407     TROPONIN T, HIGH SENSITIVITY - Normal    Troponin T, High Sensitivity 12     METHEMOGLOBIN - Normal    Methemoglobin 0.9     CARBON MONOXIDE - Normal    Carbon Monoxide 0.8     GLUCOSE MONITOR NURSING POCT   INFLUENZA A/B, RSV, & SARS-COV2 PCR   TROPONIN T, HIGH SENSITIVITY        CT Chest (PE) Abdomen Pelvis w Contrast   Preliminary Result   IMPRESSION:   1.  No acute process demonstrated.   2.  Cholelithiasis without cholecystitis.      CTA Head Neck with Contrast   Final Result   IMPRESSION:  No evidence of large vessel occlusion or high-grade   stenosis.      TORSTEN SIMPSON MD            SYSTEM ID:  [id number]      CT Head w/o Contrast   Final Result   IMPRESSION:   No acute findings. No evidence of acute intracranial   hemorrhage.         LITZY JOHN MD             SYSTEM ID:  [id number]      MR Brain w/o Contrast    (Results Pending)       Treatments provided: See MAR  Family Comments: daughter bedside  OBS brochure/video discussed/provided to patient:  N/A  ED Medications:   Medications   iopamidol (ISOVUE-370) solution 67 mL (67 mLs Intravenous $Given 6/4/24 1252)     And   sodium chloride 0.9 % bag for CT scan flush use (80 mLs As instructed $Given 6/4/24 1259)   alum & mag hydroxide-simethicone (MAALOX) suspension 15 mL (15 mLs Oral $Given 6/4/24 1351)   lidocaine (viscous) (XYLOCAINE) 2 % solution 10 mL (10 mLs Mouth/Throat $Given 6/4/24 1351)   sodium chloride 0.9% BOLUS 1,000 mL (1,000 mLs Intravenous $New Bag 6/4/24 1408)   sodium chloride 0.9% BOLUS 1,000 mL (1,000 mLs Intravenous $New Bag 6/4/24 1446)   ipratropium - albuterol 0.5 mg/2.5 mg/3 mL (DUONEB) neb solution 3 mL (3 mLs Nebulization $Given 6/4/24 1443)   methylPREDNISolone sodium succinate (solu-MEDROL) injection 125 mg (125 mg Intravenous $Given 6/4/24 1447)   iopamidol (ISOVUE-370) solution 500 mL (80 mLs Intravenous $Given 6/4/24 1531)   CT Scan Flush (88 mLs Intravenous $Given 6/4/24 1531)       Drips infusing:  No  For the majority of the shift this patient was Green.   Interventions performed were n/a.    Sepsis treatment initiated: No    Cares/treatment/interventions/medications to be completed following ED care: n/a    ED Nurse Name: Brannon Helms RN  5:20 PM    RECEIVING UNIT ED HANDOFF REVIEW    Above ED Nurse Handoff Report was reviewed: Yes  Reviewed by: Priti Roy RN on June 4, 2024 at 6:59 PM   I Jared called the ED to inform them the note was read: Yes

## 2024-06-05 ENCOUNTER — APPOINTMENT (OUTPATIENT)
Dept: CARDIOLOGY | Facility: CLINIC | Age: 64
End: 2024-06-05
Attending: INTERNAL MEDICINE
Payer: COMMERCIAL

## 2024-06-05 LAB
ANION GAP SERPL CALCULATED.3IONS-SCNC: 11 MMOL/L (ref 7–15)
BUN SERPL-MCNC: 21.1 MG/DL (ref 8–23)
CALCIUM SERPL-MCNC: 8.6 MG/DL (ref 8.8–10.2)
CHLORIDE SERPL-SCNC: 100 MMOL/L (ref 98–107)
CREAT SERPL-MCNC: 1.21 MG/DL (ref 0.67–1.17)
DEPRECATED HCO3 PLAS-SCNC: 19 MMOL/L (ref 22–29)
EGFRCR SERPLBLD CKD-EPI 2021: 66 ML/MIN/1.73M2
ERYTHROCYTE [DISTWIDTH] IN BLOOD BY AUTOMATED COUNT: 12.8 % (ref 10–15)
GLUCOSE SERPL-MCNC: 104 MG/DL (ref 70–99)
HCT VFR BLD AUTO: 33.9 % (ref 40–53)
HGB BLD-MCNC: 11.2 G/DL (ref 13.3–17.7)
LVEF ECHO: NORMAL
MCH RBC QN AUTO: 30.8 PG (ref 26.5–33)
MCHC RBC AUTO-ENTMCNC: 33 G/DL (ref 31.5–36.5)
MCV RBC AUTO: 93 FL (ref 78–100)
PLATELET # BLD AUTO: 251 10E3/UL (ref 150–450)
POTASSIUM SERPL-SCNC: 5.6 MMOL/L (ref 3.4–5.3)
POTASSIUM SERPL-SCNC: 5.9 MMOL/L (ref 3.4–5.3)
RBC # BLD AUTO: 3.64 10E6/UL (ref 4.4–5.9)
SODIUM SERPL-SCNC: 130 MMOL/L (ref 135–145)
UPPER GI ENDOSCOPY: NORMAL
WBC # BLD AUTO: 3.3 10E3/UL (ref 4–11)

## 2024-06-05 PROCEDURE — 36415 COLL VENOUS BLD VENIPUNCTURE: CPT | Performed by: HOSPITALIST

## 2024-06-05 PROCEDURE — 0DB78ZX EXCISION OF STOMACH, PYLORUS, VIA NATURAL OR ARTIFICIAL OPENING ENDOSCOPIC, DIAGNOSTIC: ICD-10-PCS | Performed by: INTERNAL MEDICINE

## 2024-06-05 PROCEDURE — 250N000011 HC RX IP 250 OP 636: Performed by: INTERNAL MEDICINE

## 2024-06-05 PROCEDURE — 84132 ASSAY OF SERUM POTASSIUM: CPT | Performed by: INTERNAL MEDICINE

## 2024-06-05 PROCEDURE — 88305 TISSUE EXAM BY PATHOLOGIST: CPT | Mod: 26 | Performed by: PATHOLOGY

## 2024-06-05 PROCEDURE — 43239 EGD BIOPSY SINGLE/MULTIPLE: CPT | Performed by: INTERNAL MEDICINE

## 2024-06-05 PROCEDURE — 88305 TISSUE EXAM BY PATHOLOGIST: CPT | Mod: TC | Performed by: INTERNAL MEDICINE

## 2024-06-05 PROCEDURE — 0DB68ZX EXCISION OF STOMACH, VIA NATURAL OR ARTIFICIAL OPENING ENDOSCOPIC, DIAGNOSTIC: ICD-10-PCS | Performed by: INTERNAL MEDICINE

## 2024-06-05 PROCEDURE — 93306 TTE W/DOPPLER COMPLETE: CPT | Mod: 26 | Performed by: INTERNAL MEDICINE

## 2024-06-05 PROCEDURE — 80048 BASIC METABOLIC PNL TOTAL CA: CPT | Performed by: HOSPITALIST

## 2024-06-05 PROCEDURE — 99233 SBSQ HOSP IP/OBS HIGH 50: CPT | Performed by: INTERNAL MEDICINE

## 2024-06-05 PROCEDURE — 258N000003 HC RX IP 258 OP 636: Performed by: INTERNAL MEDICINE

## 2024-06-05 PROCEDURE — 999N000099 HC STATISTIC MODERATE SEDATION < 10 MIN: Performed by: INTERNAL MEDICINE

## 2024-06-05 PROCEDURE — 250N000013 HC RX MED GY IP 250 OP 250 PS 637: Performed by: HOSPITALIST

## 2024-06-05 PROCEDURE — 120N000001 HC R&B MED SURG/OB

## 2024-06-05 PROCEDURE — 88342 IMHCHEM/IMCYTCHM 1ST ANTB: CPT | Mod: 26 | Performed by: PATHOLOGY

## 2024-06-05 PROCEDURE — 85027 COMPLETE CBC AUTOMATED: CPT | Performed by: HOSPITALIST

## 2024-06-05 PROCEDURE — 93306 TTE W/DOPPLER COMPLETE: CPT

## 2024-06-05 PROCEDURE — 36415 COLL VENOUS BLD VENIPUNCTURE: CPT | Performed by: INTERNAL MEDICINE

## 2024-06-05 RX ORDER — NALOXONE HYDROCHLORIDE 0.4 MG/ML
0.4 INJECTION, SOLUTION INTRAMUSCULAR; INTRAVENOUS; SUBCUTANEOUS
Status: DISCONTINUED | OUTPATIENT
Start: 2024-06-05 | End: 2024-06-05 | Stop reason: HOSPADM

## 2024-06-05 RX ORDER — NALOXONE HYDROCHLORIDE 0.4 MG/ML
0.2 INJECTION, SOLUTION INTRAMUSCULAR; INTRAVENOUS; SUBCUTANEOUS
Status: DISCONTINUED | OUTPATIENT
Start: 2024-06-05 | End: 2024-06-06 | Stop reason: HOSPADM

## 2024-06-05 RX ORDER — NALOXONE HYDROCHLORIDE 0.4 MG/ML
0.2 INJECTION, SOLUTION INTRAMUSCULAR; INTRAVENOUS; SUBCUTANEOUS
Status: DISCONTINUED | OUTPATIENT
Start: 2024-06-05 | End: 2024-06-05 | Stop reason: HOSPADM

## 2024-06-05 RX ORDER — FLUMAZENIL 0.1 MG/ML
0.2 INJECTION, SOLUTION INTRAVENOUS
Status: DISCONTINUED | OUTPATIENT
Start: 2024-06-05 | End: 2024-06-05 | Stop reason: HOSPADM

## 2024-06-05 RX ORDER — DIPHENHYDRAMINE HYDROCHLORIDE 50 MG/ML
25-50 INJECTION INTRAMUSCULAR; INTRAVENOUS
Status: DISCONTINUED | OUTPATIENT
Start: 2024-06-05 | End: 2024-06-05 | Stop reason: HOSPADM

## 2024-06-05 RX ORDER — ATROPINE SULFATE 0.1 MG/ML
1 INJECTION INTRAVENOUS
Status: DISCONTINUED | OUTPATIENT
Start: 2024-06-05 | End: 2024-06-05 | Stop reason: HOSPADM

## 2024-06-05 RX ORDER — SODIUM CHLORIDE 9 MG/ML
INJECTION, SOLUTION INTRAVENOUS CONTINUOUS
Status: DISCONTINUED | OUTPATIENT
Start: 2024-06-05 | End: 2024-06-06

## 2024-06-05 RX ORDER — FENTANYL CITRATE 50 UG/ML
50-100 INJECTION, SOLUTION INTRAMUSCULAR; INTRAVENOUS EVERY 5 MIN PRN
Status: DISCONTINUED | OUTPATIENT
Start: 2024-06-05 | End: 2024-06-05 | Stop reason: HOSPADM

## 2024-06-05 RX ORDER — NALOXONE HYDROCHLORIDE 0.4 MG/ML
0.4 INJECTION, SOLUTION INTRAMUSCULAR; INTRAVENOUS; SUBCUTANEOUS
Status: DISCONTINUED | OUTPATIENT
Start: 2024-06-05 | End: 2024-06-06 | Stop reason: HOSPADM

## 2024-06-05 RX ORDER — SIMETHICONE 40MG/0.6ML
133 SUSPENSION, DROPS(FINAL DOSAGE FORM)(ML) ORAL
Status: DISCONTINUED | OUTPATIENT
Start: 2024-06-05 | End: 2024-06-05 | Stop reason: HOSPADM

## 2024-06-05 RX ORDER — LIDOCAINE 40 MG/G
CREAM TOPICAL
Status: DISCONTINUED | OUTPATIENT
Start: 2024-06-05 | End: 2024-06-05 | Stop reason: HOSPADM

## 2024-06-05 RX ORDER — FLUMAZENIL 0.1 MG/ML
0.2 INJECTION, SOLUTION INTRAVENOUS
Status: ACTIVE | OUTPATIENT
Start: 2024-06-05 | End: 2024-06-06

## 2024-06-05 RX ORDER — EPINEPHRINE 1 MG/ML
0.1 INJECTION, SOLUTION INTRAMUSCULAR; SUBCUTANEOUS
Status: DISCONTINUED | OUTPATIENT
Start: 2024-06-05 | End: 2024-06-05 | Stop reason: HOSPADM

## 2024-06-05 RX ADMIN — PANTOPRAZOLE SODIUM 40 MG: 40 TABLET, DELAYED RELEASE ORAL at 09:45

## 2024-06-05 RX ADMIN — FLUTICASONE FUROATE 1 PUFF: 100 POWDER RESPIRATORY (INHALATION) at 09:54

## 2024-06-05 RX ADMIN — MIDAZOLAM 2 MG: 1 INJECTION INTRAMUSCULAR; INTRAVENOUS at 12:56

## 2024-06-05 RX ADMIN — SODIUM CHLORIDE: 9 INJECTION, SOLUTION INTRAVENOUS at 09:53

## 2024-06-05 RX ADMIN — FENTANYL CITRATE 100 MCG: 50 INJECTION, SOLUTION INTRAMUSCULAR; INTRAVENOUS at 12:57

## 2024-06-05 RX ADMIN — SODIUM CHLORIDE: 9 INJECTION, SOLUTION INTRAVENOUS at 19:34

## 2024-06-05 ASSESSMENT — ACTIVITIES OF DAILY LIVING (ADL)
ADLS_ACUITY_SCORE: 22
ADLS_ACUITY_SCORE: 30
ADLS_ACUITY_SCORE: 19
ADLS_ACUITY_SCORE: 30
ADLS_ACUITY_SCORE: 30
ADLS_ACUITY_SCORE: 19
ADLS_ACUITY_SCORE: 26
ADLS_ACUITY_SCORE: 19
ADLS_ACUITY_SCORE: 30
ADLS_ACUITY_SCORE: 19
ADLS_ACUITY_SCORE: 30
ADLS_ACUITY_SCORE: 26
ADLS_ACUITY_SCORE: 30
ADLS_ACUITY_SCORE: 19
ADLS_ACUITY_SCORE: 19

## 2024-06-05 NOTE — PLAN OF CARE
"A&Ox4. Denies pain,sob, or chest pain. Assist 1. Neuro intact.     Problem: Adult Inpatient Plan of Care  Goal: Plan of Care Review  Description: The Plan of Care Review/Shift note should be completed every shift.  The Outcome Evaluation is a brief statement about your assessment that the patient is improving, declining, or no change.  This information will be displayed automatically on your shift  note.  Outcome: Progressing  Flowsheets (Taken 6/5/2024 0033)  Plan of Care Reviewed With: patient  Overall Patient Progress: no change  Goal: Patient-Specific Goal (Individualized)  Description: You can add care plan individualizations to a care plan. Examples of Individualization might be:  \"Parent requests to be called daily at 9am for status\", \"I have a hard time hearing out of my right ear\", or \"Do not touch me to wake me up as it startles  me\".  Outcome: Progressing  Goal: Absence of Hospital-Acquired Illness or Injury  Outcome: Progressing  Intervention: Identify and Manage Fall Risk  Recent Flowsheet Documentation  Taken 6/4/2024 2013 by Dayan Meneses, RN  Safety Promotion/Fall Prevention:   clutter free environment maintained   safety round/check completed  Intervention: Prevent Skin Injury  Recent Flowsheet Documentation  Taken 6/4/2024 2013 by Dayan Meneses, RN  Body Position: position changed independently  Intervention: Prevent and Manage VTE (Venous Thromboembolism) Risk  Recent Flowsheet Documentation  Taken 6/4/2024 2013 by Dayan Meneses, RN  VTE Prevention/Management: SCDs (sequential compression devices) off  Goal: Optimal Comfort and Wellbeing  Outcome: Progressing  Goal: Readiness for Transition of Care  Outcome: Progressing  Intervention: Mutually Develop Transition Plan  Recent Flowsheet Documentation  Taken 6/4/2024 2000 by Dayan Meneses, RN  Equipment Currently Used at Home: none     Problem: Fall Injury Risk  Goal: Absence of Fall and Fall-Related Injury  Outcome: " Progressing  Intervention: Promote Injury-Free Environment  Recent Flowsheet Documentation  Taken 6/4/2024 2013 by Dayan Meneses, RN  Safety Promotion/Fall Prevention:   clutter free environment maintained   safety round/check completed     Problem: Pain Acute  Goal: Optimal Pain Control and Function  Outcome: Progressing

## 2024-06-05 NOTE — PROGRESS NOTES
MRI negative for stroke. Discussed with vascular neurologist, Dr. Lima, do not suspect TIA. No further stroke work-up needed. Please contact us with any concerns.

## 2024-06-05 NOTE — UTILIZATION REVIEW
"    Admission Status; Secondary Review Determination         Under the authority of the Utilization Management Committee, the utilization review process indicated a secondary review on the above patient.  The review outcome is based on review of the medical records, discussions with staff, and applying clinical experience noted on the date of the review.        (X)      Inpatient Status Appropriate - This patient's medical care is consistent with medical management for inpatient care and reasonable inpatient medical practice.      () Observation Status Appropriate - This patient does not meet hospital inpatient criteria and is placed in observation status. If this patient's primary payer is Medicare and was admitted as an inpatient, Condition Code 44 should be used and patient status changed to \"observation\".   () Admission Status NOT Appropriate - This patient's medical care is not consistent with medical management for Inpatient or Observation Status.          RATIONALE FOR DETERMINATION     \"Luis Yates is a 63 year old male admitted on 6/4/2024 who presents with months of epigastric pain, unintentional weight loss, 10-minute episode of left-sided weakness and numbness, acute episode of hypoxia in the ED which resolved.\"    Pt has epigastric pain and unintentional weight loss and GI was consulted and hence they plan on doing EGD. The patient has also other ongoing active issues such as hyperkalemia.    Given further diagnostics such as EGD and treatment of hyperkalemia, the patient is likely to stay > 2 MN and inpatient status is appropriate.         The severity of illness, intensity of service provided, expected LOS and risk for adverse outcome make the care complex, high risk and appropriate for hospital admission.        The information on this document is developed by the utilization review team in order for the business office to ensure compliance.  This only denotes the appropriateness of proper " admission status and does not reflect the quality of care rendered.         The definitions of Inpatient Status and Observation Status used in making the determination above are those provided in the CMS Coverage Manual, Chapter 1 and Chapter 6, section 70.4.      Sincerely,     MILES FREITAS MD    Physician Advisor  Utilization Review/ Case Management  [facility].

## 2024-06-05 NOTE — CONSULTS
GI CONSULT NOTE      Name: Luis Yates  Medical Record #: [id number]  YOB: 1960  Date of Admission: 6/4/2024  Date/Time: 6/5/2024/10:20 AM     CHIEF COMPLAINT: epigastric pain, unintentional weight loss      HISTORY OF PRESENT ILLNESS: We were asked to see Luis Yates by Martín Quevedo MD for epigastric pain. Luis Yates is a 63 year old year old male with history of atrophic gastritis who presented to the ED for evaluation of increasingly severe epigastric abdominal pain.     Pain has been intermittent for the last 4 months and increasing in severity. It is located in the mid epigastrium and does not radiate. Does not get better or worsen with food. He denies dysphagia or odynophagia. Patient does not use NSAIDs. Patient experiences frequent nausea, no vomiting. No melena or hematochezia. Bowel movements are typically every other day and can sometimes be hard. He has been taking a PPI for the last week or so with no improvement in pain. GI cocktail in the ED resolved pain.     LFTs wnl. Lipase wnl. CBC with low hgb of 11.2, MCV 94. BUN 12.5. CT CAP showed calcified gallstones and was otherwise unremarkable.     Previous EGD completed July 2017 revealed normal esophagus, erythematous mucosa, erythematous duodenopathy, otherwise normal. Pathology showed gastric body type mucosa patchy mild chronic inflammation/gastritis, negative H pylori, intestinal metaplasia c/w atrophic gastritis negative for dysplasia or malignancy.       REVIEW OF SYSTEMS (ROS): Complete review of systems negative other than listed in HPI.    PAST MEDICAL HISTORY:  Past Medical History:   Diagnosis Date    Asthma, mild intermittent     Asthma, mild intermittent     GERD (gastroesophageal reflux disease)     Gout     HTN (hypertension)     HTN (hypertension), benign         FAMILY HISTORY:  Family History   Problem Relation Age of Onset    Family History Negative Mother     Unknown/Adopted Father        SOCIAL  HISTORY:  Social History     Socioeconomic History    Marital status:      Spouse name: Not on file    Number of children: Not on file    Years of education: Not on file    Highest education level: Not on file   Occupational History    Not on file   Tobacco Use    Smoking status: Never    Smokeless tobacco: Never   Vaping Use    Vaping status: Never Used   Substance and Sexual Activity    Alcohol use: No    Drug use: No    Sexual activity: Never   Other Topics Concern    Parent/sibling w/ CABG, MI or angioplasty before 65F 55M? Not Asked   Social History Narrative    ** Merged History Encounter **          Social Determinants of Health     Financial Resource Strain: Low Risk  (5/15/2024)    Financial Resource Strain     Within the past 12 months, have you or your family members you live with been unable to get utilities (heat, electricity) when it was really needed?: No   Food Insecurity: Low Risk  (5/15/2024)    Food Insecurity     Within the past 12 months, did you worry that your food would run out before you got money to buy more?: No     Within the past 12 months, did the food you bought just not last and you didn t have money to get more?: No   Transportation Needs: Low Risk  (5/15/2024)    Transportation Needs     Within the past 12 months, has lack of transportation kept you from medical appointments, getting your medicines, non-medical meetings or appointments, work, or from getting things that you need?: No   Physical Activity: Unknown (5/15/2024)    Exercise Vital Sign     Days of Exercise per Week: 7 days     Minutes of Exercise per Session: Not on file   Stress: No Stress Concern Present (5/15/2024)    [employer] - Occupational Stress Questionnaire     Feeling of Stress : Not at all   Social Connections: Socially Integrated (5/15/2024)    Social Connection and Isolation Panel [NHANES]     Frequency of Communication with Friends and Family: More than three times a  "week     Frequency of Social Gatherings with Friends and Family: Once a week     Attends Rastafari Services: 1 to 4 times per year     Active Member of Clubs or Organizations: No     Attends Club or Organization Meetings: 1 to 4 times per year     Marital Status:    Interpersonal Safety: Not on file   Housing Stability: High Risk (5/15/2024)    Housing Stability     Do you have housing? : No     Are you worried about losing your housing?: No       MEDICATIONS PRIOR TO ADMISSION:   Medications Prior to Admission   Medication Sig Dispense Refill Last Dose    albuterol (PROAIR HFA/PROVENTIL HFA/VENTOLIN HFA) 108 (90 Base) MCG/ACT inhaler Inhale 2 puffs into the lungs every 6 hours as needed for shortness of breath, wheezing or cough 18 g 2 Past Week at -    amLODIPine-valsartan (EXFORGE)  MG tablet Take 1 tablet by mouth daily   6/4/2024 at am    atenolol (TENORMIN) 50 MG tablet Take 50 mg by mouth at bedtime   6/3/2024 at pm    fluticasone (ARNUITY ELLIPTA) 100 MCG/ACT inhaler Inhale 1 puff into the lungs daily 3 each 3 Past Week at -    omeprazole (PRILOSEC) 20 MG DR capsule Take 1 capsule (20 mg) by mouth daily for 30 days 30 capsule 0 6/3/2024 at -    sulfamethoxazole-trimethoprim (BACTRIM DS) 800-160 MG tablet Take 1 tablet by mouth 2 times daily for 10 days 20 tablet 0 6/4/2024 at am          ALLERGIES: Lisinopril    PHYSICAL EXAM:    /51 (BP Location: Right arm, Patient Position: Semi-Mendieta's, Cuff Size: Adult Regular)   Pulse 68   Temp 98.4  F (36.9  C) (Oral)   Resp 16   Ht 1.651 m (5' 5\")   Wt 70.2 kg (154 lb 12.2 oz)   SpO2 98%   BMI 25.75 kg/m      GENERAL: Pleasant, no obvious distress  NECK: Supple without adenopathy  EYES: No scleral icterus  ABDOMEN: Non-distended. Soft, tenderness in epigastric region, otherwise benign abdominal exam.  MUSKULOSKELETAL:  Warm and well perfused  NEUROLOGIC: Alert and oriented  PSYCHIATRIC: Normal affect    LAB DATA:  CMP Results:   Recent Labs "   Lab Test 24  0603 24  1313 24  1411 24  1238 05/15/24  1623 17  1215   * 128*  --  141   < > 143   POTASSIUM 5.6* 4.5 4.7 5.8*   < > 3.5   CHLORIDE 100 96*  --  103   < > 107   CO2 19* 22  --  27   < > 33*   ANIONGAP 11 10  --  11   < > 3   * 96  --  105*   < > 91   BUN 21.1 12.5  --  10.5   < > 4*   CR 1.21* 1.20*  --  0.85   < > 0.66   BILITOTAL  --  0.2  --  0.3  --  0.5   ALKPHOS  --  53  --  63  --  55   ALT  --  9  --  17  --  17   AST  --  23  --  22  --  14    < > = values in this interval not displayed.      CBC  Recent Labs   Lab 24  0603 24  1313 24  1238   WBC 3.3* 6.7 7.3   RBC 3.64* 3.69* 4.22*   HGB 11.2* 11.2* 13.0*   HCT 33.9* 34.7* 40.5   MCV 93 94 96   MCH 30.8 30.4 30.8   MCHC 33.0 32.3 32.1   RDW 12.8 13.1 13.2    244 284     INR  Recent Labs   Lab 24  1313   INR 1.07      Lipase   Date Value Ref Range Status   2024 29 13 - 60 U/L Final   2017 192 73 - 393 U/L Final   2017 240 73 - 393 U/L Final   2017 195 73 - 393 U/L Final       IMAGIN2024 CT CAP w/contrast  HEPATOBILIARY: Normal contour with no significant mass. No bile duct  dilatation. Calcified gallstones.  IMPRESSION:  1.  No acute process demonstrated.  2.  Cholelithiasis without cholecystitis.    ASSESSMENT:  63 year old male with history of atrophic gastritis who presents with 4 months of intermittent and increasingly severe epigastric abdominal pain.     Our differential includes GERD, functional dyspepsia, H pylori, gastritis, or esophagitis. Patient has a history of atrophic gastritis noted on EGD in 2017 but this is unlikely to cause pain. Lipase within normal and no CT findings to suggest pancreatitis. Gallstones noted on CT scan with no signs of cholecystitis and normal LFTs.     Will consider EGD inpatient to evaluate for peptic ulcer disease, gastritis or esophagitis. Patient endorses unintentional weight loss of about 15  pounds over last few months so EGD will also assess for malignancy.     PLAN:  1. Epigastric abdominal pain  - Keep NPO for possible EGD. Will discuss with Dr. Pinto on timing.   - If upper endoscopy normal, consider RUQ ultrasound to assess for cholecystitis   - Continue IV PPI therapy       Will discuss patient findings and plan with Dr. Pinto.     TIME SPENT: 40 min including chart review, patient interview and care coordination.                                                Cecilia Gilmore PA-C  Thank you for the opportunity to participate in the care of this patient.   Please feel free to call me with any questions or concerns.  Phone number [phone].

## 2024-06-05 NOTE — PLAN OF CARE
"To Do:  End of Shift Summary  For vital signs and complete assessments, please see documentation flowsheets.     Pertinent assessments: Pt is A/Ox4. VSS on RA. Speaks Khmer, family in room to translate. Denies SOB, N/V, and pain. NS 100ml/hr infusing. NPO. Tele. Neuros intact.    Major Shift Events: EGD today.    Treatment Plan: IVF. Pain and symptom management. Gastro and SW are following. Neuro checks Q4.    Bedside Nurse: Marsha Prado RN     Goal Outcome Evaluation:      Plan of Care Reviewed With: patient    Overall Patient Progress: no changeOverall Patient Progress: no change    Outcome Evaluation: EGD. NPO      Problem: Adult Inpatient Plan of Care  Goal: Plan of Care Review  Description: The Plan of Care Review/Shift note should be completed every shift.  The Outcome Evaluation is a brief statement about your assessment that the patient is improving, declining, or no change.  This information will be displayed automatically on your shift  note.  6/5/2024 1512 by Marsha Prado RN  Outcome: Progressing  Flowsheets (Taken 6/5/2024 1512)  Outcome Evaluation: EGD. NPO  Plan of Care Reviewed With: patient  6/5/2024 1509 by Marsha Prado RN  Outcome: Progressing  Flowsheets (Taken 6/5/2024 1509)  Outcome Evaluation: EGD. NPO.  Plan of Care Reviewed With: patient  Overall Patient Progress: no change  Goal: Patient-Specific Goal (Individualized)  Description: You can add care plan individualizations to a care plan. Examples of Individualization might be:  \"Parent requests to be called daily at 9am for status\", \"I have a hard time hearing out of my right ear\", or \"Do not touch me to wake me up as it startles  me\".  6/5/2024 1512 by Marsha Prado RN  Outcome: Progressing  6/5/2024 1509 by Marsha Prado RN  Outcome: Progressing  Goal: Absence of Hospital-Acquired Illness or Injury  6/5/2024 1512 by Marsha Prado RN  Outcome: Progressing  6/5/2024 1509 by Marsha Prado RN  Outcome: Progressing  Intervention: Identify " and Manage Fall Risk  Recent Flowsheet Documentation  Taken 6/5/2024 0956 by Marsha Prado RN  Safety Promotion/Fall Prevention:   clutter free environment maintained   nonskid shoes/slippers when out of bed   mobility aid in reach   room near nurse's station   safety round/check completed  Intervention: Prevent Skin Injury  Recent Flowsheet Documentation  Taken 6/5/2024 0956 by Marsha Prado RN  Body Position: position changed independently  Intervention: Prevent and Manage VTE (Venous Thromboembolism) Risk  Recent Flowsheet Documentation  Taken 6/5/2024 0956 by Marsha Prado RN  VTE Prevention/Management: SCDs (sequential compression devices) off  Intervention: Prevent Infection  Recent Flowsheet Documentation  Taken 6/5/2024 0956 by Marsha Prado RN  Infection Prevention:   hand hygiene promoted   rest/sleep promoted   single patient room provided  Goal: Optimal Comfort and Wellbeing  6/5/2024 1512 by Marsha Prado RN  Outcome: Progressing  6/5/2024 1509 by Marsha Prado RN  Outcome: Progressing  Goal: Readiness for Transition of Care  6/5/2024 1512 by Marsha Prado RN  Outcome: Progressing  6/5/2024 1509 by Marsha Prado RN  Outcome: Progressing     Problem: Fall Injury Risk  Goal: Absence of Fall and Fall-Related Injury  6/5/2024 1512 by Marsha Prado RN  Outcome: Progressing  6/5/2024 1509 by Marsha Prado RN  Outcome: Progressing  Intervention: Identify and Manage Contributors  Recent Flowsheet Documentation  Taken 6/5/2024 0956 by Marsha Prado RN  Medication Review/Management: medications reviewed  Intervention: Promote Injury-Free Environment  Recent Flowsheet Documentation  Taken 6/5/2024 0956 by Marsha Prado RN  Safety Promotion/Fall Prevention:   clutter free environment maintained   nonskid shoes/slippers when out of bed   mobility aid in reach   room near nurse's station   safety round/check completed     Problem: Pain Acute  Goal: Optimal Pain Control and Function  6/5/2024 1512 by Marsha Prado  RN  Outcome: Progressing  6/5/2024 1509 by Marsha Prado, RN  Outcome: Progressing  Intervention: Prevent or Manage Pain  Recent Flowsheet Documentation  Taken 6/5/2024 0962 by Marsha Prado, RN  Medication Review/Management: medications reviewed

## 2024-06-05 NOTE — PROGRESS NOTES
[facility]    Medicine Progress Note - Hospitalist Service    Date of Admission:  6/4/2024    Assessment & Plan      Luis Yates is a 63 year old male admitted on 6/4/2024 who presents with months of epigastric pain, unintentional weight loss, 10-minute episode of left-sided weakness and numbness, acute episode of hypoxia in the ED which resolved.    Episodic epigastric pain  Unintentional weight loss  H/o atrophic gastritis   -patient has been experiencing this for 4 months, is intermittent but increasing in severity, accompanied by symptoms of anorexia, nausea, unintentional weight loss of 10 to 15 pounds.  Last EGD was 2017 which demonstrated atrophic gastritis no evidence for H pylori.  Had complete resolution of pain with GI cocktail in the ED, has been taking PPI over the last week or 2 without improvement.  CT did demonstrate cholelithiasis with no evidence of cholecystitis, FTs were within normal limits.  -Highly appreciate prompt input from GI service  -Possible EGD being contemplated  -Earlier CT of the chest abdomen pelvis showed no acute pathology except for findings of cholelithiasis  -May pursue further imaging such as right upper quadrant ultrasound if with persistent symptoms    -Continue PPI therapy  -Patient does routinely return to [facility], but has been H. pylori negative in the past    10 min episode of left sided weakness/numbness  -Ruled out for CVA    -Earlier MRI showed reassuring results  -Highly appreciate input from stroke neurology and apparently signed off the case    Hyponatremia  Hyperkalemia  RELL with creatinine at 1.2    -Ongoing RELL might be related to decreased oral intake and concurrent use of diuretics.  -Telemonitoring with his hyperkalemia  -Potential etiology might be his ARB use.  Repeat serum potassium levels  -Check for echocardiogram as well the setting of earlier hypoxia  -IV fluid support with his RELL and n.p.o. status      Acute hypoxic  "respiratory failure, resolved -unclear etiology.   - Patient desatted to the 70s required supplemental oxygen.  During my interview he continues to sat 95+ in both the upright sitting position as well as lying flat throughout the entire interview.  COVID and viral workup was negative.    -He had negative stress test in February 2022 with an EF of 70% at that time.  Did get methylprednisolone in the ED however this would be too short of a time to have any impact.    He got 2 L of IV fluids which would argue against any hypervolemic cause.    Extensive workup pursued in the ED which was unrevealing.  CT without evidence of pneumonia, mucous plugging, abrasion, effusion.  -Continue oximetry  -Will make short acting and controller medications which have previous fully been prescribed available            Diet: NPO for Medical/Clinical Reasons Except for: Meds, Ice Chips    DVT Prophylaxis: Pneumatic Compression Devices and Ambulate every shift  Herman Catheter: Not present  Lines: None     Cardiac Monitoring: ACTIVE order. Indication: Electrolyte Imbalance (24 hours)- Magnesium <1.3 mg/ml; Potassium < =2.8 or > 5.5 mg/ml  Code Status: Full Code      Clinically Significant Risk Factors Present on Admission        # Hyperkalemia: Highest K = 5.6 mmol/L in last 2 days, will monitor as appropriate  # Hyponatremia: Lowest Na = 128 mmol/L in last 2 days, will monitor as appropriate      # Hypoalbuminemia: Lowest albumin = 3.4 g/dL at 6/4/2024  1:13 PM, will monitor as appropriate     # Hypertension: Noted on problem list   # Acute Respiratory Failure: Documented O2 saturation < 91%.  Continue supplemental oxygen as needed            # Overweight: Estimated body mass index is 25.75 kg/m  as calculated from the following:    Height as of this encounter: 1.651 m (5' 5\").    Weight as of this encounter: 70.2 kg (154 lb 12.2 oz).       # Asthma: noted on problem list        Disposition Plan     Medically Ready for Discharge: " Anticipated in 2-4 Days             Tigre John MD, MD  Hospitalist Service  [facility]  Securely message with [employer] (more info)  Text page via [facility] Paging/Directory   ______________________________________________________________________    Interval History   I assumed medicine service care today.  Chart reviewed.  Seen and examined.  Case discussed with nursing service.  Family updated as patient's daughter present at bedside.  He is endorsing no ongoing nausea or vomiting.  No reported bleeding tendencies either.  Continues to demonstrate stable hemodynamics.  No focal weakness nor slurring of speech.  Denies any headaches, mental status changes.  No reports of any chest pain or shortness of breath.  Currently not requiring oxygen support.  Remained afebrile.    Physical Exam   Vital Signs: Temp: 98.4  F (36.9  C) Temp src: Oral BP: 107/51 Pulse: 68   Resp: 16 SpO2: 98 % O2 Device: Nasal cannula Oxygen Delivery: 1 LPM  Weight: 154 lbs 12.21 oz    HEENT; Atraumatic, normocephalic, pinkish conjuctiva, pupils bilateral reactive   Skin: warm and moist, no rashes  Lungs: equal chest expansion, clear to auscultation, no wheezes, no stridor, no crackles,   Heart: normal rate, normal rhythm, no rubs or gallops.   Abdomen: normal bowel sounds, no tenderness, no peritoneal signs, no guarding  Extremities: no deformities, no edema   Neuro; follow commands, alert and oriented x3, spontaneous speech, coherent, moves all extremities spontaneously  Psych; no hallucination, euthymic mood, not agitated      Medical Decision Making       50 MINUTES SPENT BY ME on the date of service doing chart review, history, exam, documentation & further activities per the note.  MANAGEMENT DISCUSSED with the following over the past 24 hours: Yes   NOTE(S)/MEDICAL RECORDS REVIEWED over the past 24 hours: Yes       Data     I have personally reviewed the following data over the past 24 hrs:    3.3 (L)  \   11.2  (L)   / 251     130 (L) 100 21.1 /  104 (H)   5.6 (H) 19 (L) 1.21 (H) \     ALT: 9 AST: 23 AP: 53 TBILI: 0.2   ALB: 3.4 (L) TOT PROTEIN: 6.1 (L) LIPASE: 29     Trop: 12 BNP: 407     INR:  1.07 PTT:  29   D-dimer:  1.84 (H) Fibrinogen:  N/A       Imaging results reviewed over the past 24 hrs:   Recent Results (from the past 24 hour(s))   CT Head w/o Contrast    Narrative    CT SCAN OF THE HEAD WITHOUT CONTRAST   6/4/2024 12:55 PM     HISTORY: Code Stroke to evaluate for potential thrombolysis and  thrombectomy. PLEASE READ IMMEDIATELY.    TECHNIQUE:  Axial images of the head and coronal reformations without  IV contrast material. Radiation dose for this scan was reduced using  automated exposure control, adjustment of the mA and/or kV according  to patient size, or iterative reconstruction technique.    COMPARISON: 7/16/2017.    FINDINGS: No midline shift or mass effect. No acute intracranial  hemorrhage. No hydrocephalus or extra-axial hemorrhage. Atheromatous  changes involve the intracranial vasculature. Mild to moderate global  cortical volume loss with ex vacuo dilatation of the ventricular  system. No clear findings of acute loss of gray-white differentiation.  Remote lacunar infarct or slit hemorrhage involving this the left  basal ganglia posterolaterally. No acute calvarial fracture.      Impression    IMPRESSION:   No acute findings. No evidence of acute intracranial  hemorrhage.      LITZY JOHN MD         SYSTEM ID:  [id number]   CTA Head Neck with Contrast    Narrative    CT ANGIOGRAM OF THE HEAD AND NECK WITH CONTRAST  6/4/2024 1:00 PM     HISTORY: Code Stroke. Evaluate for potential thrombolysis and  thrombectomy. Left-sided numbness.    TECHNIQUE:  CT angiography with an injection of 67 mL Isovue-370 IV  with scans through the head and neck. Images were transferred to a  separate 3-D workstation where multiplanar reformations and 3-D images  were created. Estimates of carotid stenoses are made relative  to the  distal internal carotid artery diameters except as noted. Radiation  dose for this scan was reduced using automated exposure control,  adjustment of the mA and/or kV according to patient size, or iterative  reconstruction technique.    COMPARISON: CTA of the head and neck 7/12/2017.     CT ANGIOGRAM HEAD FINDINGS:  No evidence of large vessel occlusion,  high-grade stenosis, aneurysm, or high-flow vascular malformation.  Fetal origin of the left posterior cerebral artery.    CT ANGIOGRAM NECK FINDINGS:  No evidence of large vessel occlusion,  high-grade stenosis, or dissection.     INCIDENTAL FINDINGS: Cervical spine degenerative change. Presumed  atelectasis at the lung apices. Dental disease. Presumed cerumen  within the left external auditory canal.      Impression    IMPRESSION:  No evidence of large vessel occlusion or high-grade  stenosis.    TORSTEN SIMPSON MD         SYSTEM ID:  [id number]   CT Chest (PE) Abdomen Pelvis w Contrast    Narrative    CT CHEST PE ABDOMEN AND PELVIS WITH CONTRAST 6/4/2024 3:38 PM    CLINICAL HISTORY: Chest Pain. + D-dimer, hypoxia, abdominal pain.    TECHNIQUE: CT scan of the chest, abdomen, and pelvis was performed  following injection of IV contrast. Multiplanar reformats were  obtained. Dose reduction techniques were used.     CONTRAST: 80mL Isovue-370    COMPARISON: May 30, 2024    FINDINGS:   ANGIOGRAM CHEST: Pulmonary arteries are normal caliber and negative  for pulmonary emboli. Thoracic aorta is negative for dissection. No CT  evidence of right heart strain.    LUNGS AND PLEURA: 5 mm nodule in the lateral right costophrenic sulcus  is not significantly changed. 3 mm subpleural nodule right lower lobe  seen previously is less well seen due to respiratory motion. No  consolidation or effusion.    MEDIASTINUM/AXILLAE: No lymphadenopathy. No thoracic aortic aneurysms.    CORONARY ARTERY CALCIFICATIONS: Mild.    HEPATOBILIARY: Normal contour with no significant mass. No  bile duct  dilatation. Calcified gallstones.    PANCREAS: No significant mass, duct dilatation, or inflammatory  change.    SPLEEN: Normal size.    ADRENAL GLANDS: No significant nodules.    KIDNEYS/BLADDER: No significant mass, stones, or hydronephrosis.  Bladder diverticulum.    BOWEL: No obstruction or inflammatory change.    PELVIC ORGANS: No pelvic masses.    ADDITIONAL FINDINGS: No adenopathy or free fluid. There are mild  atherosclerotic changes of the visualized aorta and its branches.  There is no evidence of aortic dissection or aneurysm.    MUSCULOSKELETAL: No frankly destructive bony lesions.      Impression    IMPRESSION:  1.  No acute process demonstrated.  2.  Cholelithiasis without cholecystitis.    BEL ROMANO MD         SYSTEM ID:  [id number]   MR Brain w/o Contrast    Narrative    EXAM: MR BRAIN W/O CONTRAST  LOCATION: [facility]  DATE: 6/4/2024    INDICATION: Transient left sided numbness  COMPARISON:  MRI brain 6/8/2017.  TECHNIQUE: Head MRI without IV contrast including diffusion weighted imaging, FLAIR and hemosiderin sensitive sequences.    FINDINGS:  INTRACRANIAL CONTENTS: No acute or subacute infarct. No mass, acute hemorrhage, or extra-axial fluid collections. Hemosiderin and lacune from a chronic left putaminal hemorrhage. Scattered nonspecific T2/FLAIR hyperintensities within the cerebral white   matter most consistent with mild chronic microvascular ischemic change. Normal ventricles and sulci. Normal position of the cerebellar tonsils.     OTHER: Accounting for technique no additional abnormalities identified.      Impression    IMPRESSION:  1.  No acute intracranial abnormality.  2.  Chronic findings are similar in appearance compared to 6/8/2017.

## 2024-06-06 VITALS
SYSTOLIC BLOOD PRESSURE: 132 MMHG | HEIGHT: 65 IN | BODY MASS INDEX: 25.66 KG/M2 | WEIGHT: 154 LBS | DIASTOLIC BLOOD PRESSURE: 68 MMHG | RESPIRATION RATE: 16 BRPM | TEMPERATURE: 98 F | OXYGEN SATURATION: 97 % | HEART RATE: 62 BPM

## 2024-06-06 LAB
ANION GAP SERPL CALCULATED.3IONS-SCNC: 9 MMOL/L (ref 7–15)
BASOPHILS # BLD AUTO: 0 10E3/UL (ref 0–0.2)
BASOPHILS NFR BLD AUTO: 0 %
BUN SERPL-MCNC: 17.3 MG/DL (ref 8–23)
CALCIUM SERPL-MCNC: 9.1 MG/DL (ref 8.8–10.2)
CHLORIDE SERPL-SCNC: 106 MMOL/L (ref 98–107)
CREAT SERPL-MCNC: 0.92 MG/DL (ref 0.67–1.17)
DEPRECATED HCO3 PLAS-SCNC: 22 MMOL/L (ref 22–29)
EGFRCR SERPLBLD CKD-EPI 2021: >90 ML/MIN/1.73M2
EOSINOPHIL # BLD AUTO: 0 10E3/UL (ref 0–0.7)
EOSINOPHIL NFR BLD AUTO: 0 %
ERYTHROCYTE [DISTWIDTH] IN BLOOD BY AUTOMATED COUNT: 13.1 % (ref 10–15)
GLUCOSE SERPL-MCNC: 111 MG/DL (ref 70–99)
HCT VFR BLD AUTO: 34.2 % (ref 40–53)
HGB BLD-MCNC: 11.1 G/DL (ref 13.3–17.7)
IMM GRANULOCYTES # BLD: 0 10E3/UL
IMM GRANULOCYTES NFR BLD: 0 %
LYMPHOCYTES # BLD AUTO: 1.4 10E3/UL (ref 0.8–5.3)
LYMPHOCYTES NFR BLD AUTO: 15 %
MCH RBC QN AUTO: 30.5 PG (ref 26.5–33)
MCHC RBC AUTO-ENTMCNC: 32.5 G/DL (ref 31.5–36.5)
MCV RBC AUTO: 94 FL (ref 78–100)
MONOCYTES # BLD AUTO: 0.6 10E3/UL (ref 0–1.3)
MONOCYTES NFR BLD AUTO: 6 %
NEUTROPHILS # BLD AUTO: 7.3 10E3/UL (ref 1.6–8.3)
NEUTROPHILS NFR BLD AUTO: 79 %
NRBC # BLD AUTO: 0 10E3/UL
NRBC BLD AUTO-RTO: 0 /100
PLATELET # BLD AUTO: 262 10E3/UL (ref 150–450)
POTASSIUM SERPL-SCNC: 5 MMOL/L (ref 3.4–5.3)
RBC # BLD AUTO: 3.64 10E6/UL (ref 4.4–5.9)
SODIUM SERPL-SCNC: 137 MMOL/L (ref 135–145)
WBC # BLD AUTO: 9.3 10E3/UL (ref 4–11)

## 2024-06-06 PROCEDURE — 36415 COLL VENOUS BLD VENIPUNCTURE: CPT | Performed by: INTERNAL MEDICINE

## 2024-06-06 PROCEDURE — 85025 COMPLETE CBC W/AUTO DIFF WBC: CPT | Performed by: INTERNAL MEDICINE

## 2024-06-06 PROCEDURE — 80048 BASIC METABOLIC PNL TOTAL CA: CPT | Performed by: INTERNAL MEDICINE

## 2024-06-06 PROCEDURE — 258N000003 HC RX IP 258 OP 636: Performed by: INTERNAL MEDICINE

## 2024-06-06 PROCEDURE — 250N000013 HC RX MED GY IP 250 OP 250 PS 637: Performed by: HOSPITALIST

## 2024-06-06 PROCEDURE — 99239 HOSP IP/OBS DSCHRG MGMT >30: CPT | Performed by: INTERNAL MEDICINE

## 2024-06-06 RX ORDER — AMLODIPINE BESYLATE 10 MG/1
10 TABLET ORAL DAILY
Qty: 30 TABLET | Refills: 0 | Status: SHIPPED | OUTPATIENT
Start: 2024-06-06

## 2024-06-06 RX ORDER — PANTOPRAZOLE SODIUM 40 MG/1
40 TABLET, DELAYED RELEASE ORAL DAILY
Qty: 30 TABLET | Refills: 0 | Status: SHIPPED | OUTPATIENT
Start: 2024-06-07

## 2024-06-06 RX ADMIN — SODIUM CHLORIDE: 9 INJECTION, SOLUTION INTRAVENOUS at 05:17

## 2024-06-06 RX ADMIN — PANTOPRAZOLE SODIUM 40 MG: 40 TABLET, DELAYED RELEASE ORAL at 09:20

## 2024-06-06 RX ADMIN — FLUTICASONE FUROATE 1 PUFF: 100 POWDER RESPIRATORY (INHALATION) at 09:23

## 2024-06-06 RX ADMIN — ALUMINUM HYDROXIDE, MAGNESIUM HYDROXIDE, AND SIMETHICONE 15 ML: 1200; 120; 1200 SUSPENSION ORAL at 07:03

## 2024-06-06 ASSESSMENT — ACTIVITIES OF DAILY LIVING (ADL)
ADLS_ACUITY_SCORE: 26
ADLS_ACUITY_SCORE: 28
ADLS_ACUITY_SCORE: 26
ADLS_ACUITY_SCORE: 28
ADLS_ACUITY_SCORE: 26
ADLS_ACUITY_SCORE: 28
ADLS_ACUITY_SCORE: 26
ADLS_ACUITY_SCORE: 28
ADLS_ACUITY_SCORE: 26
ADLS_ACUITY_SCORE: 28

## 2024-06-06 NOTE — DISCHARGE SUMMARY
"[facility]  Hospitalist Discharge Summary      Date of Admission:  6/4/2024  Date of Discharge:  6/6/2024  Discharging Provider: Tigre John MD, MD  Discharge Service: Hospitalist Service    Discharge Diagnoses   Mild hyperkalemia resolved  RELL resolved  Ruled out for CVA  History of atrophic gastritis  Epigastric pain improving resolved  Benign essential hypertension  Resolved acute hypoxic respiratory failure without clear etiology   (Reassuring echocardiogram)   Moderate malnutrition In Context of:  Acute illness or injury  EGD showed intestinal metaplasia (being followed by [facility])       Clinically Significant Risk Factors     # Overweight: Estimated body mass index is 25.63 kg/m  as calculated from the following:    Height as of this encounter: 1.651 m (5' 5\").    Weight as of this encounter: 69.9 kg (154 lb).       Follow-ups Needed After Discharge   Follow-up Appointments     Follow-up and recommended labs and tests       Follow up with primary care provider, Carola Grullon, within 7 days   to evaluate medication change, to evaluate treatment change, and for   hospital follow- up. .            Unresulted Labs Ordered in the Past 30 Days of this Admission       Date and Time Order Name Status Description    6/5/2024  1:02 PM Surgical Pathology Exam In process         These results will be followed up by [facility]      Discharge Disposition   Discharged to home  Condition at discharge: Stable    Hospital Course     This pleasant 63-year-old gentleman was originally from [place] who lives with family and has a background history of atrophic gastritis and benign essential hypertension who presented previously in the emergency room for abdominal discomfort and found with possible UTI.  He presented back in the emergency room several days after due to an apparent left-sided weakness and numbness and found with hypoxia.  Also notable with unintentional weight loss and " decreased oral intake.  He was found with several electrolyte derangements.  Hyponatremia, hyperkalemia with concomitant dehydration and RELL.  He responded well with IV fluid support, and normalization of patient's numerous electrolyte derangements.  Possible etiology of patient's hyperkalemia might be his ARB use with this Exforge and recent Bactrim use.  His antihypertensives were modified and held ARB component and continued on amlodipine and his beta-blockers.  Subsequent metabolic panel showed resolution of earlier RELL.  Echocardiogram showed reassuring findings with preserved EF and no significant pathology.  Seen by GI service during this hospitalization due to unintentional weight loss, intermittent epigastric discomfort and underwent EGD which showed no significant ulceration recommendation to continue with PPI with increased dose.  He will be discharged on Protonix 40 mg daily.  No further antibiotics.  Patient's urine cultures still showing no significant growth from prior ED visit.  He is currently normotensive, able to tolerate oral diet.  Continues to show improvement with earlier abdominal discomfort and pain.  He is endorsing no further abdominal symptoms today.  Denies any nausea or vomiting.  Passing flatus and had a decent bowel movement earlier with no bleeding tendencies.  Voiding freely.  Is ambulating with no assistance and not requiring oxygen support.  Looking forward to going home.  He was ruled out as well for CVA with reassuring brain imaging.  Highly appreciate input from stroke neurology.  No significant arrhythmias seen as well during this hospitalization.      Luis Yates is a 63 year old male admitted on 6/4/2024 who presents with months of epigastric pain, unintentional weight loss, 10-minute episode of left-sided weakness and numbness, acute episode of hypoxia in the ED which resolved.    Episodic epigastric pain  Unintentional weight loss  H/o atrophic gastritis   -patient  has been experiencing this for 4 months, is intermittent but increasing in severity, accompanied by symptoms of anorexia, nausea, unintentional weight loss of 10 to 15 pounds.  Last EGD was 2017 which demonstrated atrophic gastritis no evidence for H pylori.  Had complete resolution of pain with GI cocktail in the ED, has been taking PPI over the last week or 2 without improvement.  CT did demonstrate cholelithiasis with no evidence of cholecystitis, FTs were within normal limits.  -Highly appreciate prompt input from GI service  -Possible EGD being contemplated  -Earlier CT of the chest abdomen pelvis showed no acute pathology except for findings of cholelithiasis  -May pursue further imaging such as right upper quadrant ultrasound if with persistent symptoms    -Continue PPI therapy  -Patient does [facility] return to [facility], but has been H. pylori negative in the past    10 min episode of left sided weakness/numbness  -Ruled out for CVA    -Earlier MRI showed reassuring results  -Highly appreciate input from stroke neurology and apparently signed off the case    Hyponatremia  Hyperkalemia  RELL with creatinine at 1.2    -Ongoing RELL might be related to decreased oral intake and concurrent use of diuretics.  -Telemonitoring with his hyperkalemia  -Potential etiology might be his ARB use.  Repeat serum potassium levels  -Check for echocardiogram as well the setting of earlier hypoxia  -IV fluid support with his RELL and n.p.o. status      Acute hypoxic respiratory failure, resolved -unclear etiology.   - Patient desatted to the 70s required supplemental oxygen.  During my interview he continues to sat 95+ in both the upright sitting position as well as lying flat throughout the entire interview.  COVID and viral workup was negative.    -He had negative stress test in February 2022 with an EF of 70% at that time.  Did get methylprednisolone in the ED however this would be too short of a time to have any impact.    He  got 2 L of IV fluids which would argue against any hypervolemic cause.    Extensive workup pursued in the ED which was unrevealing.  CT without evidence of pneumonia, mucous plugging, abrasion, effusion.  -Continue oximetry  -Will make short acting and controller medications which have previous fully been prescribed available      Consultations This Hospital Stay   GASTROENTEROLOGY IP CONSULT  WOUND OSTOMY CONTINENCE NURSE  IP CONSULT    Code Status   Full Code    Time Spent on this Encounter   I, Tigre John MD, MD, personally saw the patient today and spent greater than 30 minutes discharging this patient.       Tigre John MD, MD  [address] SURG[facility]  [address] [place] [address]  Phone: [phone]  Fax: [phone]  ______________________________________________________________________    Physical Exam   Vital Signs: Temp: 98  F (36.7  C) Temp src: Oral BP: 132/68 Pulse: 62   Resp: 16 SpO2: 97 % O2 Device: None (Room air) Oxygen Delivery: 2 LPM  Weight: 154 lbs 0 oz  HEENT; Atraumatic, normocephalic, pinkish conjuctiva, pupils bilateral reactive   Skin: warm and moist, no rashes  Lungs: equal chest expansion, clear to auscultation, no wheezes, no stridor, no crackles,   Heart: normal rate, normal rhythm, no rubs or gallops.   Abdomen: normal bowel sounds, no tenderness, no peritoneal signs, no guarding  Extremities: no deformities, no edema   Neuro; follow commands, alert and oriented x3, spontaneous speech, coherent, moves all extremities spontaneously  Psych; no hallucination, euthymic mood, not agitated         Primary Care Physician   Carola Grullon    Discharge Orders      Reason for your hospital stay    This pleasant 63-year-old gentleman was originally from [place] who lives with family and has a background history of atrophic gastritis and benign essential hypertension who presented previously in the emergency room for abdominal  discomfort and found with possible UTI.  He presented back in the emergency room several days after due to an apparent left-sided weakness and numbness and found with hypoxia.  Also notable with unintentional weight loss and decreased oral intake.  He was found with several electrolyte derangements.  Hyponatremia, hyperkalemia with concomitant dehydration and RELL.  He responded well with IV fluid support, and normalization of patient's numerous electrolyte derangements.  Possible etiology of patient's hyperkalemia might be his ARB use with this Exforge and recent Bactrim use.  His antihypertensives were modified and held ARB component and continued on amlodipine and his beta-blockers.  Subsequent metabolic panel showed resolution of earlier RELL.  Echocardiogram showed reassuring findings with preserved EF and no significant pathology.  Seen by GI service during this hospitalization due to unintentional weight loss, intermittent epigastric discomfort and underwent EGD which showed no significant ulceration recommendation to continue with PPI with increased dose.  He will be discharged on Protonix 40 mg daily.  No further antibiotics.  Patient's urine cultures still showing no significant growth from prior ED visit.  He is currently normotensive, able to tolerate oral diet.  Continues to show improvement with earlier abdominal discomfort and pain.  He is endorsing no further abdominal symptoms today.  Denies any nausea or vomiting.  Passing flatus and had a decent bowel movement earlier with no bleeding tendencies.  Voiding freely.  Is ambulating with no assistance and not requiring oxygen support.  Looking forward to going home.  He was ruled out as well for CVA with reassuring brain imaging.  Highly appreciate input from stroke neurology.  No significant arrhythmias seen as well during this hospitalization.     Follow-up and recommended labs and tests     Follow up with primary care provider, Carola Grullon,  within 7 days to evaluate medication change, to evaluate treatment change, and for hospital follow- up. .     Activity    Your activity upon discharge: activity as tolerated     Full Code     Diet    Follow this diet upon discharge: Orders Placed This Encounter      Regular Diet Adult       Significant Results and Procedures   Most Recent 3 CBC's:  Recent Labs   Lab Test 06/06/24  0550 06/05/24  0603 06/04/24  1313   WBC 9.3 3.3* 6.7   HGB 11.1* 11.2* 11.2*   MCV 94 93 94    251 244     Most Recent 3 BMP's:  Recent Labs   Lab Test 06/06/24  0550 06/05/24  1140 06/05/24  0603 06/04/24  1313     --  130* 128*   POTASSIUM 5.0 5.9* 5.6* 4.5   CHLORIDE 106  --  100 96*   CO2 22  --  19* 22   BUN 17.3  --  21.1 12.5   CR 0.92  --  1.21* 1.20*   ANIONGAP 9  --  11 10   MARIXA 9.1  --  8.6* 8.5*   *  --  104* 96     Most Recent 2 LFT's:  Recent Labs   Lab Test 06/04/24  1313 05/30/24  1238   AST 23 22   ALT 9 17   ALKPHOS 53 63   BILITOTAL 0.2 0.3     Most Recent Cholesterol Panel:  Recent Labs   Lab Test 05/15/24  1623   CHOL 263*   *   HDL 58   TRIG 237*     7-Day Micro Results       Collected Updated Procedure Result Status      06/04/2024 1637 06/04/2024 1726 Asymptomatic Influenza A/B, RSV, & SARS-CoV2 PCR (COVID-19) Nasopharyngeal [[id number]]    Swab from Nasopharyngeal    Final result Component Value   Influenza A PCR Negative   Influenza B PCR Negative   RSV PCR Negative   SARS CoV2 PCR Negative   NEGATIVE: SARS-CoV-2 (COVID-19) RNA not detected, presumed negative.            05/30/2024 1424 05/31/2024 1101 Urine Culture [[id number]]   Urine, Clean Catch    Final result Component Value   Culture No Growth                     Most Recent TSH and T4:  Recent Labs   Lab Test 05/18/17  1244   TSH 2.08     Most Recent Hemoglobin A1c:No lab results found.  Most Recent 6 glucoses:  Recent Labs   Lab Test 06/06/24  0550 06/05/24  0603 06/04/24  1313 05/30/24  1238 05/15/24  1623 08/13/17  1215    * 104* 96 105* 113*  113* 91     Most Recent Urinalysis:  Recent Labs   Lab Test 05/30/24  1424 03/29/22  1101   COLOR Light Yellow Yellow   APPEARANCE Clear Clear   URINEGLC Negative Negative   URINEBILI Negative Negative   URINEKETONE Negative Negative   SG 1.012 1.025   UBLD Negative Negative   URINEPH 5.0 6.0   PROTEIN Negative Negative   UROBILINOGEN  --  0.2   NITRITE Negative Negative   LEUKEST Large* Negative   RBCU 4*  --    WBCU 33*  --    ,   Results for orders placed or performed during the hospital encounter of 06/04/24   CT Head w/o Contrast    Narrative    CT SCAN OF THE HEAD WITHOUT CONTRAST   6/4/2024 12:55 PM     HISTORY: Code Stroke to evaluate for potential thrombolysis and  thrombectomy. PLEASE READ IMMEDIATELY.    TECHNIQUE:  Axial images of the head and coronal reformations without  IV contrast material. Radiation dose for this scan was reduced using  automated exposure control, adjustment of the mA and/or kV according  to patient size, or iterative reconstruction technique.    COMPARISON: 7/16/2017.    FINDINGS: No midline shift or mass effect. No acute intracranial  hemorrhage. No hydrocephalus or extra-axial hemorrhage. Atheromatous  changes involve the intracranial vasculature. Mild to moderate global  cortical volume loss with ex vacuo dilatation of the ventricular  system. No clear findings of acute loss of gray-white differentiation.  Remote lacunar infarct or slit hemorrhage involving this the left  basal ganglia posterolaterally. No acute calvarial fracture.      Impression    IMPRESSION:   No acute findings. No evidence of acute intracranial  hemorrhage.      LITZY JOHN MD         SYSTEM ID:  [id number]   CTA Head Neck with Contrast    Narrative    CT ANGIOGRAM OF THE HEAD AND NECK WITH CONTRAST  6/4/2024 1:00 PM     HISTORY: Code Stroke. Evaluate for potential thrombolysis and  thrombectomy. Left-sided numbness.    TECHNIQUE:  CT angiography with an injection of 67 mL  Isovue-370 IV  with scans through the head and neck. Images were transferred to a  separate 3-D workstation where multiplanar reformations and 3-D images  were created. Estimates of carotid stenoses are made relative to the  distal internal carotid artery diameters except as noted. Radiation  dose for this scan was reduced using automated exposure control,  adjustment of the mA and/or kV according to patient size, or iterative  reconstruction technique.    COMPARISON: CTA of the head and neck 7/12/2017.     CT ANGIOGRAM HEAD FINDINGS:  No evidence of large vessel occlusion,  high-grade stenosis, aneurysm, or high-flow vascular malformation.  Fetal origin of the left posterior cerebral artery.    CT ANGIOGRAM NECK FINDINGS:  No evidence of large vessel occlusion,  high-grade stenosis, or dissection.     INCIDENTAL FINDINGS: Cervical spine degenerative change. Presumed  atelectasis at the lung apices. Dental disease. Presumed cerumen  within the left external auditory canal.      Impression    IMPRESSION:  No evidence of large vessel occlusion or high-grade  stenosis.    TORSTEN SIMPSON MD         SYSTEM ID:  [id number]   MR Brain w/o Contrast    Narrative    EXAM: MR BRAIN W/O CONTRAST  LOCATION: [facility]  DATE: 6/4/2024    INDICATION: Transient left sided numbness  COMPARISON:  MRI brain 6/8/2017.  TECHNIQUE: Head MRI without IV contrast including diffusion weighted imaging, FLAIR and hemosiderin sensitive sequences.    FINDINGS:  INTRACRANIAL CONTENTS: No acute or subacute infarct. No mass, acute hemorrhage, or extra-axial fluid collections. Hemosiderin and lacune from a chronic left putaminal hemorrhage. Scattered nonspecific T2/FLAIR hyperintensities within the cerebral white   matter most consistent with mild chronic microvascular ischemic change. Normal ventricles and sulci. Normal position of the cerebellar tonsils.     OTHER: Accounting for technique no additional abnormalities  identified.      Impression    IMPRESSION:  1.  No acute intracranial abnormality.  2.  Chronic findings are similar in appearance compared to 6/8/2017.   CT Chest (PE) Abdomen Pelvis w Contrast    Narrative    CT CHEST PE ABDOMEN AND PELVIS WITH CONTRAST 6/4/2024 3:38 PM    CLINICAL HISTORY: Chest Pain. + D-dimer, hypoxia, abdominal pain.    TECHNIQUE: CT scan of the chest, abdomen, and pelvis was performed  following injection of IV contrast. Multiplanar reformats were  obtained. Dose reduction techniques were used.     CONTRAST: 80mL Isovue-370    COMPARISON: May 30, 2024    FINDINGS:   ANGIOGRAM CHEST: Pulmonary arteries are normal caliber and negative  for pulmonary emboli. Thoracic aorta is negative for dissection. No CT  evidence of right heart strain.    LUNGS AND PLEURA: 5 mm nodule in the lateral right costophrenic sulcus  is not significantly changed. 3 mm subpleural nodule right lower lobe  seen previously is less well seen due to respiratory motion. No  consolidation or effusion.    MEDIASTINUM/AXILLAE: No lymphadenopathy. No thoracic aortic aneurysms.    CORONARY ARTERY CALCIFICATIONS: Mild.    HEPATOBILIARY: Normal contour with no significant mass. No bile duct  dilatation. Calcified gallstones.    PANCREAS: No significant mass, duct dilatation, or inflammatory  change.    SPLEEN: Normal size.    ADRENAL GLANDS: No significant nodules.    KIDNEYS/BLADDER: No significant mass, stones, or hydronephrosis.  Bladder diverticulum.    BOWEL: No obstruction or inflammatory change.    PELVIC ORGANS: No pelvic masses.    ADDITIONAL FINDINGS: No adenopathy or free fluid. There are mild  atherosclerotic changes of the visualized aorta and its branches.  There is no evidence of aortic dissection or aneurysm.    MUSCULOSKELETAL: No frankly destructive bony lesions.      Impression    IMPRESSION:  1.  No acute process demonstrated.  2.  Cholelithiasis without cholecystitis.    BEL ROMANO MD         SYSTEM ID:   [id number]   Echocardiogram Complete     Value    LVEF  60%    [facility]    [id number]  [id number]  [id number]  [id number]^JEFF^AL^JOSUE     [facility]  Echocardiography Laboratory  [address]  [place] [address]     Name: KATELYN BROOKS  MRN: [id number]  : 1960  Study Date: 2024 11:06 AM  Age: 63 yrs  Gender: Male  Patient Location: [facility]  Reason For Study: Respiratory Failure  Ordering Physician: PRUDENCE AGUILAR  Performed By: DEUCE Kitchen     BSA: 1.8 m2  Height: 65 in  Weight: 154 lb  BP: 107/51 mmHg  ______________________________________________________________________________  Procedure  Complete Echo Adult.  ______________________________________________________________________________  Interpretation Summary     1. The left ventricle is normal in structure, function and size. The visual  ejection fraction is estimated at 60%.  2. The right ventricle is normal in structure, function and size.  3. No valve disease.     No changes from stress echo 2022.  ______________________________________________________________________________  Left Ventricle  The left ventricle is normal in structure, function and size. There is normal  left ventricular wall thickness. The visual ejection fraction is estimated at  60%. Grade I or early diastolic dysfunction. Normal left ventricular wall  motion.     Right Ventricle  The right ventricle is normal in structure, function and size.     Atria  The left atrium is mildly dilated. Right atrial size is normal. There is no  atrial shunt seen.     Mitral Valve  There is trace mitral regurgitation.     Tricuspid Valve  There is trace tricuspid regurgitation.     Aortic Valve  The aortic valve is normal in structure and function.     Pulmonic Valve  The pulmonic valve is normal in structure and function.     Vessels  Normal ascending, transverse (arch), and descending aorta. The inferior vena  cava was normal in size with  preserved respiratory variability.     Pericardium  There is no pericardial effusion.     Rhythm  Sinus rhythm was noted.  ______________________________________________________________________________  MMode/2D Measurements & Calculations  RVDd: 2.3 cm  IVSd: 1.0 cm  LVIDd: 4.6 cm  LVIDs: 2.6 cm  LVPWd: 0.80 cm  FS: 42.6 %  LV mass(C)d: 137.8 grams  LV mass(C)dI: 77.8 grams/m2  Ao root diam: 3.2 cm  LVOT diam: 2.2 cm  LVOT area: 3.9 cm2  Ao root diam index Ht(cm/m): 1.9  Ao root diam index BSA (cm/m2): 1.8  LA Volume (BP): 72.4 ml     LA Volume Index (BP): 40.9 ml/m2  RWT: 0.35  TAPSE: 3.0 cm     Doppler Measurements & Calculations  MV E max ahmet: 100.0 cm/sec  MV A max ahmet: 85.0 cm/sec  MV E/A: 1.2  MV max P.3 mmHg  MV mean P.3 mmHg  MV V2 VTI: 29.4 cm  MVA(VTI): 3.0 cm2  MV dec time: 0.21 sec  Ao V2 max: 201.0 cm/sec  Ao max P.0 mmHg  Ao V2 mean: 131.0 cm/sec  Ao mean P.0 mmHg  Ao V2 VTI: 42.8 cm  JESSICA(I,D): 2.0 cm2  JESSICA(V,D): 2.0 cm2  LV V1 max P.2 mmHg  LV V1 max: 102.0 cm/sec  LV V1 VTI: 22.6 cm  SV(LVOT): 87.4 ml  SI(LVOT): 49.4 ml/m2  PA acc time: 0.11 sec  AV Ahmet Ratio (DI): 0.51  JESSICA Index (cm2/m2): 1.2  E/E' av.5  Lateral E/e': 11.1     Medial E/e': 11.9  RV S Ahmet: 14.1 cm/sec     ______________________________________________________________________________  Report approved by: Rodriguez Wallace 2024 12:14 PM             Discharge Medications   Current Discharge Medication List        START taking these medications    Details   amLODIPine (NORVASC) 10 MG tablet Take 1 tablet (10 mg) by mouth daily  Qty: 30 tablet, Refills: 0    Associated Diagnoses: HTN (hypertension), benign      pantoprazole (PROTONIX) 40 MG EC tablet Take 1 tablet (40 mg) by mouth daily  Qty: 30 tablet, Refills: 0    Associated Diagnoses: Epigastric pain           CONTINUE these medications which have NOT CHANGED    Details   albuterol (PROAIR HFA/PROVENTIL HFA/VENTOLIN HFA) 108 (90 Base) MCG/ACT  inhaler Inhale 2 puffs into the lungs every 6 hours as needed for shortness of breath, wheezing or cough  Qty: 18 g, Refills: 2    Comments: Pharmacy may dispense brand covered by insurance (Proair, or proventil or ventolin or generic albuterol inhaler)  Associated Diagnoses: Mild intermittent asthma with status asthmaticus      atenolol (TENORMIN) 50 MG tablet Take 50 mg by mouth at bedtime      fluticasone (ARNUITY ELLIPTA) 100 MCG/ACT inhaler Inhale 1 puff into the lungs daily  Qty: 3 each, Refills: 3    Associated Diagnoses: Mild intermittent asthma with status asthmaticus           STOP taking these medications       amLODIPine-valsartan (EXFORGE)  MG tablet Comments:   Reason for Stopping: Hyperkalemia        omeprazole (PRILOSEC) 20 MG DR capsule Comments:   Reason for Stopping: Changed to Protonix        sulfamethoxazole-trimethoprim (BACTRIM DS) 800-160 MG tablet Comments:   Reason for Stopping: No growth urine cultures and recent RELL and hyperkalemia            Allergies   Allergies   Allergen Reactions    Lisinopril Cough

## 2024-06-06 NOTE — CONSULTS
CLINICAL NUTRITION SERVICES  -  ASSESSMENT NOTE         Recommendations Ordered by Registered Dietitian (RD):   Continue regular diet, discussed w/ family small frequent meals, suggested ordering Ensure as needed   Future/Additional Recommendations:   Monitor PO intake, supplement ordering & andrew, labs, weight    Malnutrition:   % Weight Loss:  1-2% in 1 week (moderate malnutrition)  % Intake:  <75% for > 7 days (moderate malnutrition)  Subcutaneous Fat Loss:  Upper arm region mild depletion- not using as indicator d/t normal age related loss  Muscle Loss:  Temporal region mild depletion, Clavicle bone region mild depletion, Acromion bone region mild depletion, and Posterior calf region mild depletion- difficult to assess acute changes, could be age related loss   Fluid Retention:  None noted    Malnutrition Diagnosis: Moderate malnutrition  In Context of:  Acute illness or injury        REASON FOR ASSESSMENT  Luis Yates is a 63 year old male seen by Registered Dietitian for Admission Nutrition Risk Screen for positive    He presents with epigastric pain, unintentional weight loss, gastritis, acute hypoxic respiratory failure (resolved), hyponatremia   PMH: Asthma     NUTRITION HISTORY    Family was able to interpret for pt. Family reports that his appetite ebbs and flows with severity of pain. Some days are worse than others. Pt reports to burning after eating which affects how much he will eat. Family reports the past 3 weeks his appetite has decreased. He used to eat 3 meals per day, the past 3 weeks pt will average 2 meals per day.     Per GI note: Reports abdominal pain for the last 4 months w/ increased severity. Does not get better or worse with food. No dysphasia. Frequent nausea, no vomiting.     CURRENT NUTRITION ORDERS  Diet Order: Regular adult       Current Intake/Tolerance:  6/5- NPO for EGD  Advanced to regular in evening PO    No flowsheet recording of intake.   Discussed small frequent meals.  "Discussed ordering Ensure as needed.     Obtained from Chart/Interdisciplinary Team:  -followed by neuro- neg for stroke   -followed by GI     -BM 6/5     ANTHROPOMETRICS  Height: 5' 5\"  Weight: 154 lbs 0 oz  Body mass index is 25.63 kg/m .  Weight Status:  Overweight BMI 25-29.9  IBW: 62 kg (136) lb   Weight History:     Wt Readings from Last 10 Encounters:   06/05/24 69.9 kg (154 lb)   05/30/24 71.8 kg (158 lb 4.6 oz)   05/15/24 72.2 kg (159 lb 3.2 oz)   03/29/22 73 kg (161 lb)   02/22/22 74.4 kg (164 lb)   08/14/17 68.4 kg (150 lb 14.4 oz)   08/10/17 68.9 kg (152 lb)   08/09/17 65.8 kg (145 lb)   08/08/17 67.6 kg (149 lb)   07/25/17 67.6 kg (149 lb)      No edema documented     LABS  Labs reviewed  K 5.0 HWNL- improving 5.9- 6/5     MEDICATIONS  Medications reviewed  -Taken off IV fluids     ASSESSED NUTRITION NEEDS PER APPROVED PRACTICE GUIDELINES:    Dosing Weight: 70 kg (154 lb)  Estimated Energy Needs: >/= 1,700 kcals ([place] Jeor)  Justification: maintenance w/ AF >/=1.2   Estimated Protein Needs: 70-84 grams protein (1-1.2 g pro/Kg)  Justification: preservation of lean body mass  Estimated Fluid Needs: >/= 1,700  mL (1 mL/Kcal)  Justification: maintenance    MALNUTRITION:  % Weight Loss:  1-2% in 1 week (moderate malnutrition)  % Intake:  <75% for > 7 days (moderate malnutrition)  Subcutaneous Fat Loss:  Upper arm region mild depletion- not using as indicator d/t normal age related loss  Muscle Loss:  Temporal region mild depletion, Clavicle bone region mild depletion, Acromion bone region mild depletion, and Posterior calf region mild depletion- difficult to assess acute changes, could be age related loss   Fluid Retention:  None noted    Malnutrition Diagnosis: Moderate malnutrition  In Context of:  Acute illness or injury    NUTRITION DIAGNOSIS:  Inadequate oral intake related to decreased appetite secondary to abdomen pain as evidenced by unintended wt loss of 1-2% in 1 week, oral intake <75% of " estimated needs for >7 days, upper arm mild fat loss, muscle loss, meets malnutrition criteria       NUTRITION INTERVENTIONS  Recommendations / Nutrition Prescription  See above .      Implementation  Nutrition education: See above     -Medical Food Supplement and Collaboration- see above  -Referral of Nutrition care- discussed plan of care w/ team during rounds, RN in room during exam   .      Nutrition Goals  -Pt to consume >/=75% of nutritional needs   .      MONITORING AND EVALUATION:  Progress towards goals will be monitored and evaluated per protocol and Practice Guidelines      Lo Rob, Dietetic Intern

## 2024-06-06 NOTE — CARE PLAN
Discharge Note    Patient discharged to home via private vehicle  accompanied by daughter.  IV: Discontinued  Prescriptions filled and given to patient/family.   Belongings reviewed and sent with patient.   Home medications returned to patient: NA  Equipment sent with: patient.   patient verbalizes understanding of discharge instructions. AVS given to patient.  Additional education completed?  Prescription

## 2024-06-06 NOTE — PLAN OF CARE
"Pt alert and oriented. VSS. RA. Denied pain. PIV  infusing N/S at 100ml/hr. Asssist of x1 with GB and walker. Indigestion given PRN Maalox 15ml.  Problem: Adult Inpatient Plan of Care  Goal: Plan of Care Review  Description: The Plan of Care Review/Shift note should be completed every shift.  The Outcome Evaluation is a brief statement about your assessment that the patient is improving, declining, or no change.  This information will be displayed automatically on your shift  note.  Outcome: Progressing  Flowsheets (Taken 6/6/2024 0528)  Outcome Evaluation: denies SOB.  Plan of Care Reviewed With: patient  Overall Patient Progress: improving  Goal: Patient-Specific Goal (Individualized)  Description: You can add care plan individualizations to a care plan. Examples of Individualization might be:  \"Parent requests to be called daily at 9am for status\", \"I have a hard time hearing out of my right ear\", or \"Do not touch me to wake me up as it startles  me\".  Outcome: Progressing  Goal: Absence of Hospital-Acquired Illness or Injury  Outcome: Progressing  Intervention: Identify and Manage Fall Risk  Recent Flowsheet Documentation  Taken 6/6/2024 0013 by Renetta Jay RN  Safety Promotion/Fall Prevention: activity supervised  Intervention: Prevent Skin Injury  Recent Flowsheet Documentation  Taken 6/6/2024 0013 by Renetta Jay RN  Body Position: position changed independently  Intervention: Prevent and Manage VTE (Venous Thromboembolism) Risk  Recent Flowsheet Documentation  Taken 6/6/2024 0013 by Renetta Jay RN  VTE Prevention/Management: SCDs (sequential compression devices) off  Intervention: Prevent Infection  Recent Flowsheet Documentation  Taken 6/6/2024 0013 by Renetta Jay RN  Infection Prevention: hand hygiene promoted  Goal: Optimal Comfort and Wellbeing  Outcome: Progressing  Goal: Readiness for Transition of Care  Outcome: Progressing     Problem: Fall Injury Risk  Goal: Absence of Fall and Fall-Related " Injury  Outcome: Progressing  Intervention: Identify and Manage Contributors  Recent Flowsheet Documentation  Taken 6/6/2024 0013 by Renetta Jay RN  Medication Review/Management: medications reviewed  Intervention: Promote Injury-Free Environment  Recent Flowsheet Documentation  Taken 6/6/2024 0013 by Renetta Jay RN  Safety Promotion/Fall Prevention: activity supervised     Problem: Pain Acute  Goal: Optimal Pain Control and Function  Outcome: Progressing  Intervention: Prevent or Manage Pain  Recent Flowsheet Documentation  Taken 6/6/2024 0013 by Renetta Jay RN  Medication Review/Management: medications reviewed   Goal Outcome Evaluation:      Plan of Care Reviewed With: patient    Overall Patient Progress: improvingOverall Patient Progress: improving    Outcome Evaluation: denies SOB.

## 2024-06-06 NOTE — PLAN OF CARE
"Goal Outcome Evaluation:      Plan of Care Reviewed With: patient    Overall Patient Progress: improvingOverall Patient Progress: improving    Outcome Evaluation: feeling better and discharge to home    To Do:  End of Shift Summary  For vital signs and complete assessments, please see documentation flowsheets.     Pertinent assessments: pt oriented, VSS on room air. Denies SOB, nausea and pain, family translating at bedside. Up with assist of 1, gait belt, and walker to the bathroom,  IV fluids discontinue, Telemetry monitoring discontinue,  BS are audible,, passing flatus, BM 6/5. Discharge to home with family.     Major Shift Events: discharge     Treatment Plan: Protonix and amlodipine     Problem: Adult Inpatient Plan of Care  Goal: Plan of Care Review  Description: The Plan of Care Review/Shift note should be completed every shift.  The Outcome Evaluation is a brief statement about your assessment that the patient is improving, declining, or no change.  This information will be displayed automatically on your shift  note.  Outcome: Progressing  Flowsheets (Taken 6/6/2024 1400)  Outcome Evaluation: feeling better and discharge to home  Plan of Care Reviewed With: patient  Overall Patient Progress: improving  Goal: Patient-Specific Goal (Individualized)  Description: You can add care plan individualizations to a care plan. Examples of Individualization might be:  \"Parent requests to be called daily at 9am for status\", \"I have a hard time hearing out of my right ear\", or \"Do not touch me to wake me up as it startles  me\".  Outcome: Progressing  Goal: Absence of Hospital-Acquired Illness or Injury  Outcome: Progressing  Intervention: Identify and Manage Fall Risk  Recent Flowsheet Documentation  Taken 6/6/2024 0920 by Yara Mejias RN  Safety Promotion/Fall Prevention:   assistive device/personal items within reach   safety round/check completed   room organization consistent  Intervention: Prevent Skin " Injury  Recent Flowsheet Documentation  Taken 6/6/2024 0920 by Yara Mejias RN  Body Position: position changed independently  Intervention: Prevent and Manage VTE (Venous Thromboembolism) Risk  Recent Flowsheet Documentation  Taken 6/6/2024 0920 by Yara Mejias RN  VTE Prevention/Management: SCDs (sequential compression devices) off  Intervention: Prevent Infection  Recent Flowsheet Documentation  Taken 6/6/2024 0920 by Yara Mejias RN  Infection Prevention:   single patient room provided   hand hygiene promoted  Goal: Optimal Comfort and Wellbeing  Outcome: Progressing  Goal: Readiness for Transition of Care  Outcome: Progressing     Problem: Fall Injury Risk  Goal: Absence of Fall and Fall-Related Injury  Outcome: Progressing  Intervention: Identify and Manage Contributors  Recent Flowsheet Documentation  Taken 6/6/2024 0920 by Yara Mejias RN  Medication Review/Management: medications reviewed  Intervention: Promote Injury-Free Environment  Recent Flowsheet Documentation  Taken 6/6/2024 0920 by Yara Mejias RN  Safety Promotion/Fall Prevention:   assistive device/personal items within reach   safety round/check completed   room organization consistent     Problem: Pain Acute  Goal: Optimal Pain Control and Function  Outcome: Progressing  Intervention: Prevent or Manage Pain  Recent Flowsheet Documentation  Taken 6/6/2024 0920 by Yara Mejias RN  Medication Review/Management: medications reviewed

## 2024-06-06 NOTE — PLAN OF CARE
"Pertinent assessments: VSS on room air. Afebrile. A&Ox4 per DARWIN at bedside. Up with assist of 1, gait belt, and walker. IV fluids infusing per orders. Telemetry monitoring in place. GI following. BS are normoactive, passing flatus, and BM today.     Major Shift Events: none.    Treatment Plan: Regular diet, IV fluids, telemetry, and discharge pending.    Bedside Nurse: Zackary Valladares RN    Problem: Adult Inpatient Plan of Care  Goal: Plan of Care Review  Description: The Plan of Care Review/Shift note should be completed every shift.  The Outcome Evaluation is a brief statement about your assessment that the patient is improving, declining, or no change.  This information will be displayed automatically on your shift  note.  Outcome: Progressing  Flowsheets (Taken 6/5/2024 2233)  Outcome Evaluation: Tolerating a regular diet. BM today.  Plan of Care Reviewed With:   patient   family  Overall Patient Progress: improving  Goal: Patient-Specific Goal (Individualized)  Description: You can add care plan individualizations to a care plan. Examples of Individualization might be:  \"Parent requests to be called daily at 9am for status\", \"I have a hard time hearing out of my right ear\", or \"Do not touch me to wake me up as it startles  me\".  Outcome: Progressing  Goal: Absence of Hospital-Acquired Illness or Injury  Outcome: Progressing  Intervention: Identify and Manage Fall Risk  Recent Flowsheet Documentation  Taken 6/5/2024 1608 by Zackary Valladares RN  Safety Promotion/Fall Prevention:   activity supervised   clutter free environment maintained   increase visualization of patient   lighting adjusted   nonskid shoes/slippers when out of bed   patient and family education   room near nurse's station   safety round/check completed   supervised activity   treat reversible contributory factors  Intervention: Prevent Skin Injury  Recent Flowsheet Documentation  Taken 6/5/2024 1608 by Zackary Valladares RN  Body Position: " position changed independently  Intervention: Prevent Infection  Recent Flowsheet Documentation  Taken 6/5/2024 1608 by Zackary Valladares, RN  Infection Prevention:   cohorting utilized   hand hygiene promoted   rest/sleep promoted   single patient room provided  Goal: Optimal Comfort and Wellbeing  Outcome: Progressing  Goal: Readiness for Transition of Care  Outcome: Progressing     Goal Outcome Evaluation:      Plan of Care Reviewed With: patient, family    Overall Patient Progress: improving    Overall Patient Progress: improving    Outcome Evaluation: Tolerating a regular diet. BM today.

## 2024-06-07 LAB
PATH REPORT.COMMENTS IMP SPEC: NORMAL
PATH REPORT.COMMENTS IMP SPEC: NORMAL
PATH REPORT.FINAL DX SPEC: NORMAL
PATH REPORT.GROSS SPEC: NORMAL
PATH REPORT.MICROSCOPIC SPEC OTHER STN: NORMAL
PATH REPORT.MICROSCOPIC SPEC OTHER STN: NORMAL
PATH REPORT.RELEVANT HX SPEC: NORMAL
PHOTO IMAGE: NORMAL

## 2024-06-08 ENCOUNTER — PATIENT OUTREACH (OUTPATIENT)
Dept: CARE COORDINATION | Facility: CLINIC | Age: 64
End: 2024-06-08
Payer: COMMERCIAL

## 2024-06-08 NOTE — PROGRESS NOTES
Connected Care Resource Center:   [facility] Resource Center Contact  [facility]/Voicemail     Clinical Data: Post-Discharge Outreach     Outreach attempted x 2.  Left message on patient's voicemail, providing [facility]'s central phone number of [phone] ([phone]) for questions/concerns and/or to schedule an appt with an [facility] provider, if they do not have a PCP.      Plan:  [facility] will do no further outreaches at this time.       Carmen Myles MA  Connected Care Resource Center, [facility]    *Connected Care Resource Team does NOT follow patient ongoing. Referrals are identified based on internal discharge reports and the outreach is to ensure patient has an understanding of their discharge instructions.

## 2024-06-09 ENCOUNTER — APPOINTMENT (OUTPATIENT)
Dept: CT IMAGING | Facility: CLINIC | Age: 64
End: 2024-06-09
Attending: EMERGENCY MEDICINE
Payer: COMMERCIAL

## 2024-06-09 ENCOUNTER — HOSPITAL ENCOUNTER (EMERGENCY)
Facility: CLINIC | Age: 64
Discharge: HOME | End: 2024-06-09
Attending: EMERGENCY MEDICINE | Admitting: EMERGENCY MEDICINE
Payer: COMMERCIAL

## 2024-06-09 ENCOUNTER — APPOINTMENT (OUTPATIENT)
Dept: ULTRASOUND IMAGING | Facility: CLINIC | Age: 64
End: 2024-06-09
Attending: EMERGENCY MEDICINE
Payer: COMMERCIAL

## 2024-06-09 VITALS
SYSTOLIC BLOOD PRESSURE: 138 MMHG | HEART RATE: 66 BPM | RESPIRATION RATE: 20 BRPM | TEMPERATURE: 98.9 F | OXYGEN SATURATION: 98 % | DIASTOLIC BLOOD PRESSURE: 72 MMHG

## 2024-06-09 DIAGNOSIS — N50.811 PAIN IN BOTH TESTICLES: ICD-10-CM

## 2024-06-09 DIAGNOSIS — N30.00 ACUTE CYSTITIS WITHOUT HEMATURIA: ICD-10-CM

## 2024-06-09 DIAGNOSIS — R10.32 LEFT GROIN PAIN: ICD-10-CM

## 2024-06-09 DIAGNOSIS — N50.812 PAIN IN BOTH TESTICLES: ICD-10-CM

## 2024-06-09 LAB
ALBUMIN UR-MCNC: 20 MG/DL
ANION GAP SERPL CALCULATED.3IONS-SCNC: 12 MMOL/L (ref 7–15)
APPEARANCE UR: ABNORMAL
BACTERIA #/AREA URNS HPF: ABNORMAL /HPF
BASOPHILS # BLD AUTO: 0 10E3/UL (ref 0–0.2)
BASOPHILS NFR BLD AUTO: 0 %
BILIRUB UR QL STRIP: NEGATIVE
BUN SERPL-MCNC: 16.5 MG/DL (ref 8–23)
CALCIUM SERPL-MCNC: 8.9 MG/DL (ref 8.8–10.2)
CHLORIDE SERPL-SCNC: 102 MMOL/L (ref 98–107)
COLOR UR AUTO: YELLOW
CREAT SERPL-MCNC: 1.07 MG/DL (ref 0.67–1.17)
DEPRECATED HCO3 PLAS-SCNC: 22 MMOL/L (ref 22–29)
EGFRCR SERPLBLD CKD-EPI 2021: 77 ML/MIN/1.73M2
EOSINOPHIL # BLD AUTO: 0.2 10E3/UL (ref 0–0.7)
EOSINOPHIL NFR BLD AUTO: 3 %
ERYTHROCYTE [DISTWIDTH] IN BLOOD BY AUTOMATED COUNT: 12.8 % (ref 10–15)
GLUCOSE SERPL-MCNC: 134 MG/DL (ref 70–99)
GLUCOSE UR STRIP-MCNC: 30 MG/DL
HCT VFR BLD AUTO: 38.8 % (ref 40–53)
HGB BLD-MCNC: 12.7 G/DL (ref 13.3–17.7)
HGB UR QL STRIP: ABNORMAL
HYALINE CASTS: 10 /LPF
IMM GRANULOCYTES # BLD: 0 10E3/UL
IMM GRANULOCYTES NFR BLD: 0 %
KETONES UR STRIP-MCNC: NEGATIVE MG/DL
LEUKOCYTE ESTERASE UR QL STRIP: ABNORMAL
LYMPHOCYTES # BLD AUTO: 2.2 10E3/UL (ref 0.8–5.3)
LYMPHOCYTES NFR BLD AUTO: 32 %
MCH RBC QN AUTO: 30.8 PG (ref 26.5–33)
MCHC RBC AUTO-ENTMCNC: 32.7 G/DL (ref 31.5–36.5)
MCV RBC AUTO: 94 FL (ref 78–100)
MONOCYTES # BLD AUTO: 0.6 10E3/UL (ref 0–1.3)
MONOCYTES NFR BLD AUTO: 9 %
MUCOUS THREADS #/AREA URNS LPF: PRESENT /LPF
NEUTROPHILS # BLD AUTO: 3.7 10E3/UL (ref 1.6–8.3)
NEUTROPHILS NFR BLD AUTO: 56 %
NITRATE UR QL: NEGATIVE
NRBC # BLD AUTO: 0 10E3/UL
NRBC BLD AUTO-RTO: 0 /100
PH UR STRIP: 5 [PH] (ref 5–7)
PLATELET # BLD AUTO: 279 10E3/UL (ref 150–450)
POTASSIUM SERPL-SCNC: 4.1 MMOL/L (ref 3.4–5.3)
RBC # BLD AUTO: 4.13 10E6/UL (ref 4.4–5.9)
RBC URINE: 11 /HPF
SODIUM SERPL-SCNC: 136 MMOL/L (ref 135–145)
SP GR UR STRIP: 1.02 (ref 1–1.03)
UROBILINOGEN UR STRIP-MCNC: NORMAL MG/DL
WBC # BLD AUTO: 6.8 10E3/UL (ref 4–11)
WBC CLUMPS #/AREA URNS HPF: PRESENT /HPF
WBC URINE: >182 /HPF

## 2024-06-09 PROCEDURE — 96375 TX/PRO/DX INJ NEW DRUG ADDON: CPT

## 2024-06-09 PROCEDURE — 36415 COLL VENOUS BLD VENIPUNCTURE: CPT | Performed by: EMERGENCY MEDICINE

## 2024-06-09 PROCEDURE — 96365 THER/PROPH/DIAG IV INF INIT: CPT | Mod: 59

## 2024-06-09 PROCEDURE — 250N000011 HC RX IP 250 OP 636: Mod: JZ | Performed by: EMERGENCY MEDICINE

## 2024-06-09 PROCEDURE — 93976 VASCULAR STUDY: CPT

## 2024-06-09 PROCEDURE — 99285 EMERGENCY DEPT VISIT HI MDM: CPT | Mod: 25

## 2024-06-09 PROCEDURE — 85025 COMPLETE CBC W/AUTO DIFF WBC: CPT | Performed by: EMERGENCY MEDICINE

## 2024-06-09 PROCEDURE — 74177 CT ABD & PELVIS W/CONTRAST: CPT

## 2024-06-09 PROCEDURE — 81001 URINALYSIS AUTO W/SCOPE: CPT | Performed by: EMERGENCY MEDICINE

## 2024-06-09 PROCEDURE — 87086 URINE CULTURE/COLONY COUNT: CPT | Performed by: EMERGENCY MEDICINE

## 2024-06-09 PROCEDURE — 76870 US EXAM SCROTUM: CPT

## 2024-06-09 PROCEDURE — 80048 BASIC METABOLIC PNL TOTAL CA: CPT | Performed by: EMERGENCY MEDICINE

## 2024-06-09 PROCEDURE — 250N000011 HC RX IP 250 OP 636: Performed by: EMERGENCY MEDICINE

## 2024-06-09 RX ORDER — IOPAMIDOL 755 MG/ML
500 INJECTION, SOLUTION INTRAVASCULAR ONCE
Status: COMPLETED | OUTPATIENT
Start: 2024-06-09 | End: 2024-06-09

## 2024-06-09 RX ORDER — CEFPODOXIME PROXETIL 200 MG/1
200 TABLET, FILM COATED ORAL 2 TIMES DAILY
Qty: 20 TABLET | Refills: 0 | Status: SHIPPED | OUTPATIENT
Start: 2024-06-09 | End: 2024-06-19

## 2024-06-09 RX ORDER — CEFTRIAXONE 1 G/1
1 INJECTION, POWDER, FOR SOLUTION INTRAMUSCULAR; INTRAVENOUS ONCE
Status: COMPLETED | OUTPATIENT
Start: 2024-06-09 | End: 2024-06-09

## 2024-06-09 RX ORDER — KETOROLAC TROMETHAMINE 15 MG/ML
15 INJECTION, SOLUTION INTRAMUSCULAR; INTRAVENOUS ONCE
Status: COMPLETED | OUTPATIENT
Start: 2024-06-09 | End: 2024-06-09

## 2024-06-09 RX ADMIN — KETOROLAC TROMETHAMINE 15 MG: 15 INJECTION, SOLUTION INTRAMUSCULAR; INTRAVENOUS at 03:34

## 2024-06-09 RX ADMIN — CEFTRIAXONE 1 G: 1 INJECTION, POWDER, FOR SOLUTION INTRAMUSCULAR; INTRAVENOUS at 05:42

## 2024-06-09 RX ADMIN — IOPAMIDOL 78 ML: 755 INJECTION, SOLUTION INTRAVENOUS at 04:23

## 2024-06-09 ASSESSMENT — ACTIVITIES OF DAILY LIVING (ADL)
ADLS_ACUITY_SCORE: 38
ADLS_ACUITY_SCORE: 36
ADLS_ACUITY_SCORE: 38
ADLS_ACUITY_SCORE: 38

## 2024-06-09 ASSESSMENT — COLUMBIA-SUICIDE SEVERITY RATING SCALE - C-SSRS
1. IN THE PAST MONTH, HAVE YOU WISHED YOU WERE DEAD OR WISHED YOU COULD GO TO SLEEP AND NOT WAKE UP?: NO
2. HAVE YOU ACTUALLY HAD ANY THOUGHTS OF KILLING YOURSELF IN THE PAST MONTH?: NO
6. HAVE YOU EVER DONE ANYTHING, STARTED TO DO ANYTHING, OR PREPARED TO DO ANYTHING TO END YOUR LIFE?: NO

## 2024-06-09 NOTE — ED TRIAGE NOTES
Pt to ER with c/o pain in penis and pain with urination, pt had some sort of surg in that area but doesn't recall what is was for or where

## 2024-06-09 NOTE — ED PROVIDER NOTES
History     Chief Complaint:  Urinary Frequency       HPI   Luis Yates is a 63 year old male with past medical history of hypertension, gout, high cholesterol, asthma, left inguinal hernia status postrepair, BPH and cholelithiasis who presents to the emergency department today with pain in his left groin, testicular pain, and dysuria.  In review of the patient's chart he was seen in our emergency department on 5/30/2024 for similar symptoms and was diagnosed with urinary tract infection.  Culture did not grow out any bacteria.  He subsequently had admission on 6/4/2024 for left-sided weakness, epigastric pain and hypertension.  He underwent a very thorough workup that ruled out CVA and significant abdominal pathology.  However recently he has been dealing with some pain in the left side of his groin, discomfort in both of his testicles and painful urination.  Patient denies any fever, chills, nausea or vomiting.  Denies any heavy lifting or inciting incident.  Denies any history of kidney stone.    Review of External Notes:  Reviewed ED visit from 5/30.  Reviewed hospital admission from 6/4    Medications:    cefpodoxime (VANTIN) 200 MG tablet  albuterol (PROAIR HFA/PROVENTIL HFA/VENTOLIN HFA) 108 (90 Base) MCG/ACT inhaler  amLODIPine (NORVASC) 10 MG tablet  atenolol (TENORMIN) 50 MG tablet  fluticasone (ARNUITY ELLIPTA) 100 MCG/ACT inhaler  pantoprazole (PROTONIX) 40 MG EC tablet        Past Medical History:    Past Medical History:   Diagnosis Date    Asthma, mild intermittent     Asthma, mild intermittent     GERD (gastroesophageal reflux disease)     Gout     HTN (hypertension)     HTN (hypertension), benign        Past Surgical History:    Past Surgical History:   Procedure Laterality Date    ESOPHAGOSCOPY, GASTROSCOPY, DUODENOSCOPY (EGD), COMBINED N/A 7/22/2017    Procedure: COMBINED ESOPHAGOSCOPY, GASTROSCOPY, DUODENOSCOPY (EGD), BIOPSY SINGLE OR MULTIPLE;  COMBINED ESOPHAGOSCOPY, GASTROSCOPY,  DUODENOSCOPY with biopsies ;  Surgeon: Darrick Moseley MD;  Location:  GI    ESOPHAGOSCOPY, GASTROSCOPY, DUODENOSCOPY (EGD), COMBINED N/A 6/5/2024    Procedure: ESOPHAGOGASTRODUODENOSCOPY, WITH BIOPSY using cold forcep;  Surgeon: Magdaleno Pinto MD;  Location:  GI    HERNIA REPAIR  2010    left     HERNIA REPAIR  2010    LAPAROSCOPIC HERNIORRHAPHY INGUINAL BILATERAL Bilateral 6/1/2017    Procedure: LAPAROSCOPIC HERNIORRHAPHY INGUINAL BILATERAL;  LAPAROSCOPIC HERNIORRHAPHY INGUINAL BILATERAL with mesh;  Surgeon: Pola Barbosa MD;  Location:  OR          Physical Exam   Patient Vitals for the past 24 hrs:   BP Temp Temp src Pulse Resp SpO2   06/09/24 0347 138/72 -- -- 66 -- 98 %   06/09/24 0335 (!) 142/80 -- -- 73 -- 99 %   06/09/24 0330 -- -- -- -- -- 99 %   06/09/24 0325 139/71 -- -- 71 -- 98 %   06/09/24 0250 (!) 153/88 98.9  F (37.2  C) Oral 72 20 97 %        Physical Exam  General: Patient is awake, alert and interactive when I enter the room.  Neck: Normal range of motion.  CV: Regular rate.   Resp: Lungs are clear without wheezes or rales. No respiratory distress.   GI: Abdomen is soft, no rigidity, guarding, or rebound. No distension. No tenderness to palpation in any quadrant.    : Penis: no lesions, no pus from urethral meatus  Perineum: no groin lymphadenopathy  Scrotum: no lesions. Mild bilateral scrotal ttp. No erythema.   Lower abdomen: No hernias noted.    MS: moving all extremities.   Skin: No rash or lesions noted. Normal capillary refill noted  Neuro: Speech is normal and fluent. Face is symmetric.   Psych:  Normal affect.  Appropriate interactions.    Emergency Department Course       Imaging:  US Testicular & Scrotum w Doppler Ltd   Final Result   IMPRESSION:   1.  Small, bilateral complex septated hydroceles.   2.  Flow documented bilaterally.      CT Abdomen Pelvis w Contrast   Final Result   Addendum (preliminary) 1 of 1   Addendum: Bladder is underdistended reducing  evaluation for wall    thickening. Correlate clinically to exclude cystitis.      Final   IMPRESSION:    1.  No inflammatory change, bowel obstruction or abscess.   2.  Cholelithiasis and mild gallbladder distention.          Laboratory:  Labs Ordered and Resulted from Time of ED Arrival to Time of ED Departure   ROUTINE UA WITH MICROSCOPIC REFLEX TO CULTURE - Abnormal       Result Value    Color Urine Yellow      Appearance Urine Slightly Cloudy (*)     Glucose Urine 30 (*)     Bilirubin Urine Negative      Ketones Urine Negative      Specific Gravity Urine 1.021      Blood Urine Small (*)     pH Urine 5.0      Protein Albumin Urine 20 (*)     Urobilinogen Urine Normal      Nitrite Urine Negative      Leukocyte Esterase Urine Large (*)     Bacteria Urine Few (*)     WBC Clumps Urine Present (*)     Mucus Urine Present (*)     RBC Urine 11 (*)     WBC Urine >182 (*)     Hyaline Casts Urine 10 (*)    BASIC METABOLIC PANEL - Abnormal    Sodium 136      Potassium 4.1      Chloride 102      Carbon Dioxide (CO2) 22      Anion Gap 12      Urea Nitrogen 16.5      Creatinine 1.07      GFR Estimate 77      Calcium 8.9      Glucose 134 (*)    CBC WITH PLATELETS AND DIFFERENTIAL - Abnormal    WBC Count 6.8      RBC Count 4.13 (*)     Hemoglobin 12.7 (*)     Hematocrit 38.8 (*)     MCV 94      MCH 30.8      MCHC 32.7      RDW 12.8      Platelet Count 279      % Neutrophils 56      % Lymphocytes 32      % Monocytes 9      % Eosinophils 3      % Basophils 0      % Immature Granulocytes 0      NRBCs per 100 WBC 0      Absolute Neutrophils 3.7      Absolute Lymphocytes 2.2      Absolute Monocytes 0.6      Absolute Eosinophils 0.2      Absolute Basophils 0.0      Absolute Immature Granulocytes 0.0      Absolute NRBCs 0.0     URINE CULTURE        Emergency Department Course & Assessments:    Interventions:  Medications   cefTRIAXone (ROCEPHIN) 1 g vial to attach to  mL bag for ADULTS or NS 50 mL bag for PEDS (has no  administration in time range)   ketorolac (TORADOL) injection 15 mg (15 mg Intravenous $Given 6/9/24 0334)   sodium chloride (PF) 0.9% PF flush 100 mL (59 mLs Intravenous $Given 6/9/24 0423)   iopamidol (ISOVUE-370) solution 500 mL (78 mLs Intravenous $Given 6/9/24 0423)         Disposition:  The patient was discharged.    Impression & Plan      Medical Decision Making:  Patient is a 63-year-old gentleman with past medical history of BPH who presents emergency department with left-sided groin pain, testicular pain and dysuria.  Upon initial evaluation he is hemodynamically stable with normal vital signs.  He is afebrile and oxygenating well on room air.  On physical exam he has no palpable left-sided hernia.  Testicles do not appear significantly swollen.  No signs of torsion.  Ultrasound of the testicles bilaterally shows no significant acute pathology.  No evidence of orchitis, epididymitis or torsion.  CT of the abdomen and pelvis shows no significant complication with previous left-sided hernia repair.  No signs of kidney stone or significant abdominal pathology.  UA was concerning for possible infectious pathology.  Patient has no significant pain with defecation or sitting down.  CT scan shows no signs of prostatitis.  Low concern for prostatitis at this juncture.  Will cover the patient for cystitis and have him follow-up with urology.    Diagnosis:    ICD-10-CM    1. Acute cystitis without hematuria  N30.00       2. Left groin pain  R10.32       3. Pain in both testicles  N50.811     N50.812            Discharge Medications:  New Prescriptions    CEFPODOXIME (VANTIN) 200 MG TABLET    Take 1 tablet (200 mg) by mouth 2 times daily for 10 days        MD Óscar Carmen Christopher Joseph, MD  06/09/24 2687

## 2024-06-10 LAB — BACTERIA UR CULT: NORMAL

## 2024-07-01 ENCOUNTER — PATIENT OUTREACH (OUTPATIENT)
Dept: FAMILY MEDICINE | Facility: CLINIC | Age: 64
End: 2024-07-01
Payer: COMMERCIAL

## 2024-07-01 NOTE — TELEPHONE ENCOUNTER
Patient Quality Outreach    Patient is due for the following:   Colon Cancer Screening      Topic Date Due    Pneumococcal Vaccine (1 of 2 - PCV) Never done    Diptheria Tetanus Pertussis (DTAP/TDAP/TD) Vaccine (1 - Tdap) Never done    Zoster (Shingles) Vaccine (1 of 2) Never done    COVID-19 Vaccine (3 - 2023-24 season) 09/01/2023       Next Steps:   Schedule a nurse only visit for updated immunizations office visit for colonoscopy order    Type of outreach:    Sent [employer] message.    Questions for provider review:    None           Ronda Potts, CMA

## 2024-07-01 NOTE — LETTER
[facility]  [address] [address] [phone]  July 1, 2024    Luis Yates  [address] [facility] [address]    Dear Luis,    I care about your health and have reviewed your health plan. I have reviewed your medical conditions, medication list, and lab results and am making recommendations based on this review, to better manage your health.    You are in particular need of attention regarding:  -Colon Cancer Screening    I am recommending that you:  {recommendations:-schedule a COLONOSCOPY to look for colon cancer (due every 10 years or 5 years in higher risk situations.)   Colon cancer is now the second leading cause of death in the [place] for both men and women and there are over 130,000 new cases and 50,000 deaths per year from colon cancer.  Colonoscopies can prevent 90-95% of these deaths.  Problem lesions can be removed before they ever become cancer.  This test is not only looking for cancer, but also getting rid of precancerious lesions.  If you do not wish to do a colonoscopy or cannot afford to do one, at this time, there is another option. It is called a FIT test or Fecal Immunochemical Occult Blood Test (take home stool sample kit).  It does not replace the colonoscopy for colorectal cancer screening, but it can detect hidden bleeding in the lower colon.  It does need to be repeated every year and if a positive result is obtained, you would be referred for a colonoscopy.  If you have completed either one of these tests at another facility, please have the records sent to our clinic so that we can best coordinate your care.    Here is a list of Health Maintenance topics that are due now or due soon:  Health Maintenance Due   Topic Date Due    ASTHMA ACTION PLAN  Never done    Pneumococcal Vaccine: 65+ Years (1 of 2 - PCV) Never done    COLORECTAL CANCER SCREENING  Never done    DTAP/TDAP/TD IMMUNIZATION  (1 - Tdap) Never done    ZOSTER IMMUNIZATION (1 of 2) Never done    RSV VACCINE (Pregnancy & 60+) (1 - 1-dose 60+ series) Never done    COVID-19 Vaccine (3 - 2023-24 season) 09/01/2023       Please call us at [phone] (or use [employer]) to address the above recommendations.     Thank you for trusting [facility] and we appreciate the opportunity to serve you.  We look forward to supporting your healthcare needs in the future.    Healthy Regards,    Griffin Marquis MD

## 2025-02-17 ENCOUNTER — MYC MEDICAL ADVICE (OUTPATIENT)
Dept: FAMILY MEDICINE | Facility: CLINIC | Age: 65
End: 2025-02-17
Payer: COMMERCIAL

## 2025-02-17 NOTE — TELEPHONE ENCOUNTER
Patient Quality Outreach    Patient is due for the following:   Asthma  -  ACT needed  Colon Cancer Screening      Topic Date Due    Pneumococcal Vaccine (1 of 2 - PCV) Never done    Diptheria Tetanus Pertussis (DTAP/TDAP/TD) Vaccine (1 - Tdap) Never done    Zoster (Shingles) Vaccine (1 of 2) Never done    Flu Vaccine (1) 09/01/2024    COVID-19 Vaccine (3 - 2024-25 season) 09/01/2024       Action(s) Taken:   Schedule a office visit for Asthma Follow Up    Type of outreach:    Sent [employer] message.    Questions for provider review:    None           Ronda Potts, CMA

## 2025-06-28 ENCOUNTER — HEALTH MAINTENANCE LETTER (OUTPATIENT)
Age: 65
End: 2025-06-28

## (undated) DEVICE — Device

## (undated) DEVICE — ENDO FORCEP ENDOJAW BIOPSY 2.8MMX160CM FB-220K

## (undated) DEVICE — CLEANSER WOUND IRRISEPT 0.05% CHG IRRISEPT-403

## (undated) DEVICE — GLOVE PROTEXIS BLUE W/NEU-THERA 8.0  2D73EB80

## (undated) DEVICE — ESU GROUND PAD ADULT W/CORD E7507

## (undated) DEVICE — BLADE KNIFE SURG 11 371111

## (undated) DEVICE — ESU PENCIL W/HOLSTER E2350H

## (undated) DEVICE — LINEN POUCH DBL 5427

## (undated) DEVICE — CATH FOLEY COUDE 18FR 5ML LATEX

## (undated) DEVICE — LINEN HALF SHEET 5512

## (undated) DEVICE — CATH TRAY FOLEY COUDE SURESTEP 16FR W/DRN BAG LATEX A304416A

## (undated) DEVICE — SU VICRYL 0 UR-6 27" J603H

## (undated) DEVICE — BLADE CLIPPER 3M 9670

## (undated) DEVICE — SYSTEM CLEARIFY VISUALIZATION 21-345

## (undated) DEVICE — ESU ELEC BLADE 2.75" COATED/INSULATED E1455

## (undated) DEVICE — PREP CHLORAPREP 26ML TINTED ORANGE  260815

## (undated) DEVICE — LINEN FULL SHEET 5511

## (undated) DEVICE — KIT ENDO TURNOVER/PROCEDURE W/CLEAN A SCOPE LINERS 103888

## (undated) DEVICE — GLOVE PROTEXIS POWDER FREE 7.5 ORTHOPEDIC 2D73ET75

## (undated) DEVICE — DECANTER VIAL 2006S

## (undated) DEVICE — LINEN TOWEL PACK X5 5464

## (undated) DEVICE — ESU CORD MONOPOLAR 10'  E0510

## (undated) DEVICE — BAG CLEAR TRASH 1.3M 39X33" P4040C

## (undated) DEVICE — GOWN IMPERVIOUS ZONED XLG 9041

## (undated) DEVICE — ENDO DISSECTOR KITTNER 05MM 28-0804

## (undated) DEVICE — ENDO TRAP POLYP QUICK CATCH 710201

## (undated) DEVICE — ENDO SNARE HEXAGONAL ISNARE 2.5X4.0CM W/25GA NDL

## (undated) DEVICE — DISSECTOR BALLOON SPACEMAKER PRO BTT & OVAL SMBTTOVLX

## (undated) DEVICE — SU VICRYL 4-0 PS-2 18" UND J496H

## (undated) RX ORDER — PROPOFOL 10 MG/ML
INJECTION, EMULSION INTRAVENOUS
Status: DISPENSED
Start: 2017-06-01

## (undated) RX ORDER — ONDANSETRON 2 MG/ML
INJECTION INTRAMUSCULAR; INTRAVENOUS
Status: DISPENSED
Start: 2017-06-01

## (undated) RX ORDER — IPRATROPIUM BROMIDE AND ALBUTEROL SULFATE 2.5; .5 MG/3ML; MG/3ML
SOLUTION RESPIRATORY (INHALATION)
Status: DISPENSED
Start: 2017-06-01

## (undated) RX ORDER — BUPIVACAINE HYDROCHLORIDE 5 MG/ML
INJECTION, SOLUTION EPIDURAL; INTRACAUDAL
Status: DISPENSED
Start: 2017-06-01

## (undated) RX ORDER — FENTANYL CITRATE 50 UG/ML
INJECTION, SOLUTION INTRAMUSCULAR; INTRAVENOUS
Status: DISPENSED
Start: 2017-06-01

## (undated) RX ORDER — PHENYLEPHRINE HCL IN 0.9% NACL 1 MG/10 ML
SYRINGE (ML) INTRAVENOUS
Status: DISPENSED
Start: 2017-06-01

## (undated) RX ORDER — LIDOCAINE HYDROCHLORIDE 10 MG/ML
INJECTION, SOLUTION EPIDURAL; INFILTRATION; INTRACAUDAL; PERINEURAL
Status: DISPENSED
Start: 2017-06-01

## (undated) RX ORDER — NEOSTIGMINE METHYLSULFATE 5 MG/5 ML
SYRINGE (ML) INTRAVENOUS
Status: DISPENSED
Start: 2017-06-01

## (undated) RX ORDER — DEXAMETHASONE SODIUM PHOSPHATE 4 MG/ML
INJECTION, SOLUTION INTRA-ARTICULAR; INTRALESIONAL; INTRAMUSCULAR; INTRAVENOUS; SOFT TISSUE
Status: DISPENSED
Start: 2017-06-01

## (undated) RX ORDER — GLYCOPYRROLATE 0.2 MG/ML
INJECTION INTRAMUSCULAR; INTRAVENOUS
Status: DISPENSED
Start: 2017-06-01

## (undated) RX ORDER — CEFAZOLIN SODIUM 2 G/100ML
INJECTION, SOLUTION INTRAVENOUS
Status: DISPENSED
Start: 2017-06-01

## (undated) RX ORDER — FENTANYL CITRATE 50 UG/ML
INJECTION, SOLUTION INTRAMUSCULAR; INTRAVENOUS
Status: DISPENSED
Start: 2024-06-05

## (undated) RX ORDER — FENTANYL CITRATE 50 UG/ML
INJECTION, SOLUTION INTRAMUSCULAR; INTRAVENOUS
Status: DISPENSED
Start: 2017-07-22